# Patient Record
Sex: FEMALE | Race: WHITE | Employment: FULL TIME | URBAN - METROPOLITAN AREA
[De-identification: names, ages, dates, MRNs, and addresses within clinical notes are randomized per-mention and may not be internally consistent; named-entity substitution may affect disease eponyms.]

---

## 2019-01-30 ENCOUNTER — OFFICE VISIT (OUTPATIENT)
Dept: ORTHOPEDIC SURGERY | Age: 54
End: 2019-01-30

## 2019-01-30 VITALS
WEIGHT: 216 LBS | HEART RATE: 70 BPM | SYSTOLIC BLOOD PRESSURE: 146 MMHG | DIASTOLIC BLOOD PRESSURE: 86 MMHG | HEIGHT: 66 IN | OXYGEN SATURATION: 95 % | BODY MASS INDEX: 34.72 KG/M2

## 2019-01-30 DIAGNOSIS — M17.0 PRIMARY OSTEOARTHRITIS OF BOTH KNEES: Primary | ICD-10-CM

## 2019-01-30 RX ORDER — LOSARTAN POTASSIUM 25 MG/1
TABLET ORAL DAILY
COMMUNITY
End: 2019-08-29 | Stop reason: SDUPTHER

## 2019-01-30 RX ORDER — ZOLPIDEM TARTRATE 10 MG/1
TABLET ORAL
COMMUNITY
End: 2019-08-29 | Stop reason: SDUPTHER

## 2019-01-30 RX ORDER — FUROSEMIDE 20 MG/1
TABLET ORAL DAILY
COMMUNITY
End: 2019-08-29 | Stop reason: SDUPTHER

## 2019-01-30 RX ORDER — GABAPENTIN 250 MG/5ML
250 SOLUTION ORAL 3 TIMES DAILY
COMMUNITY
End: 2019-08-29 | Stop reason: CLARIF

## 2019-01-30 RX ORDER — METFORMIN HYDROCHLORIDE 1000 MG/1
1000 TABLET ORAL 2 TIMES DAILY WITH MEALS
COMMUNITY
End: 2019-08-29 | Stop reason: CLARIF

## 2019-01-30 RX ORDER — SIMVASTATIN 20 MG/1
TABLET, FILM COATED ORAL
COMMUNITY
End: 2019-08-29 | Stop reason: CLARIF

## 2019-01-30 RX ORDER — TRIAMCINOLONE ACETONIDE 40 MG/ML
40 INJECTION, SUSPENSION INTRA-ARTICULAR; INTRAMUSCULAR ONCE
Qty: 1 ML | Refills: 0
Start: 2019-01-30 | End: 2019-01-30

## 2019-01-30 NOTE — PROGRESS NOTES
Patient: Yuriy Fish                MRN: [id number]       SSN: [id number]  YOB: 1965        AGE: 48 y.o. SEX: female  Body mass index is 34.86 kg/m². PCP: No primary care provider on file. 19    Chief Complaint: Bilateral knee pain    HISTORY OF PRESENT ILLNESS:   Yuriy Fish is a 48year-old female who comes in the office today with bilateral knee pain. Her pain is worse when working and standing. She has had multiple surgeries to both knees, which are arthroscopies. She has known arthritis in her knees. She is here today to discuss treatment options. Past Medical History:   Diagnosis Date    Cancer [facility])     breast cancer 6 years ago    [facility]     Hypertension        History reviewed. No pertinent family history. Current Outpatient Medications   Medication Sig Dispense Refill    gabapentin (NEURONTIN) 250 mg/5 mL solution Take 250 mg by mouth three (3) times daily.  metFORMIN (GLUCOPHAGE) 1,000 mg tablet Take 1,000 mg by mouth two (2) times daily (with meals).  simvastatin (ZOCOR) 20 mg tablet Take  by mouth nightly.  losartan (COZAAR) 25 mg tablet Take  by mouth daily.  zolpidem (AMBIEN) 10 mg tablet Take  by mouth nightly as needed for Sleep.  furosemide (LASIX) 20 mg tablet Take  by mouth daily.  triamcinolone acetonide (KENALOG) 40 mg/mL injection 1 mL by Intra artICUlar route once for 1 dose.  1 mL 0       Allergies   Allergen Reactions    Aspirin Swelling    Penicillins Swelling       Past Surgical History:   Procedure Laterality Date    HX  SECTION      HX CHOLECYSTECTOMY      HX KNEE ARTHROSCOPY      HX MASTECTOMY      HX TUBAL LIGATION      HX WRIST FRACTURE TX         Social History     Socioeconomic History    Marital status:      Spouse name: Not on file    Number of children: Not on file    Years of education: Not on file    Highest education level: Not on file   Social Needs    Financial resource strain: Not on file    Food insecurity - worry: Not on file    Food insecurity - inability: Not on file    Transportation needs - medical: Not on file   [employer] needs - non-medical: Not on file   Occupational History    Not on file   Tobacco Use    Smoking status: Current Every Day Smoker    Smokeless tobacco: Never Used   Substance and Sexual Activity    Alcohol use: No     Frequency: Never    Drug use: No    Sexual activity: Not on file   Other Topics Concern    Not on file   Social History Narrative    Not on file       REVIEW OF SYSTEMS:      CON: negative for recent weight loss/gain, fever, or chills  EYE: negative for double or blurry vision  ENT: negative for hoarseness  RS:   negative for cough, URI, SOB  CV:  negative for chest pain, palpitations  GI:    negative for blood in stool, nausea/vomiting  :  negative for blood in urine  MS: As per HPI  Other systems reviewed and noted below. PHYSICAL EXAMINATION:  Visit Vitals  /86   Pulse 70   Ht 5' 6\" (1.676 m)   Wt 216 lb (98 kg)   SpO2 95%   BMI 34.86 kg/m²     Body mass index is 34.86 kg/m². GENERAL: Alert and oriented x3, in no acute distress, well-developed, well-nourished. HEENT: Normocephalic, atraumatic. RESP: Non labored breathing with equal chest rise on inspiration. CV: Well perfused extremities. No cyanosis or clubbing noted. ABDOMEN: Soft, non-tender, non-distended. PHYSICAL EXAMINATION:   Physical exam of both knees with crepitus with knee range of motion. No effusion, warmth or erythema. No swelling. Neurovascularly intact distally. IMAGING:   X-rays were taken in the office today. These show bilateral knee arthritis, worse on the right than the left. ASSESSMENT/PLAN:   April Bolaños is a 48year-old female with bilateral knee arthritis. We discussed multiple treatment options today. I would not recommend any surgery on this at this time.   She would like to try cortisone shots. Therefore, after obtaining consent, both knees were injected with Lidocaine and steroid. She tolerated this well. Follow up as needed.         [facility] - Daryl Daniel  OFFICE PROCEDURE PROGRESS NOTE        Chart reviewed for the following:   Nolan Toro MD, have reviewed the History, Physical and updated the Allergic reactions for Jc Smitha performed immediately prior to start of procedure:   Nolan Toro MD, have performed the following reviews on Dee Dee Price prior to the start of the procedure:            * Patient was identified by name and date of birth   * Agreement on procedure being performed was verified  * Risks and Benefits explained to the patient  * Procedure site verified and marked as necessary  * Patient was positioned for comfort  * Consent was signed and verified    Time: 1300      Date of procedure: 1/30/2019    Procedure performed by:  Yessenia Casas MD    Provider assisted by: Hiram Berger LPN    Patient assisted by: self    How tolerated by patient: tolerated the procedure well with no complications    Post Procedural Pain Scale: 0 - No Hurt    Comments: none                  Electronically signed by: Yessenia Casas MD

## 2019-07-30 ENCOUNTER — OFFICE VISIT (OUTPATIENT)
Dept: ORTHOPEDIC SURGERY | Age: 54
End: 2019-07-30

## 2019-07-30 VITALS
HEART RATE: 67 BPM | HEIGHT: 66 IN | RESPIRATION RATE: 13 BRPM | OXYGEN SATURATION: 95 % | DIASTOLIC BLOOD PRESSURE: 105 MMHG | TEMPERATURE: 95.7 F | WEIGHT: 223.2 LBS | BODY MASS INDEX: 35.87 KG/M2 | SYSTOLIC BLOOD PRESSURE: 200 MMHG

## 2019-07-30 DIAGNOSIS — M76.61 TENDONITIS, ACHILLES, RIGHT: Primary | ICD-10-CM

## 2019-07-30 DIAGNOSIS — M25.571 ACUTE RIGHT ANKLE PAIN: ICD-10-CM

## 2019-07-30 DIAGNOSIS — E66.01 SEVERE OBESITY (HCC): ICD-10-CM

## 2019-07-30 RX ORDER — MELOXICAM 15 MG/1
15 TABLET ORAL
Qty: 30 TAB | Refills: 0 | Status: SHIPPED | OUTPATIENT
Start: 2019-07-30 | End: 2020-01-21 | Stop reason: SDUPTHER

## 2019-07-30 RX ORDER — FAMOTIDINE 20 MG/1
40 TABLET, FILM COATED ORAL DAILY
Qty: 30 TAB | Refills: 0 | Status: SHIPPED | OUTPATIENT
Start: 2019-07-30 | End: 2019-08-29 | Stop reason: ALTCHOICE

## 2019-07-30 NOTE — PROGRESS NOTES
AMBULATORY PROGRESS NOTE      Patient: Edinson Lizarraga             MRN: [id number]     SSN: [id number] Body mass index is 36.03 kg/m². YOB: 1965     AGE: 47 y.o. SEX: female    PCP: No primary care provider on file. IMPRESSION/DIAGNOSIS AND TREATMENT PLAN     DIAGNOSES  1. Tendonitis, Achilles, right    2. Acute right ankle pain    3. Severe obesity ([address]        Orders Placed This Encounter    AMB SUPPLY ORDER    [63497] Ankle Min 3V    meloxicam (MOBIC) 15 mg tablet    famotidine (PEPCID) 20 mg tablet      Edinson Lizarraga understands her diagnoses and the proposed plan. Plan:    1) DME Order: Short CAM Boot with   2) DME Order : Heel wedge  3) Mobic 15 m PO every day; 30 tablets, 0 refills. 4) Pepcid 40 m PO every day; 30 tablets, 0 refills. 5) If symptoms do not improve, expect to obtain an MRI to access achilles tendon. RTO - 3 weeks     HPI AND EXAMINATION     Edinson Lizarraga IS A 47 y.o. female who presents to my outpatient office for the first time complaining of: right ankle pain. For the past 4 weeks, patient notes right ankle pain with associated swelling and bruising but denies recent injury. 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 She denies fall, twists, or trauma. She states that she has tried ibuprofen, salt soaks and rest without benefit. Per patient, her ankle pain begins prior to reaching work. She notes associated calf soreness. Patient has history of breast cancer and HTN.       Visit Vitals  BP (!) 200/105   Pulse 67   Temp 95.7 °F (35.4 °C) (Oral)   Resp 13   Ht 5' 6\" (1.676 m)   Wt 223 lb 3.2 oz (101.2 kg)   SpO2 95%   BMI 36.03 kg/m²         Visit Vitals  BP (!) 200/105   Pulse 67   Temp 95.7 °F (35.4 °C) (Oral)   Resp 13   Ht 5' 6\" (1.676 m)   Wt 223 lb 3.2 oz (101.2 kg)   SpO2 95%   BMI 36.03 kg/m²       Appearance: Alert, well appearing and pleasant patient who is in no distress, oriented to person, place/time, and who follows commands. This patient is accompanied in the examination room by her  self. no dementia  Psychiatric: Affect and mood are appropriate. Patient arrives to office via: with assistive device: CANE  SHAHRAM PETERSENNT (2): Head normocephalic & atraumatic. Both pupils are round, non icteric sclera     Eye: EOM are intact and sclera are clear    Neck: ROM WNL       Hearings Intact, does not require hearing aid device  Respiratory: Breathing is unlabored without accessory chest muscle use  Cardiovascular/Peripheral Vascular: Normal Pulses to each  foot      ANKLE/FOOT right    Psychiatry: Alert, Oriented x 3; Speech normal in context and clarity,            Memory intact grossly, no involuntary movements - tremors, no dementia  Gait: normal  Tenderness: moderate tender achilles tendon without deformity palpable:      Swelling:  Significant fusiform swelling at the noninsertional point of her achilies tendon. No warmth. Calf tenderness: Absent for calf or gastrocnemius muscle regions   Soft, supple, non tender, non taut lower extremity compartments   NONE to Medial Malleolar, 4/5 Met base midfoot, achilles, tib post, or   NONE to syndesmosis. no to plantar fascia, or central calcaneal region  Cutaneous: WNL, Joint Motion: WNL   Joint / Tendon Stability: No Ankle or Subtalar instability or joint laxity. No peroneal sublux ability or dislocation  Alignment: neutral Hindfoot, none Metatarsus Adductus Metatarsus. Neuro Motor/Sensory: NL/NL, Vascular: NL foot/ankle pulses  Lymphatics: No extremity lymphedema, No calf swelling, no tenderness to calf muscles. CHART REVIEW     Past Medical History:   Diagnosis Date    Cancer [facility])     breast cancer 6 years ago    Diabetes ([facility]     Hypertension      Current Outpatient Medications   Medication Sig    meloxicam (MOBIC) 15 mg tablet Take 1 Tab by mouth daily (with breakfast).     famotidine (PEPCID) 20 mg tablet Take 2 Tabs by mouth daily for 150 days. Take while on Plavix instead of Prilosec    gabapentin (NEURONTIN) 250 mg/5 mL solution Take 250 mg by mouth three (3) times daily.  metFORMIN (GLUCOPHAGE) 1,000 mg tablet Take 1,000 mg by mouth two (2) times daily (with meals).  simvastatin (ZOCOR) 20 mg tablet Take  by mouth nightly.  losartan (COZAAR) 25 mg tablet Take  by mouth daily.  zolpidem (AMBIEN) 10 mg tablet Take  by mouth nightly as needed for Sleep.  furosemide (LASIX) 20 mg tablet Take  by mouth daily. No current facility-administered medications for this visit. Allergies   Allergen Reactions    Aspirin Swelling    Codeine Swelling    Penicillins Swelling     Past Surgical History:   Procedure Laterality Date    HX  SECTION      HX CHOLECYSTECTOMY      HX KNEE ARTHROSCOPY      HX MASTECTOMY      HX TUBAL LIGATION      HX WRIST FRACTURE TX       Social History     Occupational History    Not on file   Tobacco Use    Smoking status: Current Every Day Smoker    Smokeless tobacco: Never Used   Substance and Sexual Activity    Alcohol use: No     Frequency: Never    Drug use: No    Sexual activity: Not on file     No family history on file. REVIEW OF SYSTEMS : 2019  ALL BELOW ARE Negative except : SEE HPI     General: Negative for fever and chills. No unexpected change in weight. Denies fatigue. No change in appetite. Dermatologic: Negative for rash or itching, dry skin, hair changes, rash or skin lesion changes  HEENT: Negative for congestion, sore throat, neck pain and neck stiffness. No change in vision or hearing. Hasn't noted any enlarged lymph nodes in the neck. Cardiovascular:  Negative for chest pain and palpitations. Has not noted pedal edema.   Peripheral Vascular: No calf pain, vascular vein tenderness to calf pain           No calf throbbing, posterior knee throbbing pain   Respiratory: Negative for cough, colds, sinus, hemoptysis, shortness of breath and wheezing. Gastrointestinal: Negative for nausea and vomiting, rectal bleeding, coffee ground emesis, abdominal pain, diarrhea and constipation. Genitourinary: Negative for dysuria, frequency urgency, or burning on micturition. No flank pain, no foul smelling urine, no difficulty with initiating urination. Hematological: Negative for bleeding or easy bruising. Musculoskeletal: Negative  for arthralgias, back pain or neck pain. Neurological: Negative for dizziness, seizures or syncopal episodes. Denies headaches. Endocrine: Denies excessive thirst.  No heat/cold intolerance. Psychiatric: Negative for depression or insomnia. DIAGNOSTIC IMAGING     ANKLE X RAYS 3 VIEWS Left   X RAYS AT [address] [place] [address]  7/30/2019    FINDINGS:     SOFT TISSUES:              Absent soft tissue swelling, No soft tissue calcifications, No Calcified blood vessels     Soft tissue swelling location:    None to fibula region        None medial aspect    None dorsal midfoot/forefoot  No radiopaque foreign body, abnormal lucency, or focal swelling noted within soft tissues. OSSEOUS:  Medial malleolar ankle mild bone ossicle is seen medial   No fractures, subluxations, dislocations   Bone spur to inferior aspect of calcaneus is  moderate   Bone spur to superior calcaneus region is           large SIZED CALCIFIC INSERTIONAL BONE SPUR IS PRESENT   Mineralization: suggests no Osteopenia or no Osteoporosis    ALIGNMENT:    Ankle mortise alignment is congruent. Tibial plafond and talar dome intact      No Osteochondral defects seen    No Ankle joint effusion seen         I have personally reviewed these images of the above study. The interpretation of this study is my professional opinion. Broadus Primrose, MD  7/30/2019  8:52 AM      Written by María Lion, as dictated by Dr. Kalpana Joya. I, Dr. Kalpana Joya, confirm that all documentation is accurate.

## 2019-07-30 NOTE — PATIENT INSTRUCTIONS
Achilles Tendinitis: Exercises     Complete at least 2 sessions of each exercise each day to get started (no days off). If beneficial and able to fit into your schedule, more sessions are recommended. Nothing should cause an increase in pain or swelling. Towel stretch (calf)    1. Sit with your legs extended and knees straight. 2. Place a towel around your foot just under the toes. 3. Hold each end of the towel in each hand, with your hands above your knees. 4. Pull back with the towel so that your foot stretches toward you. 5. Hold the position for 30 seconds. 6. Repeat 3 times a session. 7. When 3 sets are completed, slightly bend the knee by placing a towel or pillow under it and completing 3 more sets per leg. 8. If stretch becomes too easy and you are no longer able to feel a stretch, discontinue exercise in favor of standing stretches. Wall stretch (calf)    1. Stand facing a wall with your hands on the wall at about eye level. Put your affected leg about a step behind your other leg. 2. Keeping your back leg straight and your back heel on the floor, bend your front knee and gently bring your hip and chest toward the wall until you feel a stretch in the calf of your back leg. 3. Hold the stretch for 30 seconds. 4. Repeat 3 times per leg. 5. Repeat steps 1 through 4, but this time keep your back knee bent. Stair stretch (calf)    1. Stand with the balls of both feet on the edge of a step or curb. With at least one hand, hold onto something solid for balance, such as a banister or handrail. 2. Keeping your knees straight, slowly let the heels hang down off of the step or curb until you feel a stretch in the back of your calf and/or Achilles area. 3. Hold this position for 30 seconds. 4. Repeat 3 times a session. This stretch should be repeated with your knees slightly bent. If too easy, progress to one leg at a time.    5. If stretch is too painful initially,  front of the step and stretch one foot at a time, using the step to push your foot back toward your shin and follow same parameters. Heel raises (eccentric emphasis)    1. Stand on a step with your heel off the edge of the step. Hold on to a handrail or wall for balance. 2. Push up on your toes, then slowly count to 5 as you lower yourself back down until your heel is below the step. If it hurts to push up on your toes, try putting most of your weight on your other foot as you push up, or try using your arms to help you. If you can't do this exercise without causing pain, stop the exercise. 3. Repeat 10 times for 3 sets. If you have any questions, please call [facility] and ask for Cielo Morales, Certified Athletic Trainer.

## 2019-07-30 NOTE — PROGRESS NOTES
1. Have you been to the ER, urgent care clinic since your last visit? Hospitalized since your last visit? No    2. Have you seen or consulted any other health care providers outside of the [address] [place] [address] since your last visit? Include any pap smears or colon screening.  No

## 2019-07-30 NOTE — PROGRESS NOTES
I demonstrated and instructed patient on Achilles stretching exercises. Patient verbally understands and consents to treatment plan.

## 2019-08-20 ENCOUNTER — OFFICE VISIT (OUTPATIENT)
Dept: ORTHOPEDIC SURGERY | Age: 54
End: 2019-08-20

## 2019-08-20 VITALS
DIASTOLIC BLOOD PRESSURE: 99 MMHG | HEART RATE: 70 BPM | HEIGHT: 66 IN | OXYGEN SATURATION: 95 % | WEIGHT: 223 LBS | SYSTOLIC BLOOD PRESSURE: 186 MMHG | TEMPERATURE: 96 F | BODY MASS INDEX: 35.84 KG/M2

## 2019-08-20 DIAGNOSIS — M76.62 ACHILLES TENDINITIS OF LEFT LOWER EXTREMITY: Primary | ICD-10-CM

## 2019-08-20 RX ORDER — METHYLPREDNISOLONE 4 MG/1
4 TABLET ORAL
Qty: 21 TAB | Refills: 0 | Status: SHIPPED | OUTPATIENT
Start: 2019-08-20 | End: 2019-08-29 | Stop reason: ALTCHOICE

## 2019-08-20 RX ORDER — FAMOTIDINE 40 MG/1
40 TABLET, FILM COATED ORAL DAILY
Qty: 30 TAB | Refills: 0 | Status: SHIPPED | OUTPATIENT
Start: 2019-08-20 | End: 2019-08-29 | Stop reason: ALTCHOICE

## 2019-08-20 NOTE — PROGRESS NOTES
AMBULATORY PROGRESS NOTE      Patient: Kishor Sparrow             MRN: [id number]     SSN: [id number] Body mass index is 35.99 kg/m². YOB: 1965     AGE: 47 y.o. SEX: female    PCP: No primary care provider on file. IMPRESSION/DIAGNOSIS AND TREATMENT PLAN     DIAGNOSES  1. Achilles tendinitis of left lower extremity        Orders Placed This Encounter    methylPREDNISolone (MEDROL DOSEPACK) 4 mg tablet    famotidine (PEPCID) 40 mg tablet      Kishor Sparrow understands her diagnoses and the proposed plan. Plan:    1) Medrol DosePak. 2) Pepcid 40 m PO every day; 30 tablets, 0 refills. 3) Continue performing Achilles tendon stretches as directed. 4) Continue activity modification as directed. 5) Anticipate MRI if condition does not improve. RTO - 3 weeks     HPI AND EXAMINATION     Kishor Sparrow IS A 47 y.o. female who presents to my outpatient office for follow up of Achilles tendinitis of the LLE. At the last visit, I provided an order for a short left CAM boot and Visco heels, prescribed Mobic 15 mg and Pepcid 40 mg, instructed the patient to continue activity modification as directed, and to anticipate an MRI if condition does not improve. Since we saw her last, Ms. Moshe Cage states that she is still experiencing considerable pain in her left Achilles tendon. She reports that she starts experiencing pain just a few hours into her work day, even when wearing the CAM walker boot. The patient is currently taking gabapentin. She recalls that her pain has been ongoing for 4 weeks and that she has been using the CAM boot for 3 weeks. The patient has h/o breast cancer and HTN. Visit Vitals  BP (!) 186/99   Pulse 70   Temp 96 °F (35.6 °C) (Oral)   Ht 5' 6\" (1.676 m)   Wt 223 lb (101.2 kg)   SpO2 95%   BMI 35.99 kg/m²     Appearance: Alert, well appearing and pleasant patient who is in no distress, oriented to person, place/time, and who follows commands. This patient is accompanied in the examination room by her  self. no dementia  Psychiatric: Affect and mood are appropriate. Patient arrives to office via: with assistive device: CAM walker boot  H EENT (2): Head normocephalic & atraumatic. Both pupils are round, non icteric sclera     Eye: EOM are intact and sclera are clear    Neck: ROM WNL       Hearings Intact, does not require hearing aid device  Respiratory: Breathing is unlabored without accessory chest muscle use  Cardiovascular/Peripheral Vascular: Normal Pulses to each  foot      ANKLE/FOOT left    Gait: in CAM walker boot  Tenderness: moderate tender achilles tendon without deformity palpable:  Swelling: Moderate fusiform swelling at the noninsertional point of her achilies tendon. No warmth. Calf tenderness: Absent for calf or gastrocnemius muscle regions   Soft, supple, non tender, non taut lower extremity compartments   NONE to Medial Malleolar, 4/5 Met base midfoot, achilles, tib post, or   NONE to syndesmosis. no to plantar fascia, or central calcaneal region  Cutaneous: WNL  Joint Motion: WNL   Joint / Tendon Stability: No Ankle or Subtalar instability or joint laxity. No peroneal sublux ability or dislocation  Alignment: neutral Hindfoot, none Metatarsus Adductus Metatarsus. Neuro Motor/Sensory: NL/NL  Vascular: NL foot/ankle pulses  Lymphatics: No extremity lymphedema, No calf swelling, no tenderness to calf muscles. CHART REVIEW     Past Medical History:   Diagnosis Date    Cancer [facility])     breast cancer 6 years ago    Diabetes ([facility]     Hypertension      Current Outpatient Medications   Medication Sig    methylPREDNISolone (MEDROL DOSEPACK) 4 mg tablet Take 1 Tab by mouth Specific Days and Specific Times.  famotidine (PEPCID) 40 mg tablet Take 1 Tab by mouth daily.  meloxicam (MOBIC) 15 mg tablet Take 1 Tab by mouth daily (with breakfast).     famotidine (PEPCID) 20 mg tablet Take 2 Tabs by mouth daily for 150 days. Take while on Plavix instead of Prilosec    gabapentin (NEURONTIN) 250 mg/5 mL solution Take 250 mg by mouth three (3) times daily.  metFORMIN (GLUCOPHAGE) 1,000 mg tablet Take 1,000 mg by mouth two (2) times daily (with meals).  simvastatin (ZOCOR) 20 mg tablet Take  by mouth nightly.  losartan (COZAAR) 25 mg tablet Take  by mouth daily.  zolpidem (AMBIEN) 10 mg tablet Take  by mouth nightly as needed for Sleep.  furosemide (LASIX) 20 mg tablet Take  by mouth daily. No current facility-administered medications for this visit. Allergies   Allergen Reactions    Aspirin Swelling    Codeine Swelling    Penicillins Swelling     Past Surgical History:   Procedure Laterality Date    HX  SECTION      HX CHOLECYSTECTOMY      HX KNEE ARTHROSCOPY      HX MASTECTOMY      HX TUBAL LIGATION      HX WRIST FRACTURE TX       Social History     Occupational History    Not on file   Tobacco Use    Smoking status: Current Every Day Smoker    Smokeless tobacco: Never Used   Substance and Sexual Activity    Alcohol use: No     Frequency: Never    Drug use: No    Sexual activity: Not on file     History reviewed. No pertinent family history. REVIEW OF SYSTEMS : 2019  ALL BELOW ARE Negative except : SEE HPI     General: Negative for fever and chills. No unexpected change in weight. Denies fatigue. No change in appetite. Dermatologic: Negative for rash or itching, dry skin, hair changes, rash or skin lesion changes  HEENT: Negative for congestion, sore throat, neck pain and neck stiffness. No change in vision or hearing. Hasn't noted any enlarged lymph nodes in the neck. Cardiovascular:  Negative for chest pain and palpitations. Has not noted pedal edema.   Peripheral Vascular: No calf pain, vascular vein tenderness to calf pain           No calf throbbing, posterior knee throbbing pain   Respiratory: Negative for cough, colds, sinus, hemoptysis, shortness of breath and wheezing. Gastrointestinal: Negative for nausea and vomiting, rectal bleeding, coffee ground emesis, abdominal pain, diarrhea and constipation. Genitourinary: Negative for dysuria, frequency urgency, or burning on micturition. No flank pain, no foul smelling urine, no difficulty with initiating urination. Hematological: Negative for bleeding or easy bruising. Musculoskeletal: Negative  for arthralgias, back pain or neck pain. Neurological: Negative for dizziness, seizures or syncopal episodes. Denies headaches. Endocrine: Denies excessive thirst.  No heat/cold intolerance. Psychiatric: Negative for depression or insomnia. DIAGNOSTIC IMAGING     No notes on file    Please see above section of this report. I have personally reviewed the results of the above study. The interpretation of this study is my professional opinion. Written by Jonathan Marquez, as dictated by Dr. Bharat Juan. I, Dr. Bharat Juan, confirm that all documentation is accurate.

## 2019-08-20 NOTE — PATIENT INSTRUCTIONS
Tendon Injury (Tendinopathy): Care Instructions  Your Care Instructions    Tendons are tough, flexible tissues that connect muscle to bone. A tendon can hurt or get torn from overuse or aging, especially tendons in the shoulder, elbow, wrist, hip, knee, or ankle. Tendon injuries may be called tendinopathy or tendinitis. Tendon injuries can occur from any motion you have to repeat in a job, sports, or daily activities. Tennis elbow is one common tendon injury. You can treat most tendon problems with over-the-counter pain medicine, rest, changes in your activities, and physical therapy. Follow-up care is a key part of your treatment and safety. Be sure to make and go to all appointments, and call your doctor if you are having problems. It's also a good idea to know your test results and keep a list of the medicines you take. How can you care for yourself at home? · Rest the sore area. You may have to stop doing the activity that caused the tendon pain for a while. · Take an over-the-counter pain medicine, such as acetaminophen (Tylenol), ibuprofen (Advil, Motrin), or naproxen (Aleve). Read and follow all instructions on the label. · Do not take two or more pain medicines at the same time unless the doctor told you to. Many pain medicines have acetaminophen, which is Tylenol. Too much acetaminophen (Tylenol) can be harmful. · Put ice or a cold pack on the sore area for 10 to 20 minutes at a time. Try to do this every 1 to 2 hours for the next 3 days (when you are awake) or until any swelling goes down. Put a thin cloth between the ice and your skin. · Prop up the sore area on a pillow when you ice it or anytime you sit or lie down during the next 3 days. Try to keep it above the level of your heart. This will help reduce swelling.   · Follow your doctor's advice for wearing and caring for a sling, splint, or cast. In some cases, you may wear one of these for a while to help your tendon heal.  · Follow your doctor's advice for stretching and physical therapy. Gently move your joint through its full range of motion. This will prevent stiffness in your joint. · Go back to your activity slowly. Warm up before and stretch after the activity. You also can try making some changes. For example, if a sport caused your tendon pain, alternate the sport with another activity. If using a tool causes pain, switch hands or change your . Stop the activity if it hurts. After the activity, apply ice to prevent pain and swelling. · Do not smoke. Smoking can slow healing. If you need help quitting, talk to your doctor about stop-smoking programs and medicines. These can increase your chances of quitting for good. When should you call for help? Watch closely for changes in your health, and be sure to contact your doctor if:    · Your pain gets worse.     · You do not get better as expected. Where can you learn more? Go to http://aminta-mely.info/. Enter A157 in the search box to learn more about \"Tendon Injury (Tendinopathy): Care Instructions. \"  Current as of: September 20, 2018  Content Version: 12.1  © 9977-8463 [employer]. Care instructions adapted under license by [employer] (which disclaims liability or warranty for this information). If you have questions about a medical condition or this instruction, always ask your healthcare professional. [address] any warranty or liability for your use of this information.

## 2019-08-29 ENCOUNTER — OFFICE VISIT (OUTPATIENT)
Dept: FAMILY MEDICINE CLINIC | Age: 54
End: 2019-08-29

## 2019-08-29 VITALS
WEIGHT: 213 LBS | TEMPERATURE: 96.4 F | RESPIRATION RATE: 20 BRPM | BODY MASS INDEX: 34.23 KG/M2 | HEIGHT: 66 IN | DIASTOLIC BLOOD PRESSURE: 83 MMHG | SYSTOLIC BLOOD PRESSURE: 134 MMHG | OXYGEN SATURATION: 95 % | HEART RATE: 74 BPM

## 2019-08-29 DIAGNOSIS — Z13.29 SCREENING FOR ENDOCRINE, METABOLIC AND IMMUNITY DISORDER: ICD-10-CM

## 2019-08-29 DIAGNOSIS — E66.01 SEVERE OBESITY (HCC): ICD-10-CM

## 2019-08-29 DIAGNOSIS — R23.2 HOT FLASHES: ICD-10-CM

## 2019-08-29 DIAGNOSIS — Z12.11 SCREENING FOR COLORECTAL CANCER: ICD-10-CM

## 2019-08-29 DIAGNOSIS — E11.9 TYPE 2 DIABETES MELLITUS WITHOUT COMPLICATION, WITHOUT LONG-TERM CURRENT USE OF INSULIN (HCC): ICD-10-CM

## 2019-08-29 DIAGNOSIS — Z13.228 SCREENING FOR ENDOCRINE, METABOLIC AND IMMUNITY DISORDER: ICD-10-CM

## 2019-08-29 DIAGNOSIS — Z13.0 SCREENING FOR ENDOCRINE, METABOLIC AND IMMUNITY DISORDER: ICD-10-CM

## 2019-08-29 DIAGNOSIS — E78.00 HYPERCHOLESTEROLEMIA: ICD-10-CM

## 2019-08-29 DIAGNOSIS — Z12.12 SCREENING FOR COLORECTAL CANCER: ICD-10-CM

## 2019-08-29 DIAGNOSIS — Z85.3 HISTORY OF RIGHT BREAST CANCER: ICD-10-CM

## 2019-08-29 DIAGNOSIS — I10 ESSENTIAL HYPERTENSION: Primary | ICD-10-CM

## 2019-08-29 DIAGNOSIS — Z11.59 ENCOUNTER FOR HEPATITIS C SCREENING TEST FOR LOW RISK PATIENT: ICD-10-CM

## 2019-08-29 DIAGNOSIS — G47.00 INSOMNIA, UNSPECIFIED TYPE: ICD-10-CM

## 2019-08-29 RX ORDER — METFORMIN HYDROCHLORIDE 500 MG/1
500 TABLET ORAL 2 TIMES DAILY
Refills: 5 | COMMUNITY
Start: 2019-08-07 | End: 2019-08-29 | Stop reason: SDUPTHER

## 2019-08-29 RX ORDER — SIMVASTATIN 40 MG/1
40 TABLET, FILM COATED ORAL
COMMUNITY
Start: 2019-08-19 | End: 2019-08-29 | Stop reason: SDUPTHER

## 2019-08-29 RX ORDER — SIMVASTATIN 40 MG/1
40 TABLET, FILM COATED ORAL
Qty: 90 TAB | Refills: 1 | Status: SHIPPED | OUTPATIENT
Start: 2019-08-29 | End: 2020-01-21 | Stop reason: SDUPTHER

## 2019-08-29 RX ORDER — FUROSEMIDE 20 MG/1
20 TABLET ORAL
Qty: 90 TAB | Refills: 1 | Status: SHIPPED | OUTPATIENT
Start: 2019-08-29 | End: 2020-01-21 | Stop reason: SDUPTHER

## 2019-08-29 RX ORDER — METFORMIN HYDROCHLORIDE 500 MG/1
500 TABLET ORAL 2 TIMES DAILY
Qty: 180 TAB | Refills: 1 | Status: SHIPPED | OUTPATIENT
Start: 2019-08-29 | End: 2020-01-21 | Stop reason: SDUPTHER

## 2019-08-29 RX ORDER — GABAPENTIN 100 MG/1
100 CAPSULE ORAL 3 TIMES DAILY
Qty: 270 CAP | Refills: 1 | Status: SHIPPED | OUTPATIENT
Start: 2019-08-29 | End: 2020-01-21 | Stop reason: SDUPTHER

## 2019-08-29 RX ORDER — ZOLPIDEM TARTRATE 10 MG/1
10 TABLET ORAL
Qty: 90 TAB | Refills: 1 | Status: SHIPPED | OUTPATIENT
Start: 2019-08-29 | End: 2019-12-23 | Stop reason: SDUPTHER

## 2019-08-29 RX ORDER — GABAPENTIN 100 MG/1
CAPSULE ORAL 3 TIMES DAILY
COMMUNITY
Start: 2019-08-02 | End: 2019-08-29 | Stop reason: SDUPTHER

## 2019-08-29 RX ORDER — VENLAFAXINE 37.5 MG/1
37.5 TABLET ORAL DAILY
Refills: 5 | COMMUNITY
Start: 2019-08-07 | End: 2019-08-29 | Stop reason: SDUPTHER

## 2019-08-29 RX ORDER — VENLAFAXINE 37.5 MG/1
37.5 TABLET ORAL DAILY
Qty: 90 TAB | Refills: 1 | Status: SHIPPED | OUTPATIENT
Start: 2019-08-29 | End: 2020-01-21 | Stop reason: SDUPTHER

## 2019-08-29 RX ORDER — LOSARTAN POTASSIUM 25 MG/1
25 TABLET ORAL DAILY
Qty: 90 TAB | Refills: 1 | Status: SHIPPED | OUTPATIENT
Start: 2019-08-29 | End: 2020-01-21 | Stop reason: DRUGHIGH

## 2019-08-29 NOTE — PROGRESS NOTES
OFFICE NOTE    Aamir Medrano is a 47 y.o. female presenting today for office visit. 8/29/2019  4:03 PM    Chief Complaint   Patient presents with    Establish Care       HPI: Here today as a new patient, establishing care. Used to see a provider in [address] for PCP. Unsure of last routine labs. Follows with foot/ankle orthopedics. Used to see oncology for history of right breast cancer with [facility]. HTN/Hyperlipidemia: Taking Losartan, Lasix PRN, and Zocor for management. Unsure of last LDL. Does not check BP at home. No cardiac imaging on file. Diabetes: Diagnosed a few years ago and has been taking Metformin- reports that she always has been told that she is \"borderline. \" Does not check glucoses at home. She is not sure of last A1c. Insomnia: Taking Ambien 10 mg nightly- has been on for years. Reports that 5 mg never worked for her so she has been taking 10 mg. No daytime sleepiness. History of breast cancer: She reports diagnosed in 2013 and had right mastectomy with some lymph node removals. She has some right arm swelling that she uses PRN Lasix. She is on Mobic and Gabapentin for management of chronic pain related to surgery. She had been on Femara in the past. Has not seen oncology in a few years. She also has hot flashes- on Effexor. Review of Systems   Constitutional: Negative for appetite change, chills, fatigue, fever and unexpected weight change. Respiratory: Negative for cough, shortness of breath and wheezing. Cardiovascular: Negative for chest pain, palpitations and leg swelling. Gastrointestinal: Negative for abdominal pain, constipation, diarrhea, nausea and vomiting. Genitourinary: Negative for difficulty urinating and frequency. Musculoskeletal: Negative for arthralgias and myalgias. Skin: Negative for rash. Neurological: Negative for dizziness and headaches.    Psychiatric/Behavioral: Negative for decreased concentration, dysphoric mood, hallucinations, self-injury, sleep disturbance and suicidal ideas. The patient is not nervous/anxious.           PHQ Screening   3 most recent PHQ Screens 2019   Little interest or pleasure in doing things Not at all   Feeling down, depressed, irritable, or hopeless Not at all   Total Score PHQ 2 0         History  Past Medical History:   Diagnosis Date    Breast cancer ([facility] [address] 2013    right breast    Diabetes ([facility]     Hypercholesterolemia     Hypertension     Insomnia        Past Surgical History:   Procedure Laterality Date    HX CARPAL TUNNEL RELEASE Bilateral     HX  SECTION      HX CHOLECYSTECTOMY      HX KNEE ARTHROSCOPY Bilateral     HX MASTECTOMY Right 2013    HX TUBAL LIGATION         Social History     Socioeconomic History    Marital status:      Spouse name: Not on file    Number of children: Not on file    Years of education: Not on file    Highest education level: Not on file   Occupational History    Not on file   Social Needs    Financial resource strain: Not on file    Food insecurity:     Worry: Not on file     Inability: Not on file    Transportation needs:     Medical: Not on file     Non-medical: Not on file   Tobacco Use    Smoking status: Current Every Day Smoker    Smokeless tobacco: Never Used   Substance and Sexual Activity    Alcohol use: Yes     Frequency: Never     Comment: occassionl    Drug use: No    Sexual activity: Not Currently     Partners: Male   Lifestyle    Physical activity:     Days per week: Not on file     Minutes per session: Not on file    Stress: Not on file   Relationships    Social connections:     Talks on phone: Not on file     Gets together: Not on file     Attends Gnosticism service: Not on file     Active member of club or organization: Not on file     Attends meetings of clubs or organizations: Not on file     Relationship status: Not on file    Intimate partner violence:     Fear of current or ex partner: Not on file     Emotionally abused: Not on file     Physically abused: Not on file     Forced sexual activity: Not on file   Other Topics Concern    Not on file   Social History Narrative    Not on file       Family History   Problem Relation Age of Onset    Cancer Mother     COPD Mother     Alcohol abuse Father     Diabetes Father     Hypertension Father     Heart Disease Father     Elevated Lipids Father     Hypertension Brother     Hypertension Maternal Grandmother     Hypertension Maternal Grandfather        Allergies   Allergen Reactions    Aspirin Swelling    Codeine Swelling    Penicillins Swelling       Current Outpatient Medications   Medication Sig Dispense Refill    meloxicam (MOBIC) 15 mg tablet Take 1 Tab by mouth daily (with breakfast). 30 Tab 0    losartan (COZAAR) 25 mg tablet Take  by mouth daily.  zolpidem (AMBIEN) 10 mg tablet Take  by mouth nightly as needed for Sleep.  furosemide (LASIX) 20 mg tablet Take  by mouth daily.  venlafaxine (EFFEXOR) 37.5 mg tablet Take 37.5 mg by mouth daily. 5    gabapentin (NEURONTIN) 100 mg capsule three (3) times daily.  metFORMIN (GLUCOPHAGE) 500 mg tablet 500 mg two (2) times a day. 5    simvastatin (ZOCOR) 40 mg tablet 40 mg. Advance Care Planning:   Patient was offered the opportunity to discuss advance care planning NO   Does patient have an Advance Directive: If no, did you provide information on Caring Connections? Patient Care Team:  Patient Care Team:  Danyelle Corona NP as PCP - General (Nurse Practitioner)        LABS:  None new to review    RADIOLOGY:  None new to review      Physical Exam   Constitutional: She is oriented to person, place, and time. She appears well-developed and well-nourished. No distress. Neck: Normal range of motion. Neck supple. No thyromegaly present. Cardiovascular: Normal rate, regular rhythm and normal heart sounds. No murmur heard.   Pulmonary/Chest: Effort normal and breath sounds normal. No respiratory distress. Abdominal: Soft. Bowel sounds are normal. There is no tenderness. Musculoskeletal: She exhibits no edema. +left foot in orthopedic boot   Neurological: She is alert and oriented to person, place, and time. She exhibits normal muscle tone. Coordination and gait normal.   Skin: Skin is warm and dry. Psychiatric: Her speech is normal and behavior is normal. Judgment normal. Her mood appears not anxious. She is not hyperactive, not withdrawn and not actively hallucinating. Cognition and memory are normal. She does not express impulsivity. She does not exhibit a depressed mood. She expresses no homicidal and no suicidal ideation. Vitals:    08/29/19 1537   BP: 134/83   Pulse: 74   Resp: 20   Temp: 96.4 °F (35.8 °C)   TempSrc: Oral   SpO2: 95%   Weight: 213 lb (96.6 kg)   Height: 5' 6\" (1.676 m)   PainSc:   0 - No pain         Assessment and Plan    Essential hypertension  *Refilled, controlled. Check labs routinely in the next few weeks  - losartan (COZAAR) 25 mg tablet; Take 1 Tab by mouth daily. Dispense: 90 Tab; Refill: 1  - METABOLIC PANEL, COMPREHENSIVE; Future    Hypercholesterolemia  *Refilled, check lipids soon  - simvastatin (ZOCOR) 40 mg tablet; Take 1 Tab by mouth nightly. Dispense: 90 Tab; Refill: 1  - LIPID PANEL; Future    Severe obesity ([address]  *I have reviewed/discussed the above normal BMI with the patient. I have recommended the following interventions: dietary management education, guidance, and counseling, encourage exercise and monitor weight . Type 2 diabetes mellitus without complication, without long-term current use of insulin (HCC)  *Check routine labs soon. Refilled on Metformin  - metFORMIN (GLUCOPHAGE) 500 mg tablet; Take 1 Tab by mouth two (2) times a day. Dispense: 180 Tab; Refill: 1  - HEMOGLOBIN A1C WITH EAG;  Future  - MICROALBUMIN, UR, RAND W/ MICROALB/CREAT RATIO; Future    Insomnia, unspecified type  *Advised that not recommended to be on 10 mg; however, she has been doing well. Will continue at this time  - zolpidem (AMBIEN) 10 mg tablet; Take 1 Tab by mouth nightly as needed for Sleep. Max Daily Amount: 10 mg. Dispense: 90 Tab; Refill: 1    History of right breast cancer  *Refilled. Forward to oncology for further evaluation and management  - furosemide (LASIX) 20 mg tablet; Take 1 Tab by mouth daily as needed for Other (severe swelling). Dispense: 90 Tab; Refill: 1  - gabapentin (NEURONTIN) 100 mg capsule; Take 1 Cap by mouth three (3) times daily. Max Daily Amount: 300 mg. Dispense: 270 Cap; Refill: 1  - REFERRAL TO ONCOLOGY    Hot flashes  *Refilled, stable  - venlafaxine (EFFEXOR) 37.5 mg tablet; Take 1 Tab by mouth daily. Dispense: 90 Tab; Refill: 1    Screening for endocrine, metabolic and immunity disorder  - CBC W/O DIFF; Future  - URINALYSIS W/MICROSCOPIC; Future  - TSH 3RD GENERATION; Future    Encounter for hepatitis C screening test for low risk patient  - HEPATITIS C AB; Future    Screening for colorectal cancer  - REFERRAL TO COLON AND RECTAL SURGERY      *Plan of care reviewed with patient. Patient in agreement with plan and expresses understanding. All questions answered and patient encouraged to call or RTO if further questions or concerns. Follow-up and Dispositions    · Return in about 5 months (around 1/29/2020) for chronic disease routine care- 30 min.

## 2019-09-03 ENCOUNTER — LAB ONLY (OUTPATIENT)
Dept: FAMILY MEDICINE CLINIC | Age: 54
End: 2019-09-03

## 2019-09-03 ENCOUNTER — HOSPITAL ENCOUNTER (OUTPATIENT)
Dept: LAB | Age: 54
Discharge: HOME | End: 2019-09-03
Payer: COMMERCIAL

## 2019-09-03 DIAGNOSIS — I10 ESSENTIAL HYPERTENSION: ICD-10-CM

## 2019-09-03 DIAGNOSIS — Z11.59 ENCOUNTER FOR HEPATITIS C SCREENING TEST FOR LOW RISK PATIENT: ICD-10-CM

## 2019-09-03 DIAGNOSIS — Z13.0 SCREENING FOR ENDOCRINE, METABOLIC AND IMMUNITY DISORDER: ICD-10-CM

## 2019-09-03 DIAGNOSIS — Z01.89 ENCOUNTER FOR LABORATORY TEST: Primary | ICD-10-CM

## 2019-09-03 DIAGNOSIS — E78.00 HYPERCHOLESTEROLEMIA: ICD-10-CM

## 2019-09-03 DIAGNOSIS — Z13.29 SCREENING FOR ENDOCRINE, METABOLIC AND IMMUNITY DISORDER: ICD-10-CM

## 2019-09-03 DIAGNOSIS — Z13.228 SCREENING FOR ENDOCRINE, METABOLIC AND IMMUNITY DISORDER: ICD-10-CM

## 2019-09-03 DIAGNOSIS — E11.9 TYPE 2 DIABETES MELLITUS WITHOUT COMPLICATION, WITHOUT LONG-TERM CURRENT USE OF INSULIN (HCC): ICD-10-CM

## 2019-09-03 LAB
ALBUMIN SERPL-MCNC: 3.8 G/DL (ref 3.4–5)
ALBUMIN/GLOB SERPL: 1.5 {RATIO} (ref 0.8–1.7)
ALP SERPL-CCNC: 76 U/L (ref 45–117)
ALT SERPL-CCNC: 52 U/L (ref 13–56)
ANION GAP SERPL CALC-SCNC: 4 MMOL/L (ref 3–18)
APPEARANCE UR: ABNORMAL
AST SERPL-CCNC: 25 U/L (ref 10–38)
BACTERIA URNS QL MICRO: ABNORMAL /HPF
BILIRUB SERPL-MCNC: 0.6 MG/DL (ref 0.2–1)
BILIRUB UR QL: NEGATIVE
BUN SERPL-MCNC: 11 MG/DL (ref 7–18)
BUN/CREAT SERPL: 15 (ref 12–20)
CALCIUM SERPL-MCNC: 9.4 MG/DL (ref 8.5–10.1)
CHLORIDE SERPL-SCNC: 103 MMOL/L (ref 100–111)
CO2 SERPL-SCNC: 31 MMOL/L (ref 21–32)
COLOR UR: YELLOW
CREAT SERPL-MCNC: 0.75 MG/DL (ref 0.6–1.3)
EPITH CASTS URNS QL MICRO: ABNORMAL /LPF (ref 0–5)
ERYTHROCYTE [DISTWIDTH] IN BLOOD BY AUTOMATED COUNT: 14.4 % (ref 11.6–14.5)
EST. AVERAGE GLUCOSE BLD GHB EST-MCNC: 163 MG/DL
GLOBULIN SER CALC-MCNC: 2.5 G/DL (ref 2–4)
GLUCOSE SERPL-MCNC: 159 MG/DL (ref 74–99)
GLUCOSE UR STRIP.AUTO-MCNC: NEGATIVE MG/DL
HBA1C MFR BLD: 7.3 % (ref 4.2–5.6)
HCT VFR BLD AUTO: 47.6 % (ref 35–45)
HGB BLD-MCNC: 15.5 G/DL (ref 12–16)
HGB UR QL STRIP: NEGATIVE
KETONES UR QL STRIP.AUTO: NEGATIVE MG/DL
LEUKOCYTE ESTERASE UR QL STRIP.AUTO: NEGATIVE
MCH RBC QN AUTO: 31.3 PG (ref 24–34)
MCHC RBC AUTO-ENTMCNC: 32.6 G/DL (ref 31–37)
MCV RBC AUTO: 96 FL (ref 74–97)
NITRITE UR QL STRIP.AUTO: NEGATIVE
PH UR STRIP: 5 [PH] (ref 5–8)
PLATELET # BLD AUTO: 204 K/UL (ref 135–420)
PMV BLD AUTO: 10.4 FL (ref 9.2–11.8)
POTASSIUM SERPL-SCNC: 4.2 MMOL/L (ref 3.5–5.5)
PROT SERPL-MCNC: 6.3 G/DL (ref 6.4–8.2)
PROT UR STRIP-MCNC: 30 MG/DL
RBC # BLD AUTO: 4.96 M/UL (ref 4.2–5.3)
RBC #/AREA URNS HPF: 0 /HPF (ref 0–5)
SODIUM SERPL-SCNC: 138 MMOL/L (ref 136–145)
SP GR UR REFRACTOMETRY: 1.02 (ref 1–1.03)
TSH SERPL DL<=0.05 MIU/L-ACNC: 1.57 UIU/ML (ref 0.36–3.74)
UROBILINOGEN UR QL STRIP.AUTO: 0.2 EU/DL (ref 0.2–1)
WBC # BLD AUTO: 8.4 K/UL (ref 4.6–13.2)
WBC URNS QL MICRO: ABNORMAL /HPF (ref 0–4)
YEAST URNS QL MICRO: ABNORMAL

## 2019-09-03 PROCEDURE — 81001 URINALYSIS AUTO W/SCOPE: CPT

## 2019-09-03 PROCEDURE — 86803 HEPATITIS C AB TEST: CPT

## 2019-09-03 PROCEDURE — 82043 UR ALBUMIN QUANTITATIVE: CPT

## 2019-09-03 PROCEDURE — 80053 COMPREHEN METABOLIC PANEL: CPT

## 2019-09-03 PROCEDURE — 80061 LIPID PANEL: CPT

## 2019-09-03 PROCEDURE — 85027 COMPLETE CBC AUTOMATED: CPT

## 2019-09-03 PROCEDURE — 84443 ASSAY THYROID STIM HORMONE: CPT

## 2019-09-03 PROCEDURE — 83036 HEMOGLOBIN GLYCOSYLATED A1C: CPT

## 2019-09-03 NOTE — PROGRESS NOTES
Venipuncture to left forearm at this time. Patient tolerated well at this time. Labs ordered by PCP. No concerns noted.

## 2019-09-04 LAB
CHOLEST SERPL-MCNC: 176 MG/DL
CREAT UR-MCNC: 175 MG/DL (ref 30–125)
HCV AB SER IA-ACNC: 0.03 INDEX
HCV AB SERPL QL IA: NEGATIVE
HCV COMMENT,HCGAC: NORMAL
HDLC SERPL-MCNC: 41 MG/DL (ref 40–60)
HDLC SERPL: 4.3 {RATIO} (ref 0–5)
LDLC SERPL CALC-MCNC: 84.8 MG/DL (ref 0–100)
LIPID PROFILE,FLP: ABNORMAL
MICROALBUMIN UR-MCNC: 21.5 MG/DL (ref 0–3)
MICROALBUMIN/CREAT UR-RTO: 123 MG/G (ref 0–30)
TRIGL SERPL-MCNC: 251 MG/DL (ref ?–150)
VLDLC SERPL CALC-MCNC: 50.2 MG/DL

## 2019-09-10 ENCOUNTER — OFFICE VISIT (OUTPATIENT)
Dept: ORTHOPEDIC SURGERY | Age: 54
End: 2019-09-10

## 2019-09-10 VITALS
OXYGEN SATURATION: 93 % | BODY MASS INDEX: 34.23 KG/M2 | SYSTOLIC BLOOD PRESSURE: 176 MMHG | DIASTOLIC BLOOD PRESSURE: 96 MMHG | TEMPERATURE: 96.2 F | HEART RATE: 68 BPM | WEIGHT: 213 LBS | HEIGHT: 66 IN

## 2019-09-10 DIAGNOSIS — M76.62 ACHILLES TENDINITIS OF LEFT LOWER EXTREMITY: Primary | ICD-10-CM

## 2019-09-10 NOTE — PATIENT INSTRUCTIONS
An MRI or CT has been ordered for you. [facility] [employer] will be contacting you to schedule the appointment as soon as it has been approved with your insurance company. Please schedule an appointment to follow up with the doctor or the physicians assistant after the MRI or CT has been conducted.

## 2019-09-10 NOTE — PROGRESS NOTES
AMBULATORY PROGRESS NOTE      Patient: Angela Berger             MRN: [id number]     SSN: [id number] Body mass index is 34.38 kg/m². YOB: 1965     AGE: 47 y.o. SEX: female    PCP: Zeke Zabala NP     IMPRESSION/DIAGNOSIS AND TREATMENT PLAN     DIAGNOSES  1. Achilles tendinitis of left lower extremity        Orders Placed This Encounter    MRI ANKLE LT WO CONT      Angela Berger understands her diagnoses and the proposed plan. She has failed conservative treatment and has severe pain to the left, noninsertional portion of the Achilles tendon and at the insertion point of the left Achilles tendon. Recommendations are listed as below. She is going to require surgery, in my opinion, excisional debridement of the left Achilles tendon, possible FHL tendon augmentation, but I want to see the MRI first as well. Plan:    1) MRI without IV Contrast of the left ankle; please assess the Achilles tendon. 2) Continue wearing the CAM boot as directed. 3) Continue activity modification as directed. RTO - after MRI     HPI AND EXAMINATION     Angela Berger IS A 47 y.o. female who presents to my outpatient office for follow up of Achilles tendinitis of the LLE. At the last visit, I prescribed a Medrol DosePak and Pepcid 40 mg, instructed the patient to continue activity modification as directed, to performing Achilles tendon stretches, and to anticipate an MRI if condition does not improve. Since we saw her last, Ms. Marlee Nelson states that if she wears her CAM walker boot, her pain is tolerable, but without the boot, her pain is significant. She notes that even when she goes to the bathroom in the mornings and comes back to her room, she is ready to go into the boot due to pain. The patient has h/o breast cancer and HTN. She is currently taking gabapentin due to neuropathic pain from a surgical scar over the right breast.     The patient works as a patient care coordinator.       Visit Vitals  BP (!) 176/96   Pulse 68   Temp 96.2 °F (35.7 °C) (Oral)   Ht 5' 6\" (1.676 m)   Wt 213 lb (96.6 kg)   SpO2 93%   BMI 34.38 kg/m²     Appearance: Alert, well appearing and pleasant patient who is in no distress, oriented to person, place/time, and who follows commands. This patient is accompanied in the examination room by her  self. no dementia  Psychiatric: Affect and mood are appropriate. Patient arrives to office via: with assistive device: CAM walker boot  H EENT (2): Head normocephalic & atraumatic. Both pupils are round, non icteric sclera     Eye: EOM are intact and sclera are clear    Neck: ROM WNL       Hearings Intact, does not require hearing aid device  Respiratory: Breathing is unlabored without accessory chest muscle use  Cardiovascular/Peripheral Vascular: Normal Pulses to each  foot      ANKLE/FOOT left    Gait: in CAM walker boot  Tenderness: moderate tender achilles tendon without deformity palpable:  Swelling: Moderate fusiform swelling at the noninsertional point of her achilies tendon. No warmth. Calf tenderness: Absent for calf or gastrocnemius muscle regions   Soft, supple, non tender, non taut lower extremity compartments   NONE to Medial Malleolar, 4/5 Met base midfoot, achilles, tib post, or   NONE to syndesmosis. no to plantar fascia, or central calcaneal region  Cutaneous: WNL  Joint Motion: WNL   Joint / Tendon Stability: No Ankle or Subtalar instability or joint laxity. No peroneal sublux ability or dislocation  Alignment: neutral Hindfoot, none Metatarsus Adductus Metatarsus. Neuro Motor/Sensory: NL/NL  Vascular: NL foot/ankle pulses  Lymphatics: No extremity lymphedema, No calf swelling, no tenderness to calf muscles.      CHART REVIEW     Past Medical History:   Diagnosis Date    Breast cancer ([facility] [address] 2013    right breast    Diabetes ([facility] [address]     Hypercholesterolemia     Hypertension     Insomnia      Current Outpatient Medications Medication Sig    losartan (COZAAR) 25 mg tablet Take 1 Tab by mouth daily.  furosemide (LASIX) 20 mg tablet Take 1 Tab by mouth daily as needed for Other (severe swelling).  metFORMIN (GLUCOPHAGE) 500 mg tablet Take 1 Tab by mouth two (2) times a day.  simvastatin (ZOCOR) 40 mg tablet Take 1 Tab by mouth nightly.  venlafaxine (EFFEXOR) 37.5 mg tablet Take 1 Tab by mouth daily.  zolpidem (AMBIEN) 10 mg tablet Take 1 Tab by mouth nightly as needed for Sleep. Max Daily Amount: 10 mg.    gabapentin (NEURONTIN) 100 mg capsule Take 1 Cap by mouth three (3) times daily. Max Daily Amount: 300 mg.    meloxicam (MOBIC) 15 mg tablet Take 1 Tab by mouth daily (with breakfast). No current facility-administered medications for this visit. Allergies   Allergen Reactions    Aspirin Swelling    Codeine Swelling    Penicillins Swelling     Past Surgical History:   Procedure Laterality Date    HX CARPAL TUNNEL RELEASE Bilateral     HX  SECTION      HX CHOLECYSTECTOMY      HX KNEE ARTHROSCOPY Bilateral     HX MASTECTOMY Right 2013    HX TUBAL LIGATION       Social History     Occupational History    Not on file   Tobacco Use    Smoking status: Current Every Day Smoker    Smokeless tobacco: Never Used   Substance and Sexual Activity    Alcohol use: Yes     Frequency: Never     Comment: occassionl    Drug use: No    Sexual activity: Not Currently     Partners: Male     Family History   Problem Relation Age of Onset   Lane Cancer Mother     COPD Mother     Alcohol abuse Father     Diabetes Father     Hypertension Father     Heart Disease Father     Elevated Lipids Father     Hypertension Brother     Hypertension Maternal Grandmother     Hypertension Maternal Grandfather            REVIEW OF SYSTEMS : 9/10/2019  ALL BELOW ARE Negative except : SEE HPI     General: Negative for fever and chills. No unexpected change in weight. Denies fatigue. No change in appetite.    Dermatologic: Negative for rash or itching, dry skin, hair changes, rash or skin lesion changes  HEENT: Negative for congestion, sore throat, neck pain and neck stiffness. No change in vision or hearing. Hasn't noted any enlarged lymph nodes in the neck. Cardiovascular:  Negative for chest pain and palpitations. Has not noted pedal edema. Peripheral Vascular: No calf pain, vascular vein tenderness to calf pain           No calf throbbing, posterior knee throbbing pain   Respiratory: Negative for cough, colds, sinus, hemoptysis, shortness of breath and wheezing. Gastrointestinal: Negative for nausea and vomiting, rectal bleeding, coffee ground emesis, abdominal pain, diarrhea and constipation. Genitourinary: Negative for dysuria, frequency urgency, or burning on micturition. No flank pain, no foul smelling urine, no difficulty with initiating urination. Hematological: Negative for bleeding or easy bruising. Musculoskeletal: Negative  for arthralgias, back pain or neck pain. Neurological: Negative for dizziness, seizures or syncopal episodes. Denies headaches. Endocrine: Denies excessive thirst.  No heat/cold intolerance. Psychiatric: Negative for depression or insomnia. DIAGNOSTIC IMAGING     No notes on file    Please see above section of this report. I have personally reviewed the results of the above study. The interpretation of this study is my professional opinion. Written by Janina Rivas, as dictated by Dr. Gisel Pedroza. I, Dr. Gisel Pedroza, confirm that all documentation is accurate.

## 2019-09-12 ENCOUNTER — DOCUMENTATION ONLY (OUTPATIENT)
Dept: ORTHOPEDIC SURGERY | Age: 54
End: 2019-09-12

## 2019-09-12 NOTE — PROGRESS NOTES
Patients order for MRI of left ankle, office note and demographics were faxed to [address] [place] [address].    Tel# [phone]  Fax#[phone]

## 2019-09-24 ENCOUNTER — DOCUMENTATION ONLY (OUTPATIENT)
Dept: ORTHOPEDIC SURGERY | Age: 54
End: 2019-09-24

## 2019-09-24 NOTE — PROGRESS NOTES
Called patient left generic VM. When patient calls back please advise her that she has been scheduled at [facility] on 10/08/19 (next available) patient must arrive at 7:15am test at 7:45am. Patient is to bring Id, ins card, list of meds. NO METAL, NO JEWELRY. PLEASE REMIND PATIENT TO GET CD for f/u appt that needs to be made with Dr. Dorothy Rivera. If patient can not make this appt date and time she may call [phone] to reschedule.

## 2019-09-27 NOTE — PROGRESS NOTES
Obtained authorization on web site for [facility]    Auth# [id number]  Dates valid 09/27/19-11/11/19    Copy sent to scan to be placed in patients chart.  Khai Notified of auth #

## 2019-10-10 ENCOUNTER — TELEPHONE (OUTPATIENT)
Dept: ORTHOPEDIC SURGERY | Age: 54
End: 2019-10-10

## 2019-10-10 DIAGNOSIS — M76.62 ACHILLES TENDINITIS OF LEFT LOWER EXTREMITY: Primary | ICD-10-CM

## 2019-10-10 DIAGNOSIS — M25.571 ACUTE RIGHT ANKLE PAIN: ICD-10-CM

## 2019-10-10 NOTE — TELEPHONE ENCOUNTER
Patient missed her MRI appt and her order expires today 10/10/19 [facility] will not r/s her appt until a new order has been placed. Can we please have a new order for MRI of left ankle placed today?

## 2019-10-10 NOTE — TELEPHONE ENCOUNTER
Verbal order read back given by Ann Marie Tucker to place and sign orders for patient.  New order for MRI has been placed

## 2019-10-19 ENCOUNTER — HOSPITAL ENCOUNTER (OUTPATIENT)
Age: 54
Discharge: HOME | End: 2019-10-19
Attending: PHYSICIAN ASSISTANT
Payer: COMMERCIAL

## 2019-10-19 DIAGNOSIS — M76.62 ACHILLES TENDINITIS OF LEFT LOWER EXTREMITY: ICD-10-CM

## 2019-10-19 DIAGNOSIS — M25.571 ACUTE RIGHT ANKLE PAIN: ICD-10-CM

## 2019-10-19 PROCEDURE — 73721 MRI JNT OF LWR EXTRE W/O DYE: CPT

## 2019-10-23 ENCOUNTER — OFFICE VISIT (OUTPATIENT)
Dept: ORTHOPEDIC SURGERY | Age: 54
End: 2019-10-23

## 2019-10-23 VITALS
HEART RATE: 72 BPM | TEMPERATURE: 96.5 F | BODY MASS INDEX: 34.23 KG/M2 | OXYGEN SATURATION: 96 % | DIASTOLIC BLOOD PRESSURE: 102 MMHG | HEIGHT: 66 IN | WEIGHT: 213 LBS | SYSTOLIC BLOOD PRESSURE: 172 MMHG | RESPIRATION RATE: 16 BRPM

## 2019-10-23 DIAGNOSIS — S86.012A ACHILLES TENDON TEAR, LEFT, INITIAL ENCOUNTER: ICD-10-CM

## 2019-10-23 DIAGNOSIS — M76.62 ACHILLES TENDINITIS OF LEFT LOWER EXTREMITY: Primary | ICD-10-CM

## 2019-10-23 NOTE — PROGRESS NOTES
Patient: Keith Rivera                     MRN: [id number]       SSN: [id number]  YOB: 1965                          AGE: 47 y.o. SEX: female    PCP:Amada Jorge NP        Chief Complaint:   Chief Complaint   Patient presents with    Ankle Pain     left ankle pain         HPI:     Keith Rivera is a 47 y.o. female who presents today for evaluation of left Achilles tendonitis. Patient was last seen in the office on 9/10/2019 by Dr. Mitchell Medina. The patient's prior history is detailed in the previous encounter which was reviewed and reveals that the patient her left hindfoot continues to bother her. Dr. Ioncencia Montgomery was:    Plan:    1) MRI without IV Contrast of the left ankle; please assess the Achilles tendon. 2) Continue wearing the CAM boot as directed. 3) Continue activity modification as directed. 4) RTO - after MRI      Since we last saw the patient in the office, Keith Rivera has had MRI of the left Achilles tendon. MRI images were reviewed and discussed with the patient as discussed as below. She continues to wear the CAM walker boot at work ant the [employer] on her day off. She state that her pain is tolerable, but without the boot, her pain is significant. She notes that even when she goes to the bathroom in the mornings and comes back to her room, she is ready to go into the boot due to pain. She continues stretches and soaks which makes her foot feel better. She takes Mobic and Gabapentin. The patient has h/o breast cancer, DM and HTN. She is currently taking gabapentin due to neuropathic pain from a surgical scar over the right breast. She is on Metformin for her DM. She has failed conservative treatment and has severe pain to the left, noninsertional portion of the Achilles tendon and at the insertion point of the left Achilles tendon. Recommendations are listed as below.   She is going to require surgery, for excisional debridement of the left Achilles tendon, possible FHL tendon augmentation. The patient works as a patient care coordinator. She would like to plan her surgery for January 2020 as she has Naina planned for her 8 grandchildren. Pain Assessment  10/23/2019   Location of Pain Ankle   Location Modifiers Left   Severity of Pain 6   Quality of Pain Aching   Duration of Pain A few hours   Frequency of Pain Intermittent   Aggravating Factors Stairs;Standing;Bending;Walking   Limiting Behavior -   Relieving Factors Elevation;Heat;Ice;Rest   Result of Injury No        Pain Location: Ankle      REVIEW OF SYSTEMS:     Review of Systems: Chest pain: no  Shortness of breath: no  Fever: no  Night sweats: no  Weight loss: no  Constitutional signs: no. Negative for fever, chills and unexpected weight loss. The patient complains of left hindfoot pain otherwise, the remainder of the review of systems is negative except as mentioned in the HPI. PHYSICAL EXAM:     Visit Vitals  BP (!) 172/102   Pulse 72   Temp 96.5 °F (35.8 °C) (Oral)   Resp 16   Ht 5' 6\" (1.676 m)   Wt 213 lb (96.6 kg)   SpO2 96%   BMI 34.38 kg/m²       Pain Scale: 6/10      Appearance: Francie Arambula is an alert, well appearing, 47 y.o. female who is oriented to person, place/time, and who follows commands. Seated comfortably in no acute distress. Speech normal in context and clarity. Current BMI is: Body mass index is 34.38 kg/m². Patient arrives to office via: with assistive device: CAM boot   Patient accompanied in the examination room  by: self    PSYCHIATRIC: Affect, mood, judgement, behavior and conduct are appropriate. Memory grossly intact, no dementia    LYMPHATIC: lymph nodes are not enlarged and are within normal limits  SKIN: normal in color and non tender to palpation. There are no bruises or abrasions noted. NEUROVASCULAR: Motor sensory grossly intact, exam is within normal limits. No sensory deficits noted. Reflexes are equal bilaterally.  There is normal sensation to pinprick and light touch. No significant edema or embolic phenomena to foot/toes, hand/fingers. Extremities are warm and appear well perfused. Positive distal pulses and capillary refill. No varicosities noted. No apparent venous status noted. No pitting edema noted. MOTOR/STRENGTH/TONE: strength WNL in tested muscle groups no fasciculations or no involuntary movements noted     MUSCULOSKELETAL:      ANKLE/FOOT left    Gait: in CAM walker boot  Tenderness: moderate tender achilles tendon without deformity palpable:  Swelling: Moderate fusiform swelling at the noninsertional point of her achilies tendon. No warmth. Calf tenderness: Absent for calf or gastrocnemius muscle regions   Soft, supple, non tender, non taut lower extremity compartments   NONE to Medial Malleolar, 4/5 Met base midfoot, achilles, tib post, or   NONE to syndesmosis. no to plantar fascia, or central calcaneal region  Cutaneous: WNL  Joint Motion: WNL   Joint / Tendon Stability: No Ankle or Subtalar instability or joint laxity. No peroneal sublux ability or dislocation  Alignment: neutral Hindfoot, none Metatarsus Adductus Metatarsus. Neuro Motor/Sensory: NL/NL  Vascular: NL foot/ankle pulses  Lymphatics: No extremity lymphedema, No calf swelling, no tenderness to calf muscles. IMPRESSION:     1. Achilles tendinitis of left lower extremity    2. Achilles tendon tear, left, initial encounter          PLAN:         No orders of the defined types were placed in this encounter. Continue activity as tolerated  Continue to wear CAM boot, wear hiking boot on right foot   You may purchase ProCare shoe lift from [employer] - cost is approximately $25.00  Follow up in December for preoperative history and physical or sooner as needed  Rolando Aguirre will be in contact with you to schedule your surgery                Patient has been discussed by Dr. Roberto Domingo during this visit and he agrees with the assessment and plan. Patient understands treatment plan and has been provided with patient education. PAST MEDICAL HISTORY:     Past Medical History:   Diagnosis Date    Breast cancer [facility]) 2013    right breast    Diabetes ([facility] [address]     Hypercholesterolemia     Hypertension     Insomnia        MEDICATIONS:     Current Outpatient Medications   Medication Sig    losartan (COZAAR) 25 mg tablet Take 1 Tab by mouth daily.  furosemide (LASIX) 20 mg tablet Take 1 Tab by mouth daily as needed for Other (severe swelling).  metFORMIN (GLUCOPHAGE) 500 mg tablet Take 1 Tab by mouth two (2) times a day.  simvastatin (ZOCOR) 40 mg tablet Take 1 Tab by mouth nightly.  venlafaxine (EFFEXOR) 37.5 mg tablet Take 1 Tab by mouth daily.  zolpidem (AMBIEN) 10 mg tablet Take 1 Tab by mouth nightly as needed for Sleep. Max Daily Amount: 10 mg.    gabapentin (NEURONTIN) 100 mg capsule Take 1 Cap by mouth three (3) times daily. Max Daily Amount: 300 mg.    meloxicam (MOBIC) 15 mg tablet Take 1 Tab by mouth daily (with breakfast). No current facility-administered medications for this visit.         ALLERGIES:     Allergies   Allergen Reactions    Aspirin Swelling    Codeine Swelling    Penicillins Swelling         PAST SURGICAL HISTORY:     Past Surgical History:   Procedure Laterality Date    HX CARPAL TUNNEL RELEASE Bilateral     HX  SECTION      HX CHOLECYSTECTOMY      HX KNEE ARTHROSCOPY Bilateral     HX MASTECTOMY Right 2013    HX TUBAL LIGATION         SOCIAL HISTORY:     Social History     Socioeconomic History    Marital status:      Spouse name: Not on file    Number of children: Not on file    Years of education: Not on file    Highest education level: Not on file   Occupational History    Not on file   Social Needs    Financial resource strain: Not on file    Food insecurity:     Worry: Not on file     Inability: Not on file    Transportation needs: Medical: Not on file     Non-medical: Not on file   Tobacco Use    Smoking status: Current Every Day Smoker    Smokeless tobacco: Never Used   Substance and Sexual Activity    Alcohol use: Yes     Frequency: Never     Comment: occassionl    Drug use: No    Sexual activity: Not Currently     Partners: Male   Lifestyle    Physical activity:     Days per week: Not on file     Minutes per session: Not on file    Stress: Not on file   Relationships    Social connections:     Talks on phone: Not on file     Gets together: Not on file     Attends Ten[employer] service: Not on file     Active member of club or organization: Not on file     Attends meetings of clubs or organizations: Not on file     Relationship status: Not on file    Intimate partner violence:     Fear of current or ex partner: Not on file     Emotionally abused: Not on file     Physically abused: Not on file     Forced sexual activity: Not on file   Other Topics Concern    Not on file   Social History Narrative    Not on file       FAMILY HISTORY:     Family History   Problem Relation Age of Onset    Cancer Mother     COPD Mother     Alcohol abuse Father     Diabetes Father     Hypertension Father     Heart Disease Father     Elevated Lipids Father     Hypertension Brother     Hypertension Maternal Grandmother     Hypertension Maternal Grandfather          RADIOGRAPHS & DIAGNOSTIC STUDIES     MRI Results (most recent):  Results from Hospital Encounter encounter on 10/19/19   MRI ANKLE LT WO CONT    Narrative EXAMINATION: MRI left ankle without contrast    INDICATION: Achilles tendinitis, left ankle pain    COMPARISON: None    TECHNIQUE: Multiplanar multiecho MRI sequences of the left ankle performed  without contrast.    FINDINGS:    Bones/joints: Chronic appearing ossific fragment below the medial malleolus. Small Achilles enthesophyte. Large plantar calcaneal spur. No acute fracture.   Multifocal mild TMT degenerative changes. Ligaments:  -Calcaneofibular ligament intact. Anterior talofibular ligament poorly defined. Posterior talofibular ligament intact. Anterior and posterior tibiofibular  ligaments appear intact. -Deltoid ligament the fibers with intrasubstance signal changes and  irregularity. Spring ligament intact. Lisfranc ligament intact. Tendons:  -Distal Achilles tendinopathy characterized by thickening, intrasubstance  signal, and low-grade partial-thickness deep surface and interstitial tearing at  the insertion. Possible small avulsion fracture fragment lying anterior to the  distal Achilles. Total length of abnormal tendon is roughly 7.4 cm.  -Peroneal tendons intact.  -Flexor tendons intact. -Extensor tendons intact. Miscellaneous: Minimal edema in the sinus tarsi with fat signal largely  preserved. Central band plantar fascia thickening. Small subtalar joint  effusion. Impression IMPRESSION:    Partial thickness tear of the distal Achilles tendon with underlying  tendinopathy as detailed above. -Possible small avulsion fracture fragment lying anterior to the distal Achilles  tendon just above insertion. Recommend correlation with radiographs. Age-indeterminate anterior talofibular ligament injury, probably nonacute. Chronic appearing central band plantar fasciitis. See additional details above.                Chilo Toth PA-C  10/23/2019   5:16 PM

## 2019-10-23 NOTE — PROGRESS NOTES
AMBULATORY PROGRESS NOTE Patient: Louisa Smiley             MRN: [id number]     SSN: [id number] There is no height or weight on file to calculate BMI. YOB: 1965     AGE: 47 y.o. SEX: female PCP: Penny Byers NP IMPRESSION/DIAGNOSIS AND TREATMENT PLAN  
 
DIAGNOSES No diagnosis found. No orders of the defined types were placed in this encounter. Louisa Smiley understands her diagnoses and the proposed plan. Plan: 
 
1)  
2) RTO -  
 
 HPI AND EXAMINATION Louisa Smiley IS A 47 y.o. female who presents to my outpatient office for follow up of Achilles tendinitis of the LLE. At the last visit, I ordered an MRI of the left ankle, instructed the patient to continue activity modification as directed, and to continue wearing the CAM walker boot as directed. Since we saw her last, Ms. Lorenzo Medrano The patient has h/o breast cancer and HTN. She is currently taking gabapentin due to neuropathic pain from a surgical scar over the right breast.  
 
The patient works as a patient care coordinator. There were no vitals taken for this visit. Appearance: Alert, well appearing and pleasant patient who is in no distress, oriented to person, place/time, and who follows commands. This patient is accompanied in the examination room by her  self. no dementia Psychiatric: Affect and mood are appropriate. Patient arrives to office via: with assistive device: CAM walker boot H EENT (2): Head normocephalic & atraumatic. Both pupils are round, non icteric sclera Eye: EOM are intact and sclera are clear Neck: ROM WNL Hearings Intact, does not require hearing aid device Respiratory: Breathing is unlabored without accessory chest muscle use Cardiovascular/Peripheral Vascular: Normal Pulses to each  foot ANKLE/FOOT left Gait: in CAM walker boot Tenderness: moderate tender achilles tendon without deformity palpable: Swelling: Moderate fusiform swelling at the noninsertional point of her achilies tendon. No warmth. Calf tenderness: Absent for calf or gastrocnemius muscle regions Soft, supple, non tender, non taut lower extremity compartments NONE to Medial Malleolar, 4/5 Met base midfoot, achilles, tib post, or 
 NONE to syndesmosis. no to plantar fascia, or central calcaneal region Cutaneous: WNL Joint Motion: WNL Joint / Tendon Stability: No Ankle or Subtalar instability or joint laxity. No peroneal sublux ability or dislocation Alignment: neutral Hindfoot, none Metatarsus Adductus Metatarsus. Neuro Motor/Sensory: NL/NL Vascular: NL foot/ankle pulses Lymphatics: No extremity lymphedema, No calf swelling, no tenderness to calf muscles. CHART REVIEW Past Medical History:  
Diagnosis Date  Breast cancer ([facility] [address] 2013  
 right breast  
 Diabetes ([facility] [address]  Hypercholesterolemia  Hypertension  Insomnia Current Outpatient Medications Medication Sig  
 losartan (COZAAR) 25 mg tablet Take 1 Tab by mouth daily.  furosemide (LASIX) 20 mg tablet Take 1 Tab by mouth daily as needed for Other (severe swelling).  metFORMIN (GLUCOPHAGE) 500 mg tablet Take 1 Tab by mouth two (2) times a day.  simvastatin (ZOCOR) 40 mg tablet Take 1 Tab by mouth nightly.  venlafaxine (EFFEXOR) 37.5 mg tablet Take 1 Tab by mouth daily.  zolpidem (AMBIEN) 10 mg tablet Take 1 Tab by mouth nightly as needed for Sleep. Max Daily Amount: 10 mg.  
 gabapentin (NEURONTIN) 100 mg capsule Take 1 Cap by mouth three (3) times daily. Max Daily Amount: 300 mg.  
 meloxicam (MOBIC) 15 mg tablet Take 1 Tab by mouth daily (with breakfast). No current facility-administered medications for this visit. Allergies Allergen Reactions  Aspirin Swelling  Codeine Swelling  Penicillins Swelling Past Surgical History:  
Procedure Laterality Date  HX CARPAL TUNNEL RELEASE Bilateral   
 HX  SECTION    
 HX CHOLECYSTECTOMY  HX KNEE ARTHROSCOPY Bilateral   
 HX MASTECTOMY Right 2013  HX TUBAL LIGATION Social History Occupational History  Not on file Tobacco Use  Smoking status: Current Every Day Smoker  Smokeless tobacco: Never Used Substance and Sexual Activity  Alcohol use: Yes Frequency: Never Comment: occassionl  Drug use: No  
 Sexual activity: Not Currently Partners: Male Family History Problem Relation Age of Onset  Cancer Mother  COPD Mother  Alcohol abuse Father  Diabetes Father  Hypertension Father  Heart Disease Father  Elevated Lipids Father  Hypertension Brother  Hypertension Maternal Grandmother  Hypertension Maternal Grandfather REVIEW OF SYSTEMS : 10/23/2019  ALL BELOW ARE Negative except : SEE HPI General: Negative for fever and chills. No unexpected change in weight. Denies fatigue. No change in appetite. Dermatologic: Negative for rash or itching, dry skin, hair changes, rash or skin lesion changes HEENT: Negative for congestion, sore throat, neck pain and neck stiffness. No change in vision or hearing. Hasn't noted any enlarged lymph nodes in the neck. Cardiovascular:  Negative for chest pain and palpitations. Has not noted pedal edema. Peripheral Vascular: No calf pain, vascular vein tenderness to calf pain No calf throbbing, posterior knee throbbing pain Respiratory: Negative for cough, colds, sinus, hemoptysis, shortness of breath and wheezing. Gastrointestinal: Negative for nausea and vomiting, rectal bleeding, coffee ground emesis, abdominal pain, diarrhea and constipation. Genitourinary: Negative for dysuria, frequency urgency, or burning on micturition. No flank pain, no foul smelling urine, no difficulty with initiating urination. Hematological: Negative for bleeding or easy bruising. Musculoskeletal: Negative  for arthralgias, back pain or neck pain. Neurological: Negative for dizziness, seizures or syncopal episodes. Denies headaches. Endocrine: Denies excessive thirst.  No heat/cold intolerance. Psychiatric: Negative for depression or insomnia. DIAGNOSTIC IMAGING No notes on file MRI Results (most recent): 
Results from Hospital Encounter encounter on 10/19/19 MRI ANKLE LT WO CONT Narrative EXAMINATION: MRI left ankle without contrast 
 
INDICATION: Achilles tendinitis, left ankle pain COMPARISON: None TECHNIQUE: Multiplanar multiecho MRI sequences of the left ankle performed 
without contrast. 
 
FINDINGS: 
 
Bones/joints: Chronic appearing ossific fragment below the medial malleolus. Small Achilles enthesophyte. Large plantar calcaneal spur. No acute fracture. Multifocal mild TMT degenerative changes. Ligaments: 
-Calcaneofibular ligament intact. Anterior talofibular ligament poorly defined. Posterior talofibular ligament intact. Anterior and posterior tibiofibular 
ligaments appear intact. -Deltoid ligament the fibers with intrasubstance signal changes and 
irregularity. Spring ligament intact. Lisfranc ligament intact. Tendons: 
-Distal Achilles tendinopathy characterized by thickening, intrasubstance 
signal, and low-grade partial-thickness deep surface and interstitial tearing at 
the insertion. Possible small avulsion fracture fragment lying anterior to the 
distal Achilles. Total length of abnormal tendon is roughly 7.4 cm. 
-Peroneal tendons intact. 
-Flexor tendons intact. -Extensor tendons intact. Miscellaneous: Minimal edema in the sinus tarsi with fat signal largely 
preserved. Central band plantar fascia thickening. Small subtalar joint 
effusion. Impression IMPRESSION: 
 
Partial thickness tear of the distal Achilles tendon with underlying 
tendinopathy as detailed above. -Possible small avulsion fracture fragment lying anterior to the distal Achilles 
tendon just above insertion. Recommend correlation with radiographs. Age-indeterminate anterior talofibular ligament injury, probably nonacute. Chronic appearing central band plantar fasciitis. See additional details above. Please see above section of this report. I have personally reviewed the results of the above study. The interpretation of this study is my professional opinion. Written by Marcy Florez, as dictated by Dr. Saurav Sorenson. I, Dr. Saurav Sorenson, confirm that all documentation is accurate.

## 2019-10-23 NOTE — PATIENT INSTRUCTIONS
Continue activity as tolerated  Continue to wear CAM boot, wear hiking boot on right foot   You may purchase ProCare shoe lift from [employer] - cost is approximately $25.00  Follow up in December for preoperative history and physical or sooner as needed  Naomy Scales will be in contact with you to schedule your surgery         Achilles Tendon Tear: Care Instructions  Your Care Instructions    You have ruptured or torn your Achilles tendon. The Achilles tendon (also called the heel cord) connects the calf muscles on the back of the lower leg to the bone at the base of the heel. Treatment for an Achilles tendon injury depends on whether the tendon has been partially torn or completely ruptured. A cast or splint can often treat a partial tear. If your tendon has ruptured, you may need surgery. You and your orthopedic doctor will choose a treatment plan, so it is important to go to any follow-up appointments. Follow-up care is a key part of your treatment and safety. Be sure to make and go to all appointments, and call your doctor if you are having problems. It's also a good idea to know your test results and keep a list of the medicines you take. How can you care for yourself at home? · Prop up the sore foot on a pillow anytime you sit or lie down during the next 3 days. Try to keep it above the level of your heart. This will help reduce swelling. · Take pain medicines exactly as directed. ? If the doctor gave you a prescription medicine for pain, take it as prescribed. ? If you are not taking a prescription pain medicine, ask your doctor if you can take an over-the-counter medicine. · Do not put weight on the affected foot until your doctor says you can. Use crutches or a walker. · Wear the splint or cast as directed until your doctor says you can remove it. When should you call for help? Call 911 anytime you think you may need emergency care.  For example, call if:    · You have chest pain, are short of breath, or you cough up blood.    Call your doctor now or seek immediate medical care if:    · You have new or worse pain.     · Your foot is cool or pale or changes color.     · You have tingling, weakness, or numbness in your toes.     · Your cast or splint feels too tight.     · You have signs of a blood clot in your leg (called a deep vein thrombosis), such as:  ? Pain in your calf, back of the knee, thigh, or groin. ? Redness or swelling in your leg.    Watch closely for changes in your health, and be sure to contact your doctor if:    · You have a problem with your splint or cast.     · You do not get better as expected. Where can you learn more? Go to http://aminta-mely.info/. Enter F495 in the search box to learn more about \"Achilles Tendon Tear: Care Instructions. \"  Current as of: June 26, 2019  Content Version: 12.2  © 0211-7296 [employer]. Care instructions adapted under license by [employer] (which disclaims liability or warranty for this information). If you have questions about a medical condition or this instruction, always ask your healthcare professional. [address] any warranty or liability for your use of this information.

## 2019-10-23 NOTE — PROGRESS NOTES
1. Have you been to the ER, urgent care clinic since your last visit? Hospitalized since your last visit? No    2. Have you seen or consulted any other health care providers outside of the [address] [place] [address] since your last visit? Include any pap smears or colon screening.  No

## 2019-12-18 ENCOUNTER — TELEPHONE (OUTPATIENT)
Dept: ORTHOPEDIC SURGERY | Age: 54
End: 2019-12-18

## 2019-12-18 NOTE — TELEPHONE ENCOUNTER
Patient states she was supposed to be scheduled for SX and she has not been contacted as of yet. She was expecting to have an H&P today as well. She states this was not scheduled either. She called earlier this morning to speak to River. She has not heard back. Please advise.     [phone]

## 2019-12-19 DIAGNOSIS — Z01.818 PREOP EXAMINATION: Primary | ICD-10-CM

## 2019-12-23 DIAGNOSIS — G47.00 INSOMNIA, UNSPECIFIED TYPE: ICD-10-CM

## 2019-12-23 NOTE — TELEPHONE ENCOUNTER
Requested Prescriptions     Pending Prescriptions Disp Refills    zolpidem (AMBIEN) 10 mg tablet 90 Tab 1     Sig: Take 1 Tab by mouth nightly as needed for Sleep.  Max Daily Amount: 10 mg.     Sotero AKIN #

## 2019-12-24 RX ORDER — ZOLPIDEM TARTRATE 10 MG/1
10 TABLET ORAL
Qty: 30 TAB | Refills: 0 | Status: SHIPPED | OUTPATIENT
Start: 2019-12-24 | End: 2020-01-21 | Stop reason: SDUPTHER

## 2019-12-24 NOTE — TELEPHONE ENCOUNTER
Refilled for 30 days, she needs to be seen for appointment for further refills. I see she already has one with Dr. Deshawn Barnett upcoming. Controlled substance so she'll have to come pick it up, printed today. In locked cabinet at nurses station.

## 2019-12-26 NOTE — TELEPHONE ENCOUNTER
Left message for patient in reference to medication refill. Advised that it was here in the office available for .

## 2020-01-11 ENCOUNTER — HOSPITAL ENCOUNTER (OUTPATIENT)
Dept: GENERAL RADIOLOGY | Age: 55
Discharge: HOME | End: 2020-01-11
Payer: COMMERCIAL

## 2020-01-11 DIAGNOSIS — Z01.818 PREOP EXAMINATION: ICD-10-CM

## 2020-01-11 PROCEDURE — 71046 X-RAY EXAM CHEST 2 VIEWS: CPT

## 2020-01-14 ENCOUNTER — HOSPITAL ENCOUNTER (OUTPATIENT)
Dept: LAB | Age: 55
Discharge: HOME | End: 2020-01-14
Payer: COMMERCIAL

## 2020-01-14 DIAGNOSIS — Z01.818 PREOP EXAMINATION: ICD-10-CM

## 2020-01-14 LAB
ALBUMIN SERPL-MCNC: 4 G/DL (ref 3.4–5)
ALBUMIN/GLOB SERPL: 1.3 {RATIO} (ref 0.8–1.7)
ALP SERPL-CCNC: 89 U/L (ref 45–117)
ALT SERPL-CCNC: 67 U/L (ref 13–56)
ANION GAP SERPL CALC-SCNC: 5 MMOL/L (ref 3–18)
APPEARANCE UR: CLEAR
APTT PPP: 27.7 SEC (ref 23–36.4)
AST SERPL-CCNC: 50 U/L (ref 10–38)
ATRIAL RATE: 73 BPM
BACTERIA URNS QL MICRO: ABNORMAL /HPF
BASOPHILS # BLD: 0 K/UL (ref 0–0.1)
BASOPHILS NFR BLD: 0 % (ref 0–2)
BILIRUB SERPL-MCNC: 0.7 MG/DL (ref 0.2–1)
BILIRUB UR QL: NEGATIVE
BUN SERPL-MCNC: 8 MG/DL (ref 7–18)
BUN/CREAT SERPL: 10 (ref 12–20)
CALCIUM SERPL-MCNC: 10.1 MG/DL (ref 8.5–10.1)
CALCULATED P AXIS, ECG09: 60 DEGREES
CALCULATED R AXIS, ECG10: -18 DEGREES
CALCULATED T AXIS, ECG11: 25 DEGREES
CHLORIDE SERPL-SCNC: 106 MMOL/L (ref 100–111)
CO2 SERPL-SCNC: 32 MMOL/L (ref 21–32)
COLOR UR: YELLOW
CREAT SERPL-MCNC: 0.78 MG/DL (ref 0.6–1.3)
DIAGNOSIS, 93000: NORMAL
DIFFERENTIAL METHOD BLD: ABNORMAL
EOSINOPHIL # BLD: 0.2 K/UL (ref 0–0.4)
EOSINOPHIL NFR BLD: 2 % (ref 0–5)
EPITH CASTS URNS QL MICRO: ABNORMAL /LPF (ref 0–5)
ERYTHROCYTE [DISTWIDTH] IN BLOOD BY AUTOMATED COUNT: 13.7 % (ref 11.6–14.5)
GLOBULIN SER CALC-MCNC: 3.1 G/DL (ref 2–4)
GLUCOSE SERPL-MCNC: 148 MG/DL (ref 74–99)
GLUCOSE UR STRIP.AUTO-MCNC: NEGATIVE MG/DL
HBA1C MFR BLD: 7.3 % (ref 4.2–5.6)
HCT VFR BLD AUTO: 51 % (ref 35–45)
HGB BLD-MCNC: 17.2 G/DL (ref 12–16)
HGB UR QL STRIP: NEGATIVE
HYALINE CASTS URNS QL MICRO: ABNORMAL /LPF (ref 0–2)
KETONES UR QL STRIP.AUTO: NEGATIVE MG/DL
LEUKOCYTE ESTERASE UR QL STRIP.AUTO: NEGATIVE
LYMPHOCYTES # BLD: 2.8 K/UL (ref 0.9–3.6)
LYMPHOCYTES NFR BLD: 29 % (ref 21–52)
MCH RBC QN AUTO: 31.2 PG (ref 24–34)
MCHC RBC AUTO-ENTMCNC: 33.7 G/DL (ref 31–37)
MCV RBC AUTO: 92.4 FL (ref 74–97)
MONOCYTES # BLD: 0.7 K/UL (ref 0.05–1.2)
MONOCYTES NFR BLD: 7 % (ref 3–10)
NEUTS SEG # BLD: 6 K/UL (ref 1.8–8)
NEUTS SEG NFR BLD: 62 % (ref 40–73)
NITRITE UR QL STRIP.AUTO: NEGATIVE
P-R INTERVAL, ECG05: 144 MS
PH UR STRIP: 5 [PH] (ref 5–8)
PLATELET # BLD AUTO: 224 K/UL (ref 135–420)
PMV BLD AUTO: 10.8 FL (ref 9.2–11.8)
POTASSIUM SERPL-SCNC: 4.3 MMOL/L (ref 3.5–5.5)
PROT SERPL-MCNC: 7.1 G/DL (ref 6.4–8.2)
PROT UR STRIP-MCNC: 30 MG/DL
Q-T INTERVAL, ECG07: 434 MS
QRS DURATION, ECG06: 96 MS
QTC CALCULATION (BEZET), ECG08: 478 MS
RBC # BLD AUTO: 5.52 M/UL (ref 4.2–5.3)
RBC #/AREA URNS HPF: ABNORMAL /HPF (ref 0–5)
SODIUM SERPL-SCNC: 143 MMOL/L (ref 136–145)
SP GR UR REFRACTOMETRY: 1.01 (ref 1–1.03)
UROBILINOGEN UR QL STRIP.AUTO: 0.2 EU/DL (ref 0.2–1)
VENTRICULAR RATE, ECG03: 73 BPM
WBC # BLD AUTO: 9.7 K/UL (ref 4.6–13.2)
WBC URNS QL MICRO: ABNORMAL /HPF (ref 0–4)
YEAST URNS QL MICRO: ABNORMAL

## 2020-01-14 PROCEDURE — 81001 URINALYSIS AUTO W/SCOPE: CPT

## 2020-01-14 PROCEDURE — 93005 ELECTROCARDIOGRAM TRACING: CPT

## 2020-01-14 PROCEDURE — 85025 COMPLETE CBC W/AUTO DIFF WBC: CPT

## 2020-01-14 PROCEDURE — 83036 HEMOGLOBIN GLYCOSYLATED A1C: CPT

## 2020-01-14 PROCEDURE — 87086 URINE CULTURE/COLONY COUNT: CPT

## 2020-01-14 PROCEDURE — 36415 COLL VENOUS BLD VENIPUNCTURE: CPT

## 2020-01-14 PROCEDURE — 85730 THROMBOPLASTIN TIME PARTIAL: CPT

## 2020-01-14 PROCEDURE — 80053 COMPREHEN METABOLIC PANEL: CPT

## 2020-01-15 LAB
BACTERIA SPEC CULT: NORMAL
SERVICE CMNT-IMP: NORMAL

## 2020-01-21 ENCOUNTER — OFFICE VISIT (OUTPATIENT)
Dept: ORTHOPEDIC SURGERY | Age: 55
End: 2020-01-21

## 2020-01-21 ENCOUNTER — TELEPHONE (OUTPATIENT)
Dept: ORTHOPEDIC SURGERY | Age: 55
End: 2020-01-21

## 2020-01-21 ENCOUNTER — OFFICE VISIT (OUTPATIENT)
Dept: FAMILY MEDICINE CLINIC | Age: 55
End: 2020-01-21

## 2020-01-21 ENCOUNTER — DOCUMENTATION ONLY (OUTPATIENT)
Dept: ORTHOPEDIC SURGERY | Age: 55
End: 2020-01-21

## 2020-01-21 VITALS
RESPIRATION RATE: 12 BRPM | DIASTOLIC BLOOD PRESSURE: 99 MMHG | SYSTOLIC BLOOD PRESSURE: 167 MMHG | OXYGEN SATURATION: 100 % | HEART RATE: 69 BPM | BODY MASS INDEX: 34.07 KG/M2 | HEIGHT: 66 IN | WEIGHT: 212 LBS

## 2020-01-21 VITALS
BODY MASS INDEX: 34.07 KG/M2 | HEART RATE: 76 BPM | TEMPERATURE: 95.5 F | OXYGEN SATURATION: 96 % | SYSTOLIC BLOOD PRESSURE: 156 MMHG | RESPIRATION RATE: 18 BRPM | DIASTOLIC BLOOD PRESSURE: 95 MMHG | WEIGHT: 212 LBS | HEIGHT: 66 IN

## 2020-01-21 DIAGNOSIS — M25.571 ACUTE RIGHT ANKLE PAIN: ICD-10-CM

## 2020-01-21 DIAGNOSIS — M76.62 ACHILLES TENDINITIS OF LEFT LOWER EXTREMITY: ICD-10-CM

## 2020-01-21 DIAGNOSIS — I10 ESSENTIAL HYPERTENSION: ICD-10-CM

## 2020-01-21 DIAGNOSIS — Z01.818 PRE-OPERATIVE EXAMINATION: ICD-10-CM

## 2020-01-21 DIAGNOSIS — E08.00 DIABETES MELLITUS DUE TO UNDERLYING CONDITION WITH HYPEROSMOLARITY WITHOUT COMA, WITHOUT LONG-TERM CURRENT USE OF INSULIN (HCC): ICD-10-CM

## 2020-01-21 DIAGNOSIS — E66.01 SEVERE OBESITY (HCC): ICD-10-CM

## 2020-01-21 DIAGNOSIS — M76.61 TENDONITIS, ACHILLES, RIGHT: ICD-10-CM

## 2020-01-21 DIAGNOSIS — R23.2 HOT FLASHES: ICD-10-CM

## 2020-01-21 DIAGNOSIS — G47.00 INSOMNIA, UNSPECIFIED TYPE: ICD-10-CM

## 2020-01-21 DIAGNOSIS — M76.62 ACHILLES TENDINITIS OF LEFT LOWER EXTREMITY: Primary | ICD-10-CM

## 2020-01-21 DIAGNOSIS — R39.15 URINARY URGENCY: ICD-10-CM

## 2020-01-21 DIAGNOSIS — E11.9 TYPE 2 DIABETES MELLITUS WITHOUT COMPLICATION, WITHOUT LONG-TERM CURRENT USE OF INSULIN (HCC): ICD-10-CM

## 2020-01-21 DIAGNOSIS — Z85.3 HISTORY OF RIGHT BREAST CANCER: ICD-10-CM

## 2020-01-21 DIAGNOSIS — Z01.818 PREOP EXAMINATION: Primary | ICD-10-CM

## 2020-01-21 DIAGNOSIS — E78.00 HYPERCHOLESTEROLEMIA: ICD-10-CM

## 2020-01-21 RX ORDER — NITROFURANTOIN 25; 75 MG/1; MG/1
100 CAPSULE ORAL 2 TIMES DAILY
Qty: 14 CAP | Refills: 0 | Status: SHIPPED | OUTPATIENT
Start: 2020-01-21 | End: 2020-02-12 | Stop reason: SDUPTHER

## 2020-01-21 RX ORDER — ZOLPIDEM TARTRATE 10 MG/1
10 TABLET ORAL
Qty: 30 TAB | Refills: 0 | Status: SHIPPED | OUTPATIENT
Start: 2020-02-21 | End: 2020-01-21 | Stop reason: SDUPTHER

## 2020-01-21 RX ORDER — METFORMIN HYDROCHLORIDE 500 MG/1
500 TABLET ORAL 2 TIMES DAILY
Qty: 180 TAB | Refills: 1 | Status: SHIPPED | OUTPATIENT
Start: 2020-01-21 | End: 2020-04-07 | Stop reason: SDUPTHER

## 2020-01-21 RX ORDER — SIMVASTATIN 40 MG/1
40 TABLET, FILM COATED ORAL
Qty: 90 TAB | Refills: 1 | Status: SHIPPED | OUTPATIENT
Start: 2020-01-21 | End: 2020-04-07 | Stop reason: SDUPTHER

## 2020-01-21 RX ORDER — GABAPENTIN 100 MG/1
100 CAPSULE ORAL 3 TIMES DAILY
Qty: 90 CAP | Refills: 0 | Status: SHIPPED | OUTPATIENT
Start: 2020-01-21 | End: 2020-01-21 | Stop reason: SDUPTHER

## 2020-01-21 RX ORDER — PROMETHAZINE HYDROCHLORIDE 25 MG/1
25 TABLET ORAL
Qty: 30 TAB | Refills: 0 | Status: SHIPPED | OUTPATIENT
Start: 2020-01-21 | End: 2020-01-21

## 2020-01-21 RX ORDER — LOSARTAN POTASSIUM 50 MG/1
50 TABLET ORAL DAILY
Qty: 90 TAB | Refills: 1 | Status: SHIPPED | OUTPATIENT
Start: 2020-01-21 | End: 2020-04-07 | Stop reason: DRUGHIGH

## 2020-01-21 RX ORDER — GABAPENTIN 100 MG/1
100 CAPSULE ORAL 3 TIMES DAILY
Qty: 90 CAP | Refills: 0 | Status: SHIPPED | OUTPATIENT
Start: 2020-02-21 | End: 2020-01-21 | Stop reason: SDUPTHER

## 2020-01-21 RX ORDER — GABAPENTIN 100 MG/1
100 CAPSULE ORAL 3 TIMES DAILY
Qty: 90 CAP | Refills: 0 | Status: SHIPPED | OUTPATIENT
Start: 2020-03-21 | End: 2020-04-07 | Stop reason: SDUPTHER

## 2020-01-21 RX ORDER — POLYETHYLENE GLYCOL 3350 17 G/17G
17 POWDER, FOR SOLUTION ORAL DAILY
Qty: 10 PACKET | Refills: 1 | Status: SHIPPED | OUTPATIENT
Start: 2020-01-21 | End: 2020-04-07 | Stop reason: SDUPTHER

## 2020-01-21 RX ORDER — MELOXICAM 15 MG/1
15 TABLET ORAL
Qty: 90 TAB | Refills: 0 | Status: SHIPPED | OUTPATIENT
Start: 2020-01-21 | End: 2020-01-21

## 2020-01-21 RX ORDER — ZOLPIDEM TARTRATE 10 MG/1
10 TABLET ORAL
Qty: 30 TAB | Refills: 0 | Status: SHIPPED | OUTPATIENT
Start: 2020-03-21 | End: 2020-04-07 | Stop reason: SDUPTHER

## 2020-01-21 RX ORDER — ZOLPIDEM TARTRATE 10 MG/1
10 TABLET ORAL
Qty: 30 TAB | Refills: 0 | Status: SHIPPED | OUTPATIENT
Start: 2020-01-21 | End: 2020-01-21 | Stop reason: SDUPTHER

## 2020-01-21 RX ORDER — FUROSEMIDE 20 MG/1
20 TABLET ORAL
Qty: 90 TAB | Refills: 1 | Status: SHIPPED | OUTPATIENT
Start: 2020-01-21 | End: 2021-03-22 | Stop reason: SDUPTHER

## 2020-01-21 RX ORDER — MEPERIDINE HYDROCHLORIDE 50 MG/1
25-50 TABLET ORAL
Qty: 28 TAB | Refills: 0 | Status: SHIPPED | OUTPATIENT
Start: 2020-01-21 | End: 2020-01-22 | Stop reason: SDUPTHER

## 2020-01-21 RX ORDER — VENLAFAXINE 37.5 MG/1
37.5 TABLET ORAL DAILY
Qty: 90 TAB | Refills: 1 | Status: SHIPPED | OUTPATIENT
Start: 2020-01-21 | End: 2020-04-07 | Stop reason: SDUPTHER

## 2020-01-21 NOTE — PATIENT INSTRUCTIONS
Dr. Franci Hughes Pre-operative Instructions: 
 
 
Patient: Ludwin Vicente :  1965 I understand I am to stop taking oral birth control pills, hormonal replacement therapy and all Aspirin, Aspirin containing medications, Non-Steroidal Anti-Inflammatory medications (such as Advil, Aleve, Motrin, Ibuprofen) and or Blood thinner medication such as Coumadin, Plavix, Heparin or others 5 days prior to surgery. I understand I am to STOP taking these Medications 5 days prior to surgery: 
I am to get instructions from my prescribing physician. 1. __As listed above_______________________ 2. _____________________________________ 3. _____________________________________ 4. _____________________________________ I understand that if I am taking daily medications for high blood pressure, I can take them the morning of surgery with a small sip of water. I will consult my prescribing physician or call [facility] with specific questions. I also understand that: ? I am to report important observations or changes that may occur prior to surgery. If I have any changes in my physical condition, such as a rash, a fever, sore throat, abscess, ulcers, nausea, vomiting, or diarrhea. I am to call the office and I am to consult my primary care physician to assess and treat the problem. ? I am not to eat or drink anything after midnight the night before my surgery. ? I am not to drink alcoholic beverages 24 hours prior to surgery. ? I am not to do any illegal drugs prior to surgery. ? I am not to smoke at least 24 hours prior to surgery. ? I am able and will shower or bathe before surgery. I will use the Hibiclens solution on my surgical site only. The hibiclens directions are one packet a day starting two days before surgery. ? I will remove any nail polish, make-up or jewelry prior to arriving for my surgery. ?  If I wear glasses, contact lenses or dentures they must be removed prior to going to the operating room. ? All body piercing and artifical eye-lashes must be removed prior to surgery ? I will not wear any aerosol sprays, perfumes or skin creams. ? I am to make arrangements for a family member or friend to accompany me to surgery and take me home after my surgery as I will not be allowed to leave the hospital alone. A cab or bus will not be acceptable. Please make arrange for someone to stay with you for 24 hours after surgery. ? Patient has expressed understanding of the diagnosis, treatment and planned surgery Surgery: What to Expect at [facility] Your Recovery This care sheet gives you a general idea about how long it will take for you to recover from your surgery. But each person recovers at a different pace. How can you care for yourself at home? Activity · Allow your body to heal. Don't move quickly or lift anything heavy until you are feeling better. · Rest when you feel tired. · Your doctor may give you specific instructions on when you can do your normal activities again, such as driving and going back to work. · Be active. Walking is a good choice. Diet · You can eat your normal diet when you feel well. If your stomach is upset, try bland, low-fat foods like plain rice, broiled chicken, toast, and yogurt. · If your bowel movements are not regular right after surgery, try to avoid constipation and straining. Drink plenty of water. Your doctor may suggest fiber, a stool softener, or a mild laxative. Medicines · Your doctor will tell you if and when you can restart your medicines. He or she will also give you instructions about taking any new medicines. · If you take blood thinners, such as warfarin (Coumadin), clopidogrel (Plavix), or aspirin, be sure to talk to your doctor. He or she will tell you if and when to start taking those medicines again. Make sure that you understand exactly what your doctor wants you to do. · Be safe with medicines. Read and follow all instructions on the label. ¨ If the doctor gave you a prescription medicine for pain, take it as prescribed. ¨ If you are not taking a prescription pain medicine, ask your doctor if you can take an over-the-counter medicine. Incision care · You will have a dressing over the cut (incision). A dressing helps the incision heal and protects it. Your doctor will tell you how to take care of this. · If you have strips of tape on the cut the doctor made, leave the tape on for a week or until it falls off. · If you had stitches, your doctor will tell you when to come back to have them removed. · If you have skin adhesive on the cut, leave it on until it falls off. Skin adhesive is also called liquid stitches. · Change the bandage every day. · Wash the area daily with warm, soapy water, and pat it dry. Don't use hydrogen peroxide or alcohol. They can slow healing. · You may cover the area with a gauze bandage if it oozes fluid or rubs against clothing. · You may shower 24 to 48 hours after surgery. Pat the incision dry. Don't swim or take a bath for the first 2 weeks, or until your doctor tells you it is okay. Follow-up care is a key part of your treatment and safety. Be sure to make and go to all appointments, and call your doctor if you are having problems. It's also a good idea to know your test results and keep a list of the medicines you take. When should you call for help? Call 911 anytime you think you may need emergency care. For example, call if: 
· You passed out (lost consciousness). · You have severe trouble breathing. · You have sudden chest pain and shortness of breath, or you cough up blood. Call your doctor now or seek immediate medical care if: 
· You have pain that does not get better after you take pain medicine. · You have loose stitches, or your incision comes open. · You are bleeding through your dressing.  A small amount of blood is normal. 
· You have signs of infection, such as: 
¨ Increased pain, swelling, warmth, or redness. ¨ Red streaks leading from the incision. ¨ Pus draining from the incision. ¨ A fever. · You have symptoms of a blood clot in your arm or leg (called a deep vein thrombosis). These may include: 
¨ Pain in your calf, back of the knee, thigh, or groin. ¨ Redness and swelling in the arm, leg, or groin. Watch closely for any changes in your health, and be sure to contact your doctor if: 
· You do not have a bowel movement after taking a laxative. Where can you learn more? Go to http://amintaRFIDeasmely.info/ Enter P091 in the search box to learn more about \"Surgery: What to Expect at Home. \" 
© 2544-8821 [employer], [employer]. Care instructions adapted under license by [employer] (which disclaims liability or warranty for this information). This care instruction is for use with your licensed healthcare professional. If you have questions about a medical condition or this instruction, always ask your healthcare professional. [place] [address] any warranty or liability for your use of this information. Content Version: 14.3.963230; Current as of: November 20, 2015 Acute Pain After Surgery: Care Instructions Your Care Instructions It's common to have some pain after surgery. Pain doesn't mean that something is wrong or that the surgery didn't go well. But when the pain is severe, it's important to work with your doctor to manage it. It's also important to be aware of a few facts about pain and pain medicine. · You are the only person who knows what your pain feels like. So be sure to tell your doctor when you are in pain or when the pain changes. Then he or she will know how to adjust your medicines. · Pain is often easier to control right after it starts. So it may be better to take regular doses of pain medicine and not wait until the pain gets bad. · Medicine can help control pain. But this doesn't mean you'll have no pain. Medicine works to keep the pain at a level you can live with. With time, you will feel better. Follow-up care is a key part of your treatment and safety. Be sure to make and go to all appointments, and call your doctor if you are having problems. It's also a good idea to know your test results and keep a list of the medicines you take. How can you care for yourself at home? · Be safe with medicines. Read and follow all instructions on the label. ¨ If the doctor gave you a prescription medicine for pain, take it as prescribed. ¨ If you are not taking a prescription pain medicine, ask your doctor if you can take an over-the-counter medicine. · If you take an over-the-counter pain medicine, such as acetaminophen (Tylenol), ibuprofen (Advil, Motrin), or naproxen (Aleve), read and follow all instructions on the label. · Do not take two or more pain medicines at the same time unless the doctor told you to. · Do not drink alcohol while you are taking pain medicines. · Try to walk each day if your doctor recommends it. Start by walking a little more than you did the day before. Bit by bit, increase the amount you walk. Walking increases blood flow. It also helps prevent pneumonia and constipation. · To prevent constipation from opioid pain medicines: ¨ Talk to your doctor about a laxative. ¨ Include fruits, vegetables, beans, and whole grains in your diet each day. These foods are high in fiber. ¨ Drink plenty of fluids, enough so that your urine is light yellow or clear like water. Drink water, fruit juice, or other drinks that do not contain caffeine or alcohol. If you have kidney, heart, or liver disease and have to limit fluids, talk with your doctor before you increase the amount of fluids you drink.  
¨ Take a fiber supplement, such as Citrucel or Metamucil, every day if needed. Read and follow all instructions on the label. If you take pain medicine for more than a few days, talk to your doctor before you take fiber. When should you call for help? Call your doctor now or seek immediate medical care if: 
 · Your pain gets worse. · Your pain is not controlled by medicine. Watch closely for changes in your health, and be sure to contact your doctor if you have any problems. Where can you learn more? Go to http://aminta-mely.info/. Enter (84) 233-256 in the search box to learn more about \"Acute Pain After Surgery: Care Instructions. \" Current as of: March 20, 2017 Content Version: 11.4 © 2522-4777 [employer]. Care instructions adapted under license by [employer] (which disclaims liability or warranty for this information). If you have questions about a medical condition or this instruction, always ask your healthcare professional. [address] any warranty or liability for your use of this information.

## 2020-01-21 NOTE — PROGRESS NOTES
FOOT AND ANKLE HISTORY AND PHYSICAL      Patient: Yeni Valdez                   MRN: [id number]         SSN: [id number]  YOB: 1965                        AGE: 47 y. o. SEX: female    Patient scheduled for:  Excisional debridement and repair of left Achilles' tendon, excision of calcific bone spur, retrocalcaneal bursectomy, calcaneal ostectomy, possible Matilde gastroc procedure, possible fexor hallucis longus tendon augmentation. Date of surgery: 1/24/20   Location of Surgery: [facility]  Surgeon: Ayesha Sood MD  ANESTHESIA TYPE:  General,  Popliteal block          PRESCRIPTIONS AND/OR ORDERS PROVIDED DURING H&P:    Orders Placed This Encounter    AMB SUPPLY ORDER     Order dated: 1/21/2020  11:30 AM    Knee roller or knee crutch  Crutches,  Wheelchair,  Toilet Riser,  Shower Chair,  CURAD CAST COVER    meperidine (DEMEROL) 50 mg tablet     Sig: Take 0.5-1 Tabs by mouth every six (6) hours as needed for Pain for up to 7 days. Max Daily Amount: 200 mg. Dispense:  28 Tab     Refill:  0    promethazine (PHENERGAN) 25 mg tablet     Sig: Take 1 Tab by mouth every six (6) hours as needed for Nausea. Dispense:  30 Tab     Refill:  0    rivaroxaban (XARELTO) 10 mg tablet     Sig: TAKE ONE TABLET BY MOUTH PER DAY FOLLOWING SURGERY  Indications: Treatment to Prevent Recurrence of a Clot in a Deep Vein     Dispense:  30 Tab     Refill:  0    polyethylene glycol (MIRALAX) 17 gram packet     Sig: Take 1 Packet by mouth daily. Dispense:  10 Packet     Refill:  1    nitrofurantoin, macrocrystal-monohydrate, (MACROBID) 100 mg capsule     Sig: Take 1 Cap by mouth two (2) times a day for 7 days.      Dispense:  14 Cap     Refill:  0                    Prescriptions have been e-scribed to patients pharmacy during the H&P           HISTORY:     The patient was seen in the office today for a preoperative history and physical for an upcoming above listed surgery. The patient is a pleasant 47 y.o. female who has a history of severe pain to the left, noninsertional portion of the Achilles tendon and at the insertion point of the left Achilles tendon. Maria Fernanda Wisdom had MRI of the left Achilles tendon. MRI images were previously reviewed and discussed with the patient by Dr. J Luis Serrano. She continues to wear the CAM walker boot at work and the AS/AC brace on her day off. She state that her pain is severe without the boot. She continues stretches and soaks which makes her foot feel better. She takes Mobic and Gabapentin. She has exhausted conservative treatment and is going to require surgery, for excisional debridement of the left Achilles tendon, possible FHL tendon augmentation. Due to the current findings, affected activity of daily living and continued pain and discomfort, surgical intervention is indicated. The alternatives, risks, and complications, including but not limited to infection, blood loss, need for blood transfusion, neurovascular damage, bridgette-incisional numbness, subcutaneous hematoma, bone fracture, anesthetic complications, DVT, PE, death, RSD, postoperative stiffness and pain, possible surgical scar, delayed healing and nonhealing, reflexive sympathetic dystrophy, damage to blood vessels and nerves, need for more surgery, MI, and stroke, failure of hardware, gait disturbances  have been discussed. The patient understands and wishes to proceed with surgery. The patient has h/o breast cancer, DM and HTN. She is currently taking gabapentin due to neuropathic pain from a surgical scar over the right breast. She is on Metformin for her DM. PAST MEDICAL HISTORY:     Past Medical History:   Diagnosis Date    [facility]) 2013    right breast    Breast cancer ([facility] [address]     Diabetes ([facility]     Hypercholesterolemia     Hypertension     Insomnia     Obesity (BMI 30.0-34. 9)     UTI (urinary tract infection)        CURRENT MEDICATIONS:     Current Outpatient Medications   Medication Sig Dispense Refill    losartan (COZAAR) 50 mg tablet Take 1 Tab by mouth daily. 90 Tab 1    furosemide (LASIX) 20 mg tablet Take 1 Tab by mouth daily as needed for Other (severe swelling). 90 Tab 1    metFORMIN (GLUCOPHAGE) 500 mg tablet Take 1 Tab by mouth two (2) times a day. 180 Tab 1    simvastatin (ZOCOR) 40 mg tablet Take 1 Tab by mouth nightly. 90 Tab 1    venlafaxine (EFFEXOR) 37.5 mg tablet Take 1 Tab by mouth daily. 90 Tab 1    [START ON 3/21/2020] zolpidem (AMBIEN) 10 mg tablet Take 1 Tab by mouth nightly as needed for Sleep. Max Daily Amount: 10 mg. 30 Tab 0    [START ON 3/21/2020] gabapentin (NEURONTIN) 100 mg capsule Take 1 Cap by mouth three (3) times daily. Max Daily Amount: 300 mg. 90 Cap 0    meperidine (DEMEROL) 50 mg tablet Take 0.5-1 Tabs by mouth every six (6) hours as needed for Pain for up to 7 days. Max Daily Amount: 200 mg. 28 Tab 0    promethazine (PHENERGAN) 25 mg tablet Take 1 Tab by mouth every six (6) hours as needed for Nausea. 30 Tab 0    rivaroxaban (XARELTO) 10 mg tablet TAKE ONE TABLET BY MOUTH PER DAY FOLLOWING SURGERY  Indications: Treatment to Prevent Recurrence of a Clot in a Deep Vein 30 Tab 0    polyethylene glycol (MIRALAX) 17 gram packet Take 1 Packet by mouth daily. 10 Packet 1    nitrofurantoin, macrocrystal-monohydrate, (MACROBID) 100 mg capsule Take 1 Cap by mouth two (2) times a day for 7 days.  14 Cap 0       ALLERGIES:     Allergies   Allergen Reactions    Aspirin Swelling    Codeine Swelling    Penicillins Swelling         SURGICAL HISTORY:     Past Surgical History:   Procedure Laterality Date    HX CARPAL TUNNEL RELEASE Bilateral     HX  SECTION      HX CHOLECYSTECTOMY      HX KNEE ARTHROSCOPY Bilateral     HX MASTECTOMY Right 2013    HX TUBAL LIGATION         SOCIAL HISTORY:     Social History     Socioeconomic History    Marital status:      Spouse name: Not on file    Number of children: Not on file    Years of education: Not on file    Highest education level: Not on file   Tobacco Use    Smoking status: Current Every Day Smoker    Smokeless tobacco: Never Used   Substance and Sexual Activity    Alcohol use: Yes     Frequency: Never     Comment: occassionl    Drug use: No    Sexual activity: Not Currently     Partners: Male       FAMILY HISTORY:     Family History   Problem Relation Age of Onset   [facility] Mother     COPD Mother     Alcohol abuse Father     Diabetes Father     Hypertension Father     Heart Disease Father     Elevated Lipids Father     Hypertension Brother     Hypertension Maternal Grandmother     Hypertension Maternal Grandfather        REVIEW OF SYSTEMS:     Negative for fevers, chills, chest pain, shortness of breath, weight loss, recent illness     General: Negative for fever and chills. No unexpected change in weight. Denies fatigue. No change in appetite. Skin: Negative for rash or itching. HEENT: Negative for congestion, sore throat, neck pain and neck stiffness. No change in vision or hearing. Hasn't noted any enlarged lymph nodes in the neck. Cardiovascular:  Negative for chest pain and palpitations. Has not noted pedal edema. Respiratory: Negative for cough, colds, sinus, hemoptysis, shortness of breath and wheezing. Gastrointestinal: Negative for nausea and vomiting, rectal bleeding, coffee ground emesis, abdominal pain, diarrhea and constipation. Genitourinary: Negative for dysuria, burning on micturition. No flank pain, no foul smelling urine, no difficulty with initiating urination. (+) urgency and frequency  Hematological: Negative for bleeding or easy bruising. Musculoskeletal: Negative for arthralgias, back pain or neck pain. Neurological: Negative for dizziness, seizures or syncopal episodes. Denies headaches. Endocrine: Denies excessive thirst.  No heat/cold intolerance.   Psychiatric: Negative for depression or insomnia. PHYSICAL EXAMINATION:     VITALS:   Visit Vitals  BP (!) 167/99   Pulse 69   Resp 12   Ht 5' 6\" (1.676 m)   Wt 212 lb (96.2 kg)   SpO2 100%   BMI 34.22 kg/m²       Pain Assessment  1/21/2020   Location of Pain Ankle   Location Modifiers Left   Severity of Pain 0   Quality of Pain -   Duration of Pain -   Frequency of Pain -   Aggravating Factors -   Limiting Behavior -   Relieving Factors -   Result of Injury -        Pain Location: Ankle      GEN:  Well developed, well nourished 47 y.o. female in no acute distress. PSYCH: Alert an oriented to person, place and time. Mood, memory, affect, behavior and judgment normal. Speech normal in context and clarity. HEENT: Normocephalic and atraumatic. Eyes: Conjunctivae and EOM are normal.Pupils are equal, round, and reactive to light. External ear normal appearance, external nose normal appearing. Mouth/Throat: Oropharynx is clear and moist, able to handle oral secretions w/out difficulty, airway patent. NECK: Supple. Normal ROM, No lymphadenopathy. Trachea is midline. No bruising, swelling or deformity  RESP: Clear to auscultation bilaterally. No audible wheezing from mouth. No rales, rhonchi. Normal effort and breath sounds. No respiratory use of accessory muscles during breathing or distress  CHEST/ABDOMEN: Observation reveals: No audible wheezing from mouth. No accessory use of chest muscles during breathing. Non tender abdomen  CARDIO:  Normal rate, regular rhythm and normal heart sounds. No MGR. ABDOMEN: Non-tender, non-distended, normoactive bowel sounds in all four quadrants. There is no tenderness. There is no rebound and no guarding. BACK: No CVA or spinal tenderness  BREAST:  Deferred  PELVIC:    Deferred   RECTAL:  Deferred   :           Deferred  EXTREMITIES: EXAMINATION OF:   ANKLE/FOOT left    Gait: in CAM walker boot  Tenderness: moderate tender achilles tendon without deformity palpable:  Swelling:   Moderate fusiform swelling at the noninsertional point of her achilies tendon. No warmth. Calf tenderness: Absent for calf or gastrocnemius muscle regions   Soft, supple, non tender, non taut lower extremity compartments   NONE to Medial Malleolar, 4/5 Met base midfoot, achilles, tib post, or   NONE to syndesmosis. no to plantar fascia, or central calcaneal region  Cutaneous: WNL  Joint Motion: WNL   Joint / Tendon Stability: No Ankle or Subtalar instability or joint laxity. No peroneal sublux ability or dislocation  Alignment: neutral Hindfoot, none Metatarsus Adductus Metatarsus. Neuro Motor/Sensory: NL/NL  Vascular: NL foot/ankle pulses  Lymphatics: No extremity lymphedema, No calf swelling, no tenderness to calf muscles. RADIOGRAPHS & DIAGNOSTIC STUDIES:       MRI Results (most recent):  Results from Hospital Encounter encounter on 10/19/19   MRI ANKLE LT WO CONT    Narrative EXAMINATION: MRI left ankle without contrast    INDICATION: Achilles tendinitis, left ankle pain    COMPARISON: None    TECHNIQUE: Multiplanar multiecho MRI sequences of the left ankle performed  without contrast.    FINDINGS:    Bones/joints: Chronic appearing ossific fragment below the medial malleolus. Small Achilles enthesophyte. Large plantar calcaneal spur. No acute fracture. Multifocal mild TMT degenerative changes. Ligaments:  -Calcaneofibular ligament intact. Anterior talofibular ligament poorly defined. Posterior talofibular ligament intact. Anterior and posterior tibiofibular  ligaments appear intact. -Deltoid ligament the fibers with intrasubstance signal changes and  irregularity. Spring ligament intact. Lisfranc ligament intact. Tendons:  -Distal Achilles tendinopathy characterized by thickening, intrasubstance  signal, and low-grade partial-thickness deep surface and interstitial tearing at  the insertion. Possible small avulsion fracture fragment lying anterior to the  distal Achilles.  Total length of abnormal tendon is roughly 7.4 cm.  -Peroneal tendons intact.  -Flexor tendons intact. -Extensor tendons intact. Miscellaneous: Minimal edema in the sinus tarsi with fat signal largely  preserved. Central band plantar fascia thickening. Small subtalar joint  effusion. Impression IMPRESSION:    Partial thickness tear of the distal Achilles tendon with underlying  tendinopathy as detailed above. -Possible small avulsion fracture fragment lying anterior to the distal Achilles  tendon just above insertion. Recommend correlation with radiographs. Age-indeterminate anterior talofibular ligament injury, probably nonacute. Chronic appearing central band plantar fasciitis. See additional details above. LABS:     @  CBC:   Lab Results   Component Value Date/Time    WBC 9.7 01/14/2020 10:40 AM    RBC 5.52 (H) 01/14/2020 10:40 AM    HGB 17.2 (H) 01/14/2020 10:40 AM    HCT 51.0 (H) 01/14/2020 10:40 AM    PLATELET 296 76/81/1006 10:40 AM   , CMP:   Lab Results   Component Value Date/Time    Glucose 148 (H) 01/14/2020 10:40 AM    Sodium 143 01/14/2020 10:40 AM    Potassium 4.3 01/14/2020 10:40 AM    Chloride 106 01/14/2020 10:40 AM    CO2 32 01/14/2020 10:40 AM    BUN 8 01/14/2020 10:40 AM    Creatinine 0.78 01/14/2020 10:40 AM    Calcium 10.1 01/14/2020 10:40 AM    Anion gap 5 01/14/2020 10:40 AM    BUN/Creatinine ratio 10 (L) 01/14/2020 10:40 AM    Alk.  phosphatase 89 01/14/2020 10:40 AM    Protein, total 7.1 01/14/2020 10:40 AM    Albumin 4.0 01/14/2020 10:40 AM    Globulin 3.1 01/14/2020 10:40 AM    A-G Ratio 1.3 01/14/2020 10:40 AM    and Coagulation:   Lab Results   Component Value Date/Time    aPTT 27.7 01/14/2020 10:40 AM     Lab Results   Component Value Date/Time    Hemoglobin A1c 7.3 (H) 01/14/2020 10:40 AM          1/14/2020  1:46 PM - Jacob, Lab In [facility]     Component Value Flag Ref Range Units Status   Color YELLOW      Final   Appearance CLEAR      Final   Specific gravity 1.014   1.005 - 1.030   Final   pH (UA) 5.0   5.0 - 8.0   Final   Protein 30  Abnormal   NEG mg/dL Final   Glucose NEGATIVE    NEG mg/dL Final   Ketone NEGATIVE    NEG mg/dL Final   Bilirubin NEGATIVE    NEG   Final   Blood NEGATIVE    NEG   Final   Urobilinogen 0.2   0.2 - 1.0 EU/dL Final   Nitrites NEGATIVE    NEG   Final   Leukocyte Esterase NEGATIVE    NEG   Final     Few bacteria    Preoperative labs were reviewed and are substantially within normal limits   Chest X-ray revealed no acute cardiopulmonary process     XR Results (most recent):  Results from Hospital Encounter encounter on 01/11/20   XR CHEST PA LAT    Narrative EXAM: XR CHEST PA LAT    INDICATION: pre op    COMPARISON: None. FINDINGS: PA and lateral radiographs of the chest demonstrate clear lungs. The  cardiac and mediastinal contours and pulmonary vascularity are normal. Osseous  degenerative changes. Clips overlie the right upper quadrant. Impression IMPRESSION: No acute findings       EKG: is abnormal and pending Anesthesia review    EKG RESULTS     Procedure Component Value Units Date/Time    EKG, 12 LEAD, INITIAL [[id number]] Collected:  01/14/20 1214    Order Status:  Completed Updated:  01/14/20 1411     Ventricular Rate 73 BPM      Atrial Rate 73 BPM      P-R Interval 144 ms      QRS Duration 96 ms      Q-T Interval 434 ms      QTC Calculation (Bezet) 478 ms      Calculated P Axis 60 degrees      Calculated R Axis -18 degrees      Calculated T Axis 25 degrees      Diagnosis --     Normal sinus rhythm  Possible Left atrial enlargement  Nonspecific ST abnormality  Abnormal ECG  No previous ECGs available  Confirmed by Sara Lynn MD, Mike Headings ([id number]) on 1/14/2020 2:11:36 PM              ASSESSMENT:     Encounter Diagnoses   Name Primary?     Achilles tendinitis of left lower extremity Yes    Pre-operative examination     Acute right ankle pain     Diabetes mellitus due to underlying condition with hyperosmolarity without coma, without long-term current use of insulin (HCC)     Severe obesity ([facility] [place] [address]        PLAN:     Again, the alternatives, risks, and complications, as well as expected outcome were discussed. The patient understands and agrees to proceed with the above listed surgery pending EKG review. Rx provided for Macrobid for pending UTI. Patient has been given Hibiclens wash with instructions and/or prescriptions or orders listed above.     Zoie Carrillo PA-C  1/21/2020  9:21 AM

## 2020-01-21 NOTE — PROGRESS NOTES
1. Have you been to the ER, urgent care clinic since your last visit? Hospitalized since your last visit? No    2. Have you seen or consulted any other health care providers outside of the [address] [place] [address] since your last visit? Include any pap smears or colon screening.  No

## 2020-01-21 NOTE — H&P (VIEW-ONLY)
FOOT AND ANKLE HISTORY AND PHYSICAL Patient: Jake Nuñez                   MRN: [id number]         SSN: [id number] YOB: 1965                        AGE: 47 y. o. SEX: female Patient scheduled for:  Excisional debridement and repair of left Achilles' tendon, excision of calcific bone spur, retrocalcaneal bursectomy, calcaneal ostectomy, possible Matilde gastroc procedure, possible fexor hallucis longus tendon augmentation. Date of surgery: 1/24/20 Location of Surgery: DR. [facility] Surgeon: Shayla Ma. MD [place] 
ANESTHESIA TYPE:  General,  Popliteal block PRESCRIPTIONS AND/OR ORDERS PROVIDED DURING H&P: 
 
Orders Placed This Encounter  AMB SUPPLY ORDER Order dated: 1/21/2020 
11:30 AM 
 
Knee roller or knee crutch 
Crutches, Wheelchair, Toilet Riser, Shower Chair, CURAD CAST COVER  
 meperidine (DEMEROL) 50 mg tablet Sig: Take 0.5-1 Tabs by mouth every six (6) hours as needed for Pain for up to 7 days. Max Daily Amount: 200 mg. Dispense:  28 Tab Refill:  0  
 promethazine (PHENERGAN) 25 mg tablet Sig: Take 1 Tab by mouth every six (6) hours as needed for Nausea. Dispense:  30 Tab Refill:  0  
 rivaroxaban (XARELTO) 10 mg tablet Sig: TAKE ONE TABLET BY MOUTH PER DAY FOLLOWING SURGERY  Indications: Treatment to Prevent Recurrence of a Clot in a Deep Vein Dispense:  30 Tab Refill:  0  
 polyethylene glycol (MIRALAX) 17 gram packet Sig: Take 1 Packet by mouth daily. Dispense:  10 Packet Refill:  1  
 nitrofurantoin, macrocrystal-monohydrate, (MACROBID) 100 mg capsule Sig: Take 1 Cap by mouth two (2) times a day for 7 days. Dispense:  14 Cap Refill:  0 Prescriptions have been e-scribed to patients pharmacy during the H&P HISTORY:  
 
The patient was seen in the office today for a preoperative history and physical for an upcoming above listed surgery. The patient is a pleasant 47 y.o. female who has a history of severe pain to the left, noninsertional portion of the Achilles tendon and at the insertion point of the left Achilles tendon. Batsheva Martinez had MRI of the left Achilles tendon. MRI images were previously reviewed and discussed with the patient by Dr. Lor Norman. She continues to wear the CAM walker boot at work and the AS/AC brace on her day off. She state that her pain is severe without the boot. She continues stretches and soaks which makes her foot feel better. She takes Mobic and Gabapentin. She has exhausted conservative treatment and is going to require surgery, for excisional debridement of the left Achilles tendon, possible FHL tendon augmentation. Due to the current findings, affected activity of daily living and continued pain and discomfort, surgical intervention is indicated. The alternatives, risks, and complications, including but not limited to infection, blood loss, need for blood transfusion, neurovascular damage, bridgette-incisional numbness, subcutaneous hematoma, bone fracture, anesthetic complications, DVT, PE, death, RSD, postoperative stiffness and pain, possible surgical scar, delayed healing and nonhealing, reflexive sympathetic dystrophy, damage to blood vessels and nerves, need for more surgery, MI, and stroke, failure of hardware, gait disturbances  have been discussed. The patient understands and wishes to proceed with surgery. The patient has h/o breast cancer, DM and HTN. She is currently taking gabapentin due to neuropathic pain from a surgical scar over the right breast. She is on Metformin for her DM. PAST MEDICAL HISTORY:  
 
Past Medical History:  
Diagnosis Date  [facility] ([facility] 2013  
 right breast  
 Diabetes ([facility]  Hypercholesterolemia  Hypertension  Insomnia CURRENT MEDICATIONS:  
 
Current Outpatient Medications Medication Sig Dispense Refill  losartan (COZAAR) 50 mg tablet Take 1 Tab by mouth daily. 90 Tab 1  
 furosemide (LASIX) 20 mg tablet Take 1 Tab by mouth daily as needed for Other (severe swelling). 90 Tab 1  
 metFORMIN (GLUCOPHAGE) 500 mg tablet Take 1 Tab by mouth two (2) times a day. 180 Tab 1  
 simvastatin (ZOCOR) 40 mg tablet Take 1 Tab by mouth nightly. 90 Tab 1  venlafaxine (EFFEXOR) 37.5 mg tablet Take 1 Tab by mouth daily. 90 Tab 1  [START ON 3/21/2020] zolpidem (AMBIEN) 10 mg tablet Take 1 Tab by mouth nightly as needed for Sleep. Max Daily Amount: 10 mg. 30 Tab 0  
 [START ON 3/21/2020] gabapentin (NEURONTIN) 100 mg capsule Take 1 Cap by mouth three (3) times daily. Max Daily Amount: 300 mg. 90 Cap 0  
 meperidine (DEMEROL) 50 mg tablet Take 0.5-1 Tabs by mouth every six (6) hours as needed for Pain for up to 7 days. Max Daily Amount: 200 mg. 28 Tab 0  promethazine (PHENERGAN) 25 mg tablet Take 1 Tab by mouth every six (6) hours as needed for Nausea. 30 Tab 0  
 rivaroxaban (XARELTO) 10 mg tablet TAKE ONE TABLET BY MOUTH PER DAY FOLLOWING SURGERY  Indications: Treatment to Prevent Recurrence of a Clot in a Deep Vein 30 Tab 0  
 polyethylene glycol (MIRALAX) 17 gram packet Take 1 Packet by mouth daily. 10 Packet 1  
 nitrofurantoin, macrocrystal-monohydrate, (MACROBID) 100 mg capsule Take 1 Cap by mouth two (2) times a day for 7 days. 14 Cap 0 ALLERGIES:  
 
Allergies Allergen Reactions  Aspirin Swelling  Codeine Swelling  Penicillins Swelling SURGICAL HISTORY:  
 
Past Surgical History:  
Procedure Laterality Date  HX CARPAL TUNNEL RELEASE Bilateral   
 HX  SECTION    
 HX CHOLECYSTECTOMY  HX KNEE ARTHROSCOPY Bilateral   
 HX MASTECTOMY Right 2013  HX TUBAL LIGATION    
 
 
SOCIAL HISTORY:  
 
Social History Socioeconomic History  Marital status:  Spouse name: Not on file  Number of children: Not on file  Years of education: Not on file  Highest education level: Not on file Tobacco Use  Smoking status: Current Every Day Smoker  Smokeless tobacco: Never Used Substance and Sexual Activity  Alcohol use: Yes Frequency: Never Comment: occassionl  Drug use: No  
 Sexual activity: Not Currently Partners: Male FAMILY HISTORY:  
 
Family History Problem Relation Age of Onset  Cancer Mother  COPD Mother  Alcohol abuse Father  Diabetes Father  Hypertension Father  Heart Disease Father  Elevated Lipids Father  Hypertension Brother  Hypertension Maternal Grandmother  Hypertension Maternal Grandfather REVIEW OF SYSTEMS:  
 
Negative for fevers, chills, chest pain, shortness of breath, weight loss, recent illness General: Negative for fever and chills. No unexpected change in weight. Denies fatigue. No change in appetite. Skin: Negative for rash or itching. HEENT: Negative for congestion, sore throat, neck pain and neck stiffness. No change in vision or hearing. Hasn't noted any enlarged lymph nodes in the neck. Cardiovascular:  Negative for chest pain and palpitations. Has not noted pedal edema. Respiratory: Negative for cough, colds, sinus, hemoptysis, shortness of breath and wheezing. Gastrointestinal: Negative for nausea and vomiting, rectal bleeding, coffee ground emesis, abdominal pain, diarrhea and constipation. Genitourinary: Negative for dysuria, burning on micturition. No flank pain, no foul smelling urine, no difficulty with initiating urination. (+) urgency and frequency Hematological: Negative for bleeding or easy bruising. Musculoskeletal: Negative for arthralgias, back pain or neck pain. Neurological: Negative for dizziness, seizures or syncopal episodes. Denies headaches. Endocrine: Denies excessive thirst.  No heat/cold intolerance. Psychiatric: Negative for depression or insomnia.  
 
PHYSICAL EXAMINATION:  
 VITALS:  
Visit Vitals BP (!) 167/99 Pulse 69 Resp 12 Ht 5' 6\" (1.676 m) Wt 212 lb (96.2 kg) SpO2 100% BMI 34.22 kg/m² Pain Assessment  1/21/2020 Location of Pain Ankle Location Modifiers Left Severity of Pain 0 Quality of Pain - Duration of Pain - Frequency of Pain - Aggravating Factors - Limiting Behavior -  
Relieving Factors - Result of Injury -  
 
  
Pain Location: Ankle GEN:  Well developed, well nourished 47 y.o. female in no acute distress. PSYCH: Alert an oriented to person, place and time. Mood, memory, affect, behavior and judgment normal. Speech normal in context and clarity. HEENT: Normocephalic and atraumatic. Eyes: Conjunctivae and EOM are normal.Pupils are equal, round, and reactive to light. External ear normal appearance, external nose normal appearing. Mouth/Throat: Oropharynx is clear and moist, able to handle oral secretions w/out difficulty, airway patent. NECK: Supple. Normal ROM, No lymphadenopathy. Trachea is midline. No bruising, swelling or deformity RESP: Clear to auscultation bilaterally. No audible wheezing from mouth. No rales, rhonchi. Normal effort and breath sounds. No respiratory use of accessory muscles during breathing or distress CHEST/ABDOMEN: Observation reveals: No audible wheezing from mouth. No accessory use of chest muscles during breathing. Non tender abdomen CARDIO:  Normal rate, regular rhythm and normal heart sounds. No MGR. ABDOMEN: Non-tender, non-distended, normoactive bowel sounds in all four quadrants. There is no tenderness. There is no rebound and no guarding. BACK: No CVA or spinal tenderness BREAST:  Deferred PELVIC:    Deferred RECTAL:  Deferred :           Deferred EXTREMITIES: EXAMINATION OF:  
ANKLE/FOOT left Gait: in CAM walker boot Tenderness: moderate tender achilles tendon without deformity palpable: 
Swelling:   Moderate fusiform swelling at the noninsertional point of her achilies tendon. No warmth. Calf tenderness: Absent for calf or gastrocnemius muscle regions Soft, supple, non tender, non taut lower extremity compartments NONE to Medial Malleolar, 4/5 Met base midfoot, achilles, tib post, or 
 NONE to syndesmosis. no to plantar fascia, or central calcaneal region Cutaneous: WNL Joint Motion: WNL Joint / Tendon Stability: No Ankle or Subtalar instability or joint laxity. No peroneal sublux ability or dislocation Alignment: neutral Hindfoot, none Metatarsus Adductus Metatarsus. Neuro Motor/Sensory: NL/NL Vascular: NL foot/ankle pulses Lymphatics: No extremity lymphedema, No calf swelling, no tenderness to calf muscles. RADIOGRAPHS & DIAGNOSTIC STUDIES:  
 
 
MRI Results (most recent): 
Results from Hospital Encounter encounter on 10/19/19 MRI ANKLE LT WO CONT Narrative EXAMINATION: MRI left ankle without contrast 
 
INDICATION: Achilles tendinitis, left ankle pain COMPARISON: None TECHNIQUE: Multiplanar multiecho MRI sequences of the left ankle performed 
without contrast. 
 
FINDINGS: 
 
Bones/joints: Chronic appearing ossific fragment below the medial malleolus. Small Achilles enthesophyte. Large plantar calcaneal spur. No acute fracture. Multifocal mild TMT degenerative changes. Ligaments: 
-Calcaneofibular ligament intact. Anterior talofibular ligament poorly defined. Posterior talofibular ligament intact. Anterior and posterior tibiofibular 
ligaments appear intact. -Deltoid ligament the fibers with intrasubstance signal changes and 
irregularity. Spring ligament intact. Lisfranc ligament intact. Tendons: 
-Distal Achilles tendinopathy characterized by thickening, intrasubstance 
signal, and low-grade partial-thickness deep surface and interstitial tearing at 
the insertion. Possible small avulsion fracture fragment lying anterior to the 
distal Achilles. Total length of abnormal tendon is roughly 7.4 cm. -Peroneal tendons intact. 
-Flexor tendons intact. -Extensor tendons intact. Miscellaneous: Minimal edema in the sinus tarsi with fat signal largely 
preserved. Central band plantar fascia thickening. Small subtalar joint 
effusion. Impression IMPRESSION: 
 
Partial thickness tear of the distal Achilles tendon with underlying 
tendinopathy as detailed above. -Possible small avulsion fracture fragment lying anterior to the distal Achilles 
tendon just above insertion. Recommend correlation with radiographs. Age-indeterminate anterior talofibular ligament injury, probably nonacute. Chronic appearing central band plantar fasciitis. See additional details above. LABS:  
 
@ 
CBC:  
Lab Results Component Value Date/Time WBC 9.7 01/14/2020 10:40 AM  
 RBC 5.52 (H) 01/14/2020 10:40 AM  
 HGB 17.2 (H) 01/14/2020 10:40 AM  
 HCT 51.0 (H) 01/14/2020 10:40 AM  
 PLATELET 989 39/01/6535 10:40 AM  
, CMP:  
Lab Results Component Value Date/Time Glucose 148 (H) 01/14/2020 10:40 AM  
 Sodium 143 01/14/2020 10:40 AM  
 Potassium 4.3 01/14/2020 10:40 AM  
 Chloride 106 01/14/2020 10:40 AM  
 CO2 32 01/14/2020 10:40 AM  
 BUN 8 01/14/2020 10:40 AM  
 Creatinine 0.78 01/14/2020 10:40 AM  
 Calcium 10.1 01/14/2020 10:40 AM  
 Anion gap 5 01/14/2020 10:40 AM  
 BUN/Creatinine ratio 10 (L) 01/14/2020 10:40 AM  
 Alk. phosphatase 89 01/14/2020 10:40 AM  
 Protein, total 7.1 01/14/2020 10:40 AM  
 Albumin 4.0 01/14/2020 10:40 AM  
 Globulin 3.1 01/14/2020 10:40 AM  
 A-G Ratio 1.3 01/14/2020 10:40 AM  
 and Coagulation:  
Lab Results Component Value Date/Time  
 aPTT 27.7 01/14/2020 10:40 AM  
 
Lab Results Component Value Date/Time Hemoglobin A1c 7.3 (H) 01/14/2020 10:40 AM  
 
 
  
1/14/2020  1:46 PM - Jacob, Lab In [facility]  
 
Component Value Flag Ref Range Units Status Color YELLOW      Final  
Appearance CLEAR      Final  
Specific gravity 1.014   1.005 - 1.030   Final  
 pH (UA) 5.0   5.0 - 8.0   Final  
Protein 30  Abnormal   NEG mg/dL Final  
Glucose NEGATIVE    NEG mg/dL Final  
Ketone NEGATIVE    NEG mg/dL Final  
Bilirubin NEGATIVE    NEG   Final  
Blood NEGATIVE    NEG   Final  
Urobilinogen 0.2   0.2 - 1.0 EU/dL Final  
Nitrites NEGATIVE    NEG   Final  
Leukocyte Esterase NEGATIVE    NEG   Final  
 
Few bacteria Preoperative labs were reviewed and are substantially within normal limits Chest X-ray revealed no acute cardiopulmonary process XR Results (most recent): 
Results from Hospital Encounter encounter on 01/11/20 XR CHEST PA LAT Narrative EXAM: XR CHEST PA LAT INDICATION: pre op COMPARISON: None. FINDINGS: PA and lateral radiographs of the chest demonstrate clear lungs. The 
cardiac and mediastinal contours and pulmonary vascularity are normal. Osseous 
degenerative changes. Clips overlie the right upper quadrant. Impression IMPRESSION: No acute findings EKG: is abnormal and pending Anesthesia review EKG RESULTS Procedure Component Value Units Date/Time EKG, 12 LEAD, INITIAL [[id number]] Collected:  01/14/20 1214 Order Status:  Completed Updated:  01/14/20 1411 Ventricular Rate 73 BPM   
  Atrial Rate 73 BPM   
  P-R Interval 144 ms QRS Duration 96 ms   
  Q-T Interval 434 ms QTC Calculation (Bezet) 478 ms Calculated P Axis 60 degrees Calculated R Axis -18 degrees Calculated T Axis 25 degrees Diagnosis --  
  Normal sinus rhythm Possible Left atrial enlargement Nonspecific ST abnormality Abnormal ECG No previous ECGs available Confirmed by Dorita Friedman MD, Smita Walcott ([id number]) on 1/14/2020 2:11:36 PM 
  
  
 
 
 
ASSESSMENT:  
 
Encounter Diagnoses Name Primary?  Achilles tendinitis of left lower extremity Yes  Pre-operative examination  Acute right ankle pain PLAN:  
 
Again, the alternatives, risks, and complications, as well as expected outcome were discussed. The patient understands and agrees to proceed with the above listed surgery pending EKG review. Rx provided for Macrobid for pending UTI. Patient has been given Hibiclens wash with instructions and/or prescriptions or orders listed above.  
 
Sergio Madden PA-C 
1/21/2020 
9:21 AM 
 
 
Leopoldo Jacobs, MD 
1/24/2020 
7:03 AM

## 2020-01-21 NOTE — H&P
FOOT AND ANKLE HISTORY AND PHYSICAL      Patient: Mecca Lion                   MRN: [id number]         SSN: [id number]  YOB: 1965                        AGE: 47 y. o. SEX: female    Patient scheduled for:  Excisional debridement and repair of left Achilles' tendon, excision of calcific bone spur, retrocalcaneal bursectomy, calcaneal ostectomy, possible Matilde gastroc procedure, possible fexor hallucis longus tendon augmentation. Date of surgery: 1/24/20   [facility] of Surgery: [facility]  Surgeon: David Cavazos. MD [place]  ANESTHESIA TYPE:  General,  Popliteal block          PRESCRIPTIONS AND/OR ORDERS PROVIDED DURING H&P:    Orders Placed This Encounter    AMB SUPPLY ORDER     Order dated: 1/21/2020  11:30 AM    Knee roller or knee crutch  Crutches,  Wheelchair,  Toilet Riser,  Shower Chair,  CURAD CAST COVER    meperidine (DEMEROL) 50 mg tablet     Sig: Take 0.5-1 Tabs by mouth every six (6) hours as needed for Pain for up to 7 days. Max Daily Amount: 200 mg. Dispense:  28 Tab     Refill:  0    promethazine (PHENERGAN) 25 mg tablet     Sig: Take 1 Tab by mouth every six (6) hours as needed for Nausea. Dispense:  30 Tab     Refill:  0    rivaroxaban (XARELTO) 10 mg tablet     Sig: TAKE ONE TABLET BY MOUTH PER DAY FOLLOWING SURGERY  Indications: Treatment to Prevent Recurrence of a Clot in a Deep Vein     Dispense:  30 Tab     Refill:  0    polyethylene glycol (MIRALAX) 17 gram packet     Sig: Take 1 Packet by mouth daily. Dispense:  10 Packet     Refill:  1    nitrofurantoin, macrocrystal-monohydrate, (MACROBID) 100 mg capsule     Sig: Take 1 Cap by mouth two (2) times a day for 7 days.      Dispense:  14 Cap     Refill:  0                    Prescriptions have been e-scribed to patients pharmacy during the H&P           HISTORY:     The patient was seen in the office today for a preoperative history and physical for an upcoming above listed surgery. The patient is a pleasant 47 y.o. female who has a history of severe pain to the left, noninsertional portion of the Achilles tendon and at the insertion point of the left Achilles tendon. Anna Ribeiro had MRI of the left Achilles tendon. MRI images were previously reviewed and discussed with the patient by Dr. Artemio Nina. She continues to wear the CAM walker boot at work and the AS/AC brace on her day off. She state that her pain is severe without the boot. She continues stretches and soaks which makes her foot feel better. She takes Mobic and Gabapentin. She has exhausted conservative treatment and is going to require surgery, for excisional debridement of the left Achilles tendon, possible FHL tendon augmentation. Due to the current findings, affected activity of daily living and continued pain and discomfort, surgical intervention is indicated. The alternatives, risks, and complications, including but not limited to infection, blood loss, need for blood transfusion, neurovascular damage, bridgette-incisional numbness, subcutaneous hematoma, bone fracture, anesthetic complications, DVT, PE, death, RSD, postoperative stiffness and pain, possible surgical scar, delayed healing and nonhealing, reflexive sympathetic dystrophy, damage to blood vessels and nerves, need for more surgery, MI, and stroke, failure of hardware, gait disturbances  have been discussed. The patient understands and wishes to proceed with surgery. The patient has h/o breast cancer, DM and HTN. She is currently taking gabapentin due to neuropathic pain from a surgical scar over the right breast. She is on Metformin for her DM.       PAST MEDICAL HISTORY:     Past Medical History:   Diagnosis Date    [facility] 2013    right breast    Diabetes ([facility]     Hypercholesterolemia     Hypertension     Insomnia        CURRENT MEDICATIONS:     Current Outpatient Medications   Medication Sig Dispense Refill    losartan (COZAAR) 50 mg tablet Take 1 Tab by mouth daily. 90 Tab 1    furosemide (LASIX) 20 mg tablet Take 1 Tab by mouth daily as needed for Other (severe swelling). 90 Tab 1    metFORMIN (GLUCOPHAGE) 500 mg tablet Take 1 Tab by mouth two (2) times a day. 180 Tab 1    simvastatin (ZOCOR) 40 mg tablet Take 1 Tab by mouth nightly. 90 Tab 1    venlafaxine (EFFEXOR) 37.5 mg tablet Take 1 Tab by mouth daily. 90 Tab 1    [START ON 3/21/2020] zolpidem (AMBIEN) 10 mg tablet Take 1 Tab by mouth nightly as needed for Sleep. Max Daily Amount: 10 mg. 30 Tab 0    [START ON 3/21/2020] gabapentin (NEURONTIN) 100 mg capsule Take 1 Cap by mouth three (3) times daily. Max Daily Amount: 300 mg. 90 Cap 0    meperidine (DEMEROL) 50 mg tablet Take 0.5-1 Tabs by mouth every six (6) hours as needed for Pain for up to 7 days. Max Daily Amount: 200 mg. 28 Tab 0    promethazine (PHENERGAN) 25 mg tablet Take 1 Tab by mouth every six (6) hours as needed for Nausea. 30 Tab 0    rivaroxaban (XARELTO) 10 mg tablet TAKE ONE TABLET BY MOUTH PER DAY FOLLOWING SURGERY  Indications: Treatment to Prevent Recurrence of a Clot in a Deep Vein 30 Tab 0    polyethylene glycol (MIRALAX) 17 gram packet Take 1 Packet by mouth daily. 10 Packet 1    nitrofurantoin, macrocrystal-monohydrate, (MACROBID) 100 mg capsule Take 1 Cap by mouth two (2) times a day for 7 days.  14 Cap 0       ALLERGIES:     Allergies   Allergen Reactions    Aspirin Swelling    Codeine Swelling    Penicillins Swelling         SURGICAL HISTORY:     Past Surgical History:   Procedure Laterality Date    HX CARPAL TUNNEL RELEASE Bilateral     HX  SECTION      HX CHOLECYSTECTOMY      HX KNEE ARTHROSCOPY Bilateral     HX MASTECTOMY Right 2013    HX TUBAL LIGATION         SOCIAL HISTORY:     Social History     Socioeconomic History    Marital status:      Spouse name: Not on file    Number of children: Not on file    Years of education: Not on file    Highest education level: Not on file   Tobacco Use    Smoking status: Current Every Day Smoker    Smokeless tobacco: Never Used   Substance and Sexual Activity    Alcohol use: Yes     Frequency: Never     Comment: occassionl    Drug use: No    Sexual activity: Not Currently     Partners: Male       FAMILY HISTORY:     Family History   Problem Relation Age of Onset   Marium Jack Cancer Mother     COPD Mother     Alcohol abuse Father     Diabetes Father     Hypertension Father     Heart Disease Father     Elevated Lipids Father     Hypertension Brother     Hypertension Maternal Grandmother     Hypertension Maternal Grandfather        REVIEW OF SYSTEMS:     Negative for fevers, chills, chest pain, shortness of breath, weight loss, recent illness     General: Negative for fever and chills. No unexpected change in weight. Denies fatigue. No change in appetite. Skin: Negative for rash or itching. HEENT: Negative for congestion, sore throat, neck pain and neck stiffness. No change in vision or hearing. Hasn't noted any enlarged lymph nodes in the neck. Cardiovascular:  Negative for chest pain and palpitations. Has not noted pedal edema. Respiratory: Negative for cough, colds, sinus, hemoptysis, shortness of breath and wheezing. Gastrointestinal: Negative for nausea and vomiting, rectal bleeding, coffee ground emesis, abdominal pain, diarrhea and constipation. Genitourinary: Negative for dysuria, burning on micturition. No flank pain, no foul smelling urine, no difficulty with initiating urination. (+) urgency and frequency  Hematological: Negative for bleeding or easy bruising. Musculoskeletal: Negative for arthralgias, back pain or neck pain. Neurological: Negative for dizziness, seizures or syncopal episodes. Denies headaches. Endocrine: Denies excessive thirst.  No heat/cold intolerance. Psychiatric: Negative for depression or insomnia.     PHYSICAL EXAMINATION:     VITALS:   Visit Vitals  BP (!) 167/99 Pulse 69   Resp 12   Ht 5' 6\" (1.676 m)   Wt 212 lb (96.2 kg)   SpO2 100%   BMI 34.22 kg/m²       Pain Assessment  1/21/2020   Location of Pain Ankle   Location Modifiers Left   Severity of Pain 0   Quality of Pain -   Duration of Pain -   Frequency of Pain -   Aggravating Factors -   Limiting Behavior -   Relieving Factors -   Result of Injury -        Pain Location: Ankle      GEN:  Well developed, well nourished 47 y.o. female in no acute distress. PSYCH: Alert an oriented to person, place and time. Mood, memory, affect, behavior and judgment normal. Speech normal in context and clarity. HEENT: Normocephalic and atraumatic. Eyes: Conjunctivae and EOM are normal.Pupils are equal, round, and reactive to light. External ear normal appearance, external nose normal appearing. Mouth/Throat: Oropharynx is clear and moist, able to handle oral secretions w/out difficulty, airway patent. NECK: Supple. Normal ROM, No lymphadenopathy. Trachea is midline. No bruising, swelling or deformity  RESP: Clear to auscultation bilaterally. No audible wheezing from mouth. No rales, rhonchi. Normal effort and breath sounds. No respiratory use of accessory muscles during breathing or distress  CHEST/ABDOMEN: Observation reveals: No audible wheezing from mouth. No accessory use of chest muscles during breathing. Non tender abdomen  CARDIO:  Normal rate, regular rhythm and normal heart sounds. No MGR. ABDOMEN: Non-tender, non-distended, normoactive bowel sounds in all four quadrants. There is no tenderness. There is no rebound and no guarding. BACK: No CVA or spinal tenderness  BREAST:  Deferred  PELVIC:    Deferred   RECTAL:  Deferred   :           Deferred  EXTREMITIES: EXAMINATION OF:   ANKLE/FOOT left    Gait: in CAM walker boot  Tenderness: moderate tender achilles tendon without deformity palpable:  Swelling: Moderate fusiform swelling at the noninsertional point of her achilies tendon. No warmth.    Calf tenderness: Absent for calf or gastrocnemius muscle regions   Soft, supple, non tender, non taut lower extremity compartments   NONE to Medial Malleolar, 4/5 Met base midfoot, achilles, tib post, or   NONE to syndesmosis. no to plantar fascia, or central calcaneal region  Cutaneous: WNL  Joint Motion: WNL   Joint / Tendon Stability: No Ankle or Subtalar instability or joint laxity. No peroneal sublux ability or dislocation  Alignment: neutral Hindfoot, none Metatarsus Adductus Metatarsus. Neuro Motor/Sensory: NL/NL  Vascular: NL foot/ankle pulses  Lymphatics: No extremity lymphedema, No calf swelling, no tenderness to calf muscles. RADIOGRAPHS & DIAGNOSTIC STUDIES:       MRI Results (most recent):  Results from Hospital Encounter encounter on 10/19/19   MRI ANKLE LT WO CONT    Narrative EXAMINATION: MRI left ankle without contrast    INDICATION: Achilles tendinitis, left ankle pain    COMPARISON: None    TECHNIQUE: Multiplanar multiecho MRI sequences of the left ankle performed  without contrast.    FINDINGS:    Bones/joints: Chronic appearing ossific fragment below the medial malleolus. Small Achilles enthesophyte. Large plantar calcaneal spur. No acute fracture. Multifocal mild TMT degenerative changes. Ligaments:  -Calcaneofibular ligament intact. Anterior talofibular ligament poorly defined. Posterior talofibular ligament intact. Anterior and posterior tibiofibular  ligaments appear intact. -Deltoid ligament the fibers with intrasubstance signal changes and  irregularity. Spring ligament intact. Lisfranc ligament intact. Tendons:  -Distal Achilles tendinopathy characterized by thickening, intrasubstance  signal, and low-grade partial-thickness deep surface and interstitial tearing at  the insertion. Possible small avulsion fracture fragment lying anterior to the  distal Achilles.  Total length of abnormal tendon is roughly 7.4 cm.  -Peroneal tendons intact.  -Flexor tendons intact. -Extensor tendons intact. Miscellaneous: Minimal edema in the sinus tarsi with fat signal largely  preserved. Central band plantar fascia thickening. Small subtalar joint  effusion. Impression IMPRESSION:    Partial thickness tear of the distal Achilles tendon with underlying  tendinopathy as detailed above. -Possible small avulsion fracture fragment lying anterior to the distal Achilles  tendon just above insertion. Recommend correlation with radiographs. Age-indeterminate anterior talofibular ligament injury, probably nonacute. Chronic appearing central band plantar fasciitis. See additional details above. LABS:     @  CBC:   Lab Results   Component Value Date/Time    WBC 9.7 01/14/2020 10:40 AM    RBC 5.52 (H) 01/14/2020 10:40 AM    HGB 17.2 (H) 01/14/2020 10:40 AM    HCT 51.0 (H) 01/14/2020 10:40 AM    PLATELET 593 68/73/0667 10:40 AM   , CMP:   Lab Results   Component Value Date/Time    Glucose 148 (H) 01/14/2020 10:40 AM    Sodium 143 01/14/2020 10:40 AM    Potassium 4.3 01/14/2020 10:40 AM    Chloride 106 01/14/2020 10:40 AM    CO2 32 01/14/2020 10:40 AM    BUN 8 01/14/2020 10:40 AM    Creatinine 0.78 01/14/2020 10:40 AM    Calcium 10.1 01/14/2020 10:40 AM    Anion gap 5 01/14/2020 10:40 AM    BUN/Creatinine ratio 10 (L) 01/14/2020 10:40 AM    Alk.  phosphatase 89 01/14/2020 10:40 AM    Protein, total 7.1 01/14/2020 10:40 AM    Albumin 4.0 01/14/2020 10:40 AM    Globulin 3.1 01/14/2020 10:40 AM    A-G Ratio 1.3 01/14/2020 10:40 AM    and Coagulation:   Lab Results   Component Value Date/Time    aPTT 27.7 01/14/2020 10:40 AM     Lab Results   Component Value Date/Time    Hemoglobin A1c 7.3 (H) 01/14/2020 10:40 AM          1/14/2020  1:46 PM - Jacob, Lab In [facility]     Component Value Flag Ref Range Units Status   Color YELLOW      Final   Appearance CLEAR      Final   Specific gravity 1.014   1.005 - 1.030   Final   pH (UA) 5.0   5.0 - 8.0   Final   Protein 30  Abnormal NEG mg/dL Final   Glucose NEGATIVE    NEG mg/dL Final   Ketone NEGATIVE    NEG mg/dL Final   Bilirubin NEGATIVE    NEG   Final   Blood NEGATIVE    NEG   Final   Urobilinogen 0.2   0.2 - 1.0 EU/dL Final   Nitrites NEGATIVE    NEG   Final   Leukocyte Esterase NEGATIVE    NEG   Final     Few bacteria    Preoperative labs were reviewed and are substantially within normal limits   Chest X-ray revealed no acute cardiopulmonary process     XR Results (most recent):  Results from Hospital Encounter encounter on 01/11/20   XR CHEST PA LAT    Narrative EXAM: XR CHEST PA LAT    INDICATION: pre op    COMPARISON: None. FINDINGS: PA and lateral radiographs of the chest demonstrate clear lungs. The  cardiac and mediastinal contours and pulmonary vascularity are normal. Osseous  degenerative changes. Clips overlie the right upper quadrant. Impression IMPRESSION: No acute findings       EKG: is abnormal and pending Anesthesia review    EKG RESULTS     Procedure Component Value Units Date/Time    EKG, 12 LEAD, INITIAL [[id number]] Collected:  01/14/20 1214    Order Status:  Completed Updated:  01/14/20 1411     Ventricular Rate 73 BPM      Atrial Rate 73 BPM      P-R Interval 144 ms      QRS Duration 96 ms      Q-T Interval 434 ms      QTC Calculation (Bezet) 478 ms      Calculated P Axis 60 degrees      Calculated R Axis -18 degrees      Calculated T Axis 25 degrees      Diagnosis --     Normal sinus rhythm  Possible Left atrial enlargement  Nonspecific ST abnormality  Abnormal ECG  No previous ECGs available  Confirmed by Ani Arizmendi MD, Filipe Potter ([id number]) on 1/14/2020 2:11:36 PM              ASSESSMENT:     Encounter Diagnoses   Name Primary?  Achilles tendinitis of left lower extremity Yes    Pre-operative examination     Acute right ankle pain        PLAN:     Again, the alternatives, risks, and complications, as well as expected outcome were discussed.  The patient understands and agrees to proceed with the above listed surgery pending EKG review. Rx provided for Macrobid for pending UTI. Patient has been given Hibiclens wash with instructions and/or prescriptions or orders listed above.     Vu Rao PA-C  1/21/2020  9:21 AM      Sugey Leach MD  1/24/2020  7:03 AM

## 2020-01-21 NOTE — TELEPHONE ENCOUNTER
PA is required for Xarelto 10mg    Cover My Meds Key:  Camilo Beverage Key: [id number] - PA Case ID: [id number] - Rx #: [id number]

## 2020-01-21 NOTE — TELEPHONE ENCOUNTER
Discontinued the Xarelto. Prescription for the following medication e-prescribed to the patients pharmacy:    Orders Placed This Encounter    apixaban (ELIQUIS) 5 mg tablet     Sig: Take 1 Tab by mouth two (2) times a day.  Indications: deep vein thrombosis prevention     Dispense:  60 Tab     Refill:  0           Paige Vu PA-C  1/21/2020  5:15 PM

## 2020-01-21 NOTE — PROGRESS NOTES
Chief Complaint   Patient presents with    Pre-op Exam     1. Have you been to the ER, urgent care clinic since your last visit? Hospitalized since your last visit? No    2. Have you seen or consulted any other health care providers outside of the [address] [place] [address] since your last visit? Include any pap smears or colon screening. No     HPI  Edinson Moore comes in for preop evaluation. Referring physician: Dr Umesh Foster surgery: sharp excisional debridement and repair of left achilles tendon, retrocalcaneal bursectomy, calcaneal osteotomy, possible gastroc mónica release  Date of surgery: 01/24/2020  Indication for surgery: left achilles tendinitis, achilles tendon tear with pain. Patient has left achilles tendinitis with achillies tendon tear. She has pain for past 3 weeks. She is wearing a CAM boot walker. Seen by the orthopedist and now planned for above procedure. She has been in Cam walker boot for 3 months. She has had labs and EKG done that are stable. Blood pressure slightly elevated today but I have increased her dose of losartan to 50 mg daily. She should have blood pressure rechecked. She is medically cleared to proceed with planned procedure. HTN: She is on losartan, BP today is elevated. She denies headache, changes in vision or focal weakness. DM2: She is on metformin, last hba1c was 7.3. this is controlled. We will continue with the current treatment plan. Dyslipidemia: she is on simvastatin, stable. She would like a refill of medication. Prescription will be sent in. Insomnia: she has been on [place] for long time. She would like a refill of medication. Discussed the long-term use of Ambien with patient. We will discuss this further at next visit. 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 Takes gabapentin for this. She would like a refill of medication. Prescription is given.   Right breast cancer: she has a h/o right breast cancer, had surgery and chemotherapy. She is f/u by the oncologist. She has lasix to take for swelling around the breast.  Hot flashes: on venlafaxine, stable. Knee OA; she is on meloxicam.  She will hold this 5 days prior to planned procedure. Urinary urgency: Patient has urinary urgency on and off. She denies dysuria or hematuria. Today urinalysis that is stable. She is on furosemide and this can cause her to go to the bathroom often. May need evaluation for overactive bladder. We will discuss this at next visit. May need referral to see the urologist.    Past Medical History  Past Medical History:   Diagnosis Date    Breast cancer [facility])     right breast    Diabetes ([facility] [address]     Hypercholesterolemia     Hypertension     Insomnia        Surgical History  Past Surgical History:   Procedure Laterality Date    HX CARPAL TUNNEL RELEASE Bilateral     HX  SECTION      HX CHOLECYSTECTOMY      HX KNEE ARTHROSCOPY Bilateral     HX MASTECTOMY Right 2013    HX TUBAL LIGATION          Medications  Current Outpatient Medications   Medication Sig Dispense Refill    zolpidem (AMBIEN) 10 mg tablet Take 1 Tab by mouth nightly as needed for Sleep. Max Daily Amount: 10 mg. 30 Tab 0    losartan (COZAAR) 25 mg tablet Take 1 Tab by mouth daily. 90 Tab 1    furosemide (LASIX) 20 mg tablet Take 1 Tab by mouth daily as needed for Other (severe swelling). 90 Tab 1    metFORMIN (GLUCOPHAGE) 500 mg tablet Take 1 Tab by mouth two (2) times a day. 180 Tab 1    simvastatin (ZOCOR) 40 mg tablet Take 1 Tab by mouth nightly. 90 Tab 1    venlafaxine (EFFEXOR) 37.5 mg tablet Take 1 Tab by mouth daily. 90 Tab 1    gabapentin (NEURONTIN) 100 mg capsule Take 1 Cap by mouth three (3) times daily. Max Daily Amount: 300 mg. 270 Cap 1    meloxicam (MOBIC) 15 mg tablet Take 1 Tab by mouth daily (with breakfast).  30 Tab 0       Allergies  Allergies   Allergen Reactions    Aspirin Swelling    Codeine Swelling    Penicillins Swelling Family History  Family History   Problem Relation Age of Onset   Mayo Kim Cancer Mother     COPD Mother     Alcohol abuse Father     Diabetes Father     Hypertension Father     Heart Disease Father     Elevated Lipids Father     Hypertension Brother     Hypertension Maternal Grandmother     Hypertension Maternal Grandfather        Social History  Social History     Socioeconomic History    Marital status:      Spouse name: Not on file    Number of children: Not on file    Years of education: Not on file    Highest education level: Not on file   Occupational History    Not on file   Social Needs    Financial resource strain: Not on file    Food insecurity:     Worry: Not on file     Inability: Not on file    Transportation needs:     Medical: Not on file     Non-medical: Not on file   Tobacco Use    Smoking status: Current Every Day Smoker    Smokeless tobacco: Never Used   Substance and Sexual Activity    Alcohol use: Yes     Frequency: Never     Comment: occassionl    Drug use: No    Sexual activity: Not Currently     Partners: Male   Lifestyle    Physical activity:     Days per week: Not on file     Minutes per session: Not on file    Stress: Not on file   Relationships    Social connections:     Talks on phone: Not on file     Gets together: Not on file     Attends [employer] service: Not on file     Active member of club or organization: Not on file     Attends meetings of clubs or organizations: Not on file     Relationship status: Not on file    Intimate partner violence:     Fear of current or ex partner: Not on file     Emotionally abused: Not on file     Physically abused: Not on file     Forced sexual activity: Not on file   Other Topics Concern    Not on file   Social History Narrative    Not on file       Review of Systems  Review of Systems - Review of all systems is negative except as noted above in the HPI.       Vital Signs  Visit Vitals  BP (!) 156/95 (BP 1 Location: Left arm, BP Patient Position: Sitting)   Pulse 76   Temp 95.5 °F (35.3 °C) (Oral)   Resp 18   Ht 5' 6\" (1.676 m)   Wt 212 lb (96.2 kg)   SpO2 96%   BMI 34.22 kg/m²         Physical Exam  Physical Examination: General appearance - alert, well appearing, and in no distress, oriented to person, place, and time, overweight and acyanotic, in no respiratory distress  Mental status - alert, oriented to person, place, and time, affect appropriate to mood  Neck - supple, no significant adenopathy  Lymphatics - no palpable lymphadenopathy  Chest - clear to auscultation, no wheezes, rales or rhonchi, symmetric air entry  Heart - normal rate and regular rhythm, S1 and S2 normal  Abdomen - no rebound tenderness noted  Neurological - neck supple without rigidity  Musculoskeletal -left leg and foot in cam walker boot  Extremities - intact peripheral pulses    Results  Results for orders placed or performed during the hospital encounter of 01/14/20   CULTURE, URINE   Result Value Ref Range    Special Requests: NO SPECIAL REQUESTS      Culture result:        [id number]  COLONIES/mL  MIXED GRAM POSITIVE ESTHELA, PROBABLE SKIN/GENITAL CONTAMINATION. HEMOGLOBIN A1C W/O EAG   Result Value Ref Range    Hemoglobin A1c 7.3 (H) 4.2 - 5.6 %   PTT   Result Value Ref Range    aPTT 27.7 23.0 - 36.4 SEC   CBC WITH AUTOMATED DIFF   Result Value Ref Range    WBC 9.7 4.6 - 13.2 K/uL    RBC 5.52 (H) 4.20 - 5.30 M/uL    HGB 17.2 (H) 12.0 - 16.0 g/dL    HCT 51.0 (H) 35.0 - 45.0 %    MCV 92.4 74.0 - 97.0 FL    MCH 31.2 24.0 - 34.0 PG    MCHC 33.7 31.0 - 37.0 g/dL    RDW 13.7 11.6 - 14.5 %    PLATELET 372 676 - 511 K/uL    MPV 10.8 9.2 - 11.8 FL    NEUTROPHILS 62 40 - 73 %    LYMPHOCYTES 29 21 - 52 %    MONOCYTES 7 3 - 10 %    EOSINOPHILS 2 0 - 5 %    BASOPHILS 0 0 - 2 %    ABS. NEUTROPHILS 6.0 1.8 - 8.0 K/UL    ABS. LYMPHOCYTES 2.8 0.9 - 3.6 K/UL    ABS. MONOCYTES 0.7 0.05 - 1.2 K/UL    ABS. EOSINOPHILS 0.2 0.0 - 0.4 K/UL    ABS.  BASOPHILS 0.0 0.0 - 0.1 K/UL    DF AUTOMATED     METABOLIC PANEL, COMPREHENSIVE   Result Value Ref Range    Sodium 143 136 - 145 mmol/L    Potassium 4.3 3.5 - 5.5 mmol/L    Chloride 106 100 - 111 mmol/L    CO2 32 21 - 32 mmol/L    Anion gap 5 3.0 - 18 mmol/L    Glucose 148 (H) 74 - 99 mg/dL    BUN 8 7.0 - 18 MG/DL    Creatinine 0.78 0.6 - 1.3 MG/DL    BUN/Creatinine ratio 10 (L) 12 - 20      GFR est AA >60 >60 ml/min/1.73m2    GFR est non-AA >60 >60 ml/min/1.73m2    Calcium 10.1 8.5 - 10.1 MG/DL    Bilirubin, total 0.7 0.2 - 1.0 MG/DL    ALT (SGPT) 67 (H) 13 - 56 U/L    AST (SGOT) 50 (H) 10 - 38 U/L    Alk. phosphatase 89 45 - 117 U/L    Protein, total 7.1 6.4 - 8.2 g/dL    Albumin 4.0 3.4 - 5.0 g/dL    Globulin 3.1 2.0 - 4.0 g/dL    A-G Ratio 1.3 0.8 - 1.7     URINALYSIS W/ RFLX MICROSCOPIC   Result Value Ref Range    Color YELLOW      Appearance CLEAR      Specific gravity 1.014 1.005 - 1.030      pH (UA) 5.0 5.0 - 8.0      Protein 30 (A) NEG mg/dL    Glucose NEGATIVE  NEG mg/dL    Ketone NEGATIVE  NEG mg/dL    Bilirubin NEGATIVE  NEG      Blood NEGATIVE  NEG      Urobilinogen 0.2 0.2 - 1.0 EU/dL    Nitrites NEGATIVE  NEG      Leukocyte Esterase NEGATIVE  NEG     URINE MICROSCOPIC ONLY   Result Value Ref Range    WBC 1 to 2 0 - 4 /hpf    RBC 1 to 2 0 - 5 /hpf    Epithelial cells 1+ 0 - 5 /lpf    Bacteria FEW (A) NEG /hpf    Hyaline cast 0 to 2 0 - 2 /lpf    Yeast FEW (A) NEG     EKG, 12 LEAD, INITIAL   Result Value Ref Range    Ventricular Rate 73 BPM    Atrial Rate 73 BPM    P-R Interval 144 ms    QRS Duration 96 ms    Q-T Interval 434 ms    QTC Calculation (Bezet) 478 ms    Calculated P Axis 60 degrees    Calculated R Axis -18 degrees    Calculated T Axis 25 degrees    Diagnosis       Normal sinus rhythm  Possible Left atrial enlargement  Nonspecific ST abnormality  Abnormal ECG  No previous ECGs available  Confirmed by Donald Escobedo MD, Cecil Pacer ([id number]) on 1/14/2020 2:11:36 PM         ASSESSMENT and PLAN    ICD-10-CM ICD-9-CM    1.  Preop examination Z01.818 V72.84    2. Achilles tendinitis of left lower extremity M76.62 726.71    3. Essential hypertension I10 401.9    4. Type 2 diabetes mellitus without complication, without long-term current use of insulin (HCC) E11.9 250.00 metFORMIN (GLUCOPHAGE) 500 mg tablet   5. Hot flashes R23.2 782.62 venlafaxine (EFFEXOR) 37.5 mg tablet   6. Urinary urgency R39.15 788.63 REFERRAL TO UROLOGY   7. Insomnia, unspecified type G47.00 780.52 zolpidem (AMBIEN) 10 mg tablet      DISCONTINUED: zolpidem (AMBIEN) 10 mg tablet      DISCONTINUED: zolpidem (AMBIEN) 10 mg tablet   8. History of right breast cancer Z85.3 V10.3 furosemide (LASIX) 20 mg tablet      gabapentin (NEURONTIN) 100 mg capsule      DISCONTINUED: gabapentin (NEURONTIN) 100 mg capsule      DISCONTINUED: gabapentin (NEURONTIN) 100 mg capsule   9. Hypercholesterolemia E78.00 272.0 simvastatin (ZOCOR) 40 mg tablet   10. Acute right ankle pain M25.571 719.47 DISCONTINUED: meloxicam (MOBIC) 15 mg tablet     338.19    11. Severe obesity ([facility]) E66.01 278.01 DISCONTINUED: meloxicam (MOBIC) 15 mg tablet   12. Tendonitis, Achilles, right M76.61 726.71 DISCONTINUED: meloxicam (MOBIC) 15 mg tablet     lab results and schedule of future lab studies reviewed with patient  reviewed diet, exercise and weight control  cardiovascular risk and specific lipid/LDL goals reviewed  reviewed medications and side effects in detail  specific diabetic recommendations: low cholesterol diet, weight control and daily exercise discussed, home glucose monitoring emphasized, all medications, side effects and compliance discussed carefully, glycohemoglobin and other lab monitoring discussed and long term diabetic complications discussed    I have discussed the diagnosis with the patient and the intended plan of care as seen in the above orders. The patient has received an after-visit summary and questions were answered concerning future plans.  I have discussed medication, side effects, and warnings with the patient in detail. The patient verbalized understanding and is in agreement with the plan of care. The patient will contact the office with any additional concerns. More than 50% of 40-minute visit spent counseling and coordinating care with patient face to face on hypertension, diabetes mellitus, neuropathy and management options. Discussed need for compliance with treatment regimen, follow-up, and taking responsibility for her disease process. Fransisco Stacy MD    PLEASE NOTE:   This document has been produced using voice recognition software.  Unrecognized errors in transcription may be present

## 2020-01-22 NOTE — BRIEF OP NOTE
BRIEF OPERATIVE NOTE    Date of Procedure: 1/24/2020   Preoperative Diagnosis: LEFT ACHILLES TENDON TEAR, INITIAL ENCOUNTER, INSERTIONAL CALCIFICA ACHILLES TENDINOPATHY  Postoperative Diagnosis: as aabove  Procedure(s):  1. SHARP EXCISIONAL DEBRIDEMENT AND REPAIR OF LEFT ACHILLES TENDON  2. RETROCALCANEAL BURSECTOMY, CALCANEAL OSTEOTOMY  3.  POSSIBLE GASTROC MYRON RELEASE/ NEVER BLOCK/ ARTHREX/ LARGE C-ARM  Surgeon(s) and Role:     * Fiona Sood MD - Primary    Pako Tracy PA:  ASSISTANT    Pinky Cordova MPA, MA, PA-C was medically necessary for the procedure. She assisted in : patient positioning, surgical incision retraction, placement of hardware, incision closure, placement of DME, and transfer of the patient to the PACU. IV FLUIDS:  900 ml IVF  Tourniquet Time:  45 minutes at  300  Mm HG     Surgical Staff:  Circ-1: Kelton Hunter RN  Physician Assistant: (Unknown)  Scrub Tech-1: Bony Andrade  Surg Asst-1: Kaylie Saul  No case tracking events are documented in the log. Anesthesia: General   Estimated Blood Loss:  5 ml EBL  IV FLUIDS:  900 ml IVF  Tourniquet Time:  45 minutes at  300  Mm HG   Specimens: * No specimens in log *   Findings:  CALCIFIC INSERTIONAL ACHILLES TENDONOPATHY    Complications: NONE  Implants:  [address]  Implant Name Type Inv.  Item Serial No.  Lot No. LRB No. Used Action   IMPL SYS BIOCOMPOSITE ACHILLES -- [employer]MARIA[employer] - [id number]  IMPL SYS BIOCOMPOSITE ACHILLES -- [facility] 122 [id number] Left 1 Implanted

## 2020-01-22 NOTE — OP NOTES
Patient: Dee Godoy                MRN:@           N: [id number]   YOB: 1965         AGE: 47 y.o. SEX: female       OPERATIVE REPORT    Implant Name Type Inv. Item Serial No.  Lot No. LRB No. Used Action   IMPL SYS BIOCOMPOSITE ACHILLES -- [employer] - [id number]  IMPL SYS [facility] [id number] Left 1 Implanted         OPERATIVE REPORT       Dee Godoy was taken to the operating room on 1/24/2020 . General anesthesia was performed. Regional anesthesia was performed by anesthesia service. A thigh tourniquet was placed on the left well protected thigh (web roll cotton padded thigh). This patient was positioned prone after general anesthesia successful anesthesia was attained. The left leg was thoroughly cleaned/prepped with: Chlorhexidine gluconate scrub and Chlorhexidine yellow prep stick (all tip of toes, entire foot, prepped all the way up to the tourniquet), surgery then commenced. A formal time out was conducted: identifying the patient, the surgical location, the informed signed consent for procedure, and the signature was visible at the surgical field. It was also confirmed the patient did receive preoperative antibiotics. All surgical members agreed to the signed consent    A formal verbal time out was conducted: identifying the patient, identifying the surgical location, reading the informed written signed consent for procedure, confirmation that  the patient did receive preoperative antibiotics and that my signature on the patient was visible at the surgical field. All members of the surgical team agreed to the consent process. The tourniquet was inflated with a pressure of 325 mm HG after the limb was exsanguinated. DISTAL ACHILLES    A typical 8 cm  J type incision was made medial to the Left Achilles tendon, and the curved distally from the medial to lateral direction.  I identified the paratenon and the achilles tendon. I split the distal achilles tendon directly midline (starting distal and going proximal direction about 4 to 6 cm). I sharply elevated the distal achilles from the calcaneus (central towards medial direction and central towards lateral direction. I  removed the degenerative spurs and degenerative portions of the distal achilles tendon,  then removed linear strips of tendinopathic tissue. (thus a sharp excisional debridement with 15 blade, rongeur, osteotome). The distal achilles remained attached at the lateral and medial margin regions and I detached 60 % of the distal achilles. Retrocalcaneal bursectomy was performed     I used the [employer] Speed [place] Bridge Technique to repair the achilles tendon back to bone. I used two (parallel placed)  4.75 mm bio absorbable threaded suture screws and then the 4.75 mm push lock system (placed parallel fashion). I repaired the central surgical split in the achilles tendon with O un dyed vicryl suture. I tensioned the sutures appropriately and tested the repair with gently ROM. An excellent repair was conducted. FHL    A FHL tendon transfer was not performed as I did not have to remove more than 50% of the achilles tendon.           Renetta Nolan MD  1/24/2020  9:34 AM

## 2020-01-22 NOTE — TELEPHONE ENCOUNTER
LMOVM informing patient to give our office a call back. If patient calls back, please inform her of RANDI Vaughn's message.

## 2020-01-23 ENCOUNTER — ANESTHESIA EVENT (OUTPATIENT)
Dept: SURGERY | Age: 55
End: 2020-01-23
Payer: COMMERCIAL

## 2020-01-24 ENCOUNTER — HOSPITAL ENCOUNTER (OUTPATIENT)
Age: 55
Setting detail: OUTPATIENT SURGERY
Discharge: HOME | End: 2020-01-24
Attending: ORTHOPAEDIC SURGERY | Admitting: ORTHOPAEDIC SURGERY
Payer: COMMERCIAL

## 2020-01-24 ENCOUNTER — DOCUMENTATION ONLY (OUTPATIENT)
Dept: ORTHOPEDIC SURGERY | Age: 55
End: 2020-01-24

## 2020-01-24 ENCOUNTER — ANESTHESIA (OUTPATIENT)
Dept: SURGERY | Age: 55
End: 2020-01-24
Payer: COMMERCIAL

## 2020-01-24 ENCOUNTER — APPOINTMENT (OUTPATIENT)
Dept: GENERAL RADIOLOGY | Age: 55
End: 2020-01-24
Attending: ORTHOPAEDIC SURGERY
Payer: COMMERCIAL

## 2020-01-24 VITALS
OXYGEN SATURATION: 98 % | BODY MASS INDEX: 34.23 KG/M2 | TEMPERATURE: 96 F | WEIGHT: 213 LBS | RESPIRATION RATE: 14 BRPM | HEART RATE: 61 BPM | SYSTOLIC BLOOD PRESSURE: 130 MMHG | HEIGHT: 66 IN | DIASTOLIC BLOOD PRESSURE: 62 MMHG

## 2020-01-24 LAB
GLUCOSE BLD STRIP.AUTO-MCNC: 163 MG/DL (ref 70–110)
GLUCOSE BLD STRIP.AUTO-MCNC: 170 MG/DL (ref 70–110)

## 2020-01-24 PROCEDURE — 74011000250 HC RX REV CODE- 250: Performed by: PHYSICIAN ASSISTANT

## 2020-01-24 PROCEDURE — 76942 ECHO GUIDE FOR BIOPSY: CPT | Performed by: ANESTHESIOLOGY

## 2020-01-24 PROCEDURE — 74011250636 HC RX REV CODE- 250/636: Performed by: ANESTHESIOLOGY

## 2020-01-24 PROCEDURE — 76210000006 HC OR PH I REC 0.5 TO 1 HR: Performed by: ORTHOPAEDIC SURGERY

## 2020-01-24 PROCEDURE — C1713 ANCHOR/SCREW BN/BN,TIS/BN: HCPCS | Performed by: ORTHOPAEDIC SURGERY

## 2020-01-24 PROCEDURE — 74011250636 HC RX REV CODE- 250/636: Performed by: PHYSICIAN ASSISTANT

## 2020-01-24 PROCEDURE — 77030013079 HC BLNKT BAIR HGGR 3M -A: Performed by: ANESTHESIOLOGY

## 2020-01-24 PROCEDURE — 77030002916 HC SUT ETHLN J&J -A: Performed by: ORTHOPAEDIC SURGERY

## 2020-01-24 PROCEDURE — 77030040361 HC SLV COMPR DVT MDII -B: Performed by: ORTHOPAEDIC SURGERY

## 2020-01-24 PROCEDURE — 77030020753 HC CUF TRNQT 1BLA STRY -B: Performed by: ORTHOPAEDIC SURGERY

## 2020-01-24 PROCEDURE — 77030008683 HC TU ET CUF COVD -A: Performed by: ANESTHESIOLOGY

## 2020-01-24 PROCEDURE — 77030006773 HC BLD SAW OSC BRSM -A: Performed by: ORTHOPAEDIC SURGERY

## 2020-01-24 PROCEDURE — 77030018074 HC RTVR SUT ARTH4 S&N -B: Performed by: ORTHOPAEDIC SURGERY

## 2020-01-24 PROCEDURE — 74011636637 HC RX REV CODE- 636/637: Performed by: ANESTHESIOLOGY

## 2020-01-24 PROCEDURE — 74011250636 HC RX REV CODE- 250/636: Performed by: NURSE ANESTHETIST, CERTIFIED REGISTERED

## 2020-01-24 PROCEDURE — 77030018836 HC SOL IRR NACL ICUM -A: Performed by: ORTHOPAEDIC SURGERY

## 2020-01-24 PROCEDURE — 82962 GLUCOSE BLOOD TEST: CPT

## 2020-01-24 PROCEDURE — 74011000250 HC RX REV CODE- 250: Performed by: ANESTHESIOLOGY

## 2020-01-24 PROCEDURE — 74011000272 HC RX REV CODE- 272: Performed by: ORTHOPAEDIC SURGERY

## 2020-01-24 PROCEDURE — 77030040922 HC BLNKT HYPOTHRM STRY -A: Performed by: ORTHOPAEDIC SURGERY

## 2020-01-24 PROCEDURE — 74011250637 HC RX REV CODE- 250/637: Performed by: NURSE ANESTHETIST, CERTIFIED REGISTERED

## 2020-01-24 PROCEDURE — 74011000250 HC RX REV CODE- 250: Performed by: ORTHOPAEDIC SURGERY

## 2020-01-24 PROCEDURE — 77030002922 HC SUT FBRWRE ARTH -B: Performed by: ORTHOPAEDIC SURGERY

## 2020-01-24 PROCEDURE — 74011000250 HC RX REV CODE- 250: Performed by: NURSE ANESTHETIST, CERTIFIED REGISTERED

## 2020-01-24 PROCEDURE — 74011000258 HC RX REV CODE- 258: Performed by: PHYSICIAN ASSISTANT

## 2020-01-24 PROCEDURE — 64450 NJX AA&/STRD OTHER PN/BRANCH: CPT | Performed by: ANESTHESIOLOGY

## 2020-01-24 PROCEDURE — 76210000020 HC REC RM PH II FIRST 0.5 HR: Performed by: ORTHOPAEDIC SURGERY

## 2020-01-24 PROCEDURE — 74011636637 HC RX REV CODE- 636/637: Performed by: NURSE ANESTHETIST, CERTIFIED REGISTERED

## 2020-01-24 PROCEDURE — 77030031139 HC SUT VCRL2 J&J -A: Performed by: ORTHOPAEDIC SURGERY

## 2020-01-24 PROCEDURE — 76010000131 HC OR TIME 2 TO 2.5 HR: Performed by: ORTHOPAEDIC SURGERY

## 2020-01-24 PROCEDURE — 76060000035 HC ANESTHESIA 2 TO 2.5 HR: Performed by: ORTHOPAEDIC SURGERY

## 2020-01-24 PROCEDURE — 77030019605: Performed by: ORTHOPAEDIC SURGERY

## 2020-01-24 DEVICE — SYSTEM IMPL W/ BIOCOMPOSITE SUT ANCHR 2X5IN JUMPSTART DSG: Type: IMPLANTABLE DEVICE | Site: ACHILLES TENDON | Status: FUNCTIONAL

## 2020-01-24 RX ORDER — LIDOCAINE HYDROCHLORIDE 10 MG/ML
0.1 INJECTION, SOLUTION EPIDURAL; INFILTRATION; INTRACAUDAL; PERINEURAL AS NEEDED
Status: DISCONTINUED | OUTPATIENT
Start: 2020-01-24 | End: 2020-01-24 | Stop reason: HOSPADM

## 2020-01-24 RX ORDER — LIDOCAINE HYDROCHLORIDE 20 MG/ML
INJECTION, SOLUTION EPIDURAL; INFILTRATION; INTRACAUDAL; PERINEURAL AS NEEDED
Status: DISCONTINUED | OUTPATIENT
Start: 2020-01-24 | End: 2020-01-24 | Stop reason: HOSPADM

## 2020-01-24 RX ORDER — SODIUM CHLORIDE 0.9 % (FLUSH) 0.9 %
5-40 SYRINGE (ML) INJECTION AS NEEDED
Status: DISCONTINUED | OUTPATIENT
Start: 2020-01-24 | End: 2020-01-24 | Stop reason: HOSPADM

## 2020-01-24 RX ORDER — ROPIVACAINE HYDROCHLORIDE 5 MG/ML
30 INJECTION, SOLUTION EPIDURAL; INFILTRATION; PERINEURAL
Status: COMPLETED | OUTPATIENT
Start: 2020-01-24 | End: 2020-01-24

## 2020-01-24 RX ORDER — SODIUM CHLORIDE, SODIUM LACTATE, POTASSIUM CHLORIDE, CALCIUM CHLORIDE 600; 310; 30; 20 MG/100ML; MG/100ML; MG/100ML; MG/100ML
50 INJECTION, SOLUTION INTRAVENOUS CONTINUOUS
Status: DISCONTINUED | OUTPATIENT
Start: 2020-01-24 | End: 2020-01-24 | Stop reason: HOSPADM

## 2020-01-24 RX ORDER — NEOSTIGMINE METHYLSULFATE 1 MG/ML
INJECTION, SOLUTION INTRAVENOUS AS NEEDED
Status: DISCONTINUED | OUTPATIENT
Start: 2020-01-24 | End: 2020-01-24 | Stop reason: HOSPADM

## 2020-01-24 RX ORDER — SODIUM CHLORIDE, SODIUM LACTATE, POTASSIUM CHLORIDE, CALCIUM CHLORIDE 600; 310; 30; 20 MG/100ML; MG/100ML; MG/100ML; MG/100ML
100 INJECTION, SOLUTION INTRAVENOUS CONTINUOUS
Status: DISCONTINUED | OUTPATIENT
Start: 2020-01-24 | End: 2020-01-24 | Stop reason: HOSPADM

## 2020-01-24 RX ORDER — INSULIN LISPRO 100 [IU]/ML
INJECTION, SOLUTION INTRAVENOUS; SUBCUTANEOUS ONCE
Status: COMPLETED | OUTPATIENT
Start: 2020-01-24 | End: 2020-01-24

## 2020-01-24 RX ORDER — ROCURONIUM BROMIDE 10 MG/ML
INJECTION, SOLUTION INTRAVENOUS AS NEEDED
Status: DISCONTINUED | OUTPATIENT
Start: 2020-01-24 | End: 2020-01-24 | Stop reason: HOSPADM

## 2020-01-24 RX ORDER — MAGNESIUM SULFATE 100 %
4 CRYSTALS MISCELLANEOUS AS NEEDED
Status: DISCONTINUED | OUTPATIENT
Start: 2020-01-24 | End: 2020-01-24 | Stop reason: HOSPADM

## 2020-01-24 RX ORDER — EPHEDRINE SULFATE/0.9% NACL/PF 50 MG/5 ML
SYRINGE (ML) INTRAVENOUS AS NEEDED
Status: DISCONTINUED | OUTPATIENT
Start: 2020-01-24 | End: 2020-01-24 | Stop reason: HOSPADM

## 2020-01-24 RX ORDER — FAMOTIDINE 20 MG/1
20 TABLET, FILM COATED ORAL ONCE
Status: COMPLETED | OUTPATIENT
Start: 2020-01-24 | End: 2020-01-24

## 2020-01-24 RX ORDER — SODIUM CHLORIDE 0.9 % (FLUSH) 0.9 %
5-40 SYRINGE (ML) INJECTION EVERY 8 HOURS
Status: DISCONTINUED | OUTPATIENT
Start: 2020-01-24 | End: 2020-01-24 | Stop reason: HOSPADM

## 2020-01-24 RX ORDER — FENTANYL CITRATE 50 UG/ML
INJECTION, SOLUTION INTRAMUSCULAR; INTRAVENOUS AS NEEDED
Status: DISCONTINUED | OUTPATIENT
Start: 2020-01-24 | End: 2020-01-24 | Stop reason: HOSPADM

## 2020-01-24 RX ORDER — MIDAZOLAM HYDROCHLORIDE 1 MG/ML
2 INJECTION, SOLUTION INTRAMUSCULAR; INTRAVENOUS
Status: DISCONTINUED | OUTPATIENT
Start: 2020-01-24 | End: 2020-01-24 | Stop reason: HOSPADM

## 2020-01-24 RX ORDER — FENTANYL CITRATE 50 UG/ML
100 INJECTION, SOLUTION INTRAMUSCULAR; INTRAVENOUS
Status: DISCONTINUED | OUTPATIENT
Start: 2020-01-24 | End: 2020-01-24 | Stop reason: HOSPADM

## 2020-01-24 RX ORDER — DEXTROSE 50 % IN WATER (D50W) INTRAVENOUS SYRINGE
25-50 AS NEEDED
Status: DISCONTINUED | OUTPATIENT
Start: 2020-01-24 | End: 2020-01-24 | Stop reason: HOSPADM

## 2020-01-24 RX ORDER — ROPIVACAINE HYDROCHLORIDE 5 MG/ML
INJECTION, SOLUTION EPIDURAL; INFILTRATION; PERINEURAL
Status: COMPLETED | OUTPATIENT
Start: 2020-01-24 | End: 2020-01-24

## 2020-01-24 RX ORDER — GLYCOPYRROLATE 0.2 MG/ML
INJECTION INTRAMUSCULAR; INTRAVENOUS AS NEEDED
Status: DISCONTINUED | OUTPATIENT
Start: 2020-01-24 | End: 2020-01-24 | Stop reason: HOSPADM

## 2020-01-24 RX ORDER — SUCCINYLCHOLINE CHLORIDE 20 MG/ML
INJECTION INTRAMUSCULAR; INTRAVENOUS AS NEEDED
Status: DISCONTINUED | OUTPATIENT
Start: 2020-01-24 | End: 2020-01-24 | Stop reason: HOSPADM

## 2020-01-24 RX ORDER — FENTANYL CITRATE 50 UG/ML
50 INJECTION, SOLUTION INTRAMUSCULAR; INTRAVENOUS
Status: COMPLETED | OUTPATIENT
Start: 2020-01-24 | End: 2020-01-24

## 2020-01-24 RX ORDER — ONDANSETRON 2 MG/ML
INJECTION INTRAMUSCULAR; INTRAVENOUS AS NEEDED
Status: DISCONTINUED | OUTPATIENT
Start: 2020-01-24 | End: 2020-01-24 | Stop reason: HOSPADM

## 2020-01-24 RX ORDER — PROPOFOL 10 MG/ML
INJECTION, EMULSION INTRAVENOUS AS NEEDED
Status: DISCONTINUED | OUTPATIENT
Start: 2020-01-24 | End: 2020-01-24 | Stop reason: HOSPADM

## 2020-01-24 RX ADMIN — Medication 5 MG: at 07:57

## 2020-01-24 RX ADMIN — LIDOCAINE HYDROCHLORIDE 40 MG: 20 INJECTION, SOLUTION EPIDURAL; INFILTRATION; INTRACAUDAL; PERINEURAL at 07:36

## 2020-01-24 RX ADMIN — MIDAZOLAM 2 MG: 1 INJECTION INTRAMUSCULAR; INTRAVENOUS at 07:21

## 2020-01-24 RX ADMIN — ROCURONIUM BROMIDE 5 MG: 10 INJECTION, SOLUTION INTRAVENOUS at 07:36

## 2020-01-24 RX ADMIN — FENTANYL CITRATE 50 MCG: 50 INJECTION, SOLUTION INTRAMUSCULAR; INTRAVENOUS at 09:47

## 2020-01-24 RX ADMIN — PROPOFOL 150 MG: 10 INJECTION, EMULSION INTRAVENOUS at 07:36

## 2020-01-24 RX ADMIN — ONDANSETRON 4 MG: 2 INJECTION INTRAMUSCULAR; INTRAVENOUS at 09:00

## 2020-01-24 RX ADMIN — ROPIVACAINE HYDROCHLORIDE 25 ML: 5 INJECTION, SOLUTION EPIDURAL; INFILTRATION; PERINEURAL at 07:06

## 2020-01-24 RX ADMIN — SODIUM CHLORIDE 900 MG: 9 INJECTION, SOLUTION INTRAVENOUS at 07:28

## 2020-01-24 RX ADMIN — Medication 3 MG: at 09:08

## 2020-01-24 RX ADMIN — FENTANYL CITRATE 50 MCG: 50 INJECTION INTRAMUSCULAR; INTRAVENOUS at 07:36

## 2020-01-24 RX ADMIN — ROCURONIUM BROMIDE 20 MG: 10 INJECTION, SOLUTION INTRAVENOUS at 07:50

## 2020-01-24 RX ADMIN — LIDOCAINE HYDROCHLORIDE 0.1 ML: 10 INJECTION, SOLUTION EPIDURAL; INFILTRATION; INTRACAUDAL; PERINEURAL at 07:20

## 2020-01-24 RX ADMIN — SUCCINYLCHOLINE CHLORIDE 100 MG: 20 INJECTION, SOLUTION INTRAMUSCULAR; INTRAVENOUS at 07:36

## 2020-01-24 RX ADMIN — Medication 10 MG: at 08:38

## 2020-01-24 RX ADMIN — SODIUM CHLORIDE, SODIUM LACTATE, POTASSIUM CHLORIDE, AND CALCIUM CHLORIDE 50 ML/HR: 600; 310; 30; 20 INJECTION, SOLUTION INTRAVENOUS at 06:41

## 2020-01-24 RX ADMIN — INSULIN LISPRO 3 UNITS: 100 INJECTION, SOLUTION INTRAVENOUS; SUBCUTANEOUS at 06:44

## 2020-01-24 RX ADMIN — FENTANYL CITRATE 50 MCG: 50 INJECTION INTRAMUSCULAR; INTRAVENOUS at 08:04

## 2020-01-24 RX ADMIN — FAMOTIDINE 20 MG: 20 TABLET, FILM COATED ORAL at 06:41

## 2020-01-24 RX ADMIN — INSULIN LISPRO 3 UNITS: 100 INJECTION, SOLUTION INTRAVENOUS; SUBCUTANEOUS at 10:08

## 2020-01-24 RX ADMIN — ROPIVACAINE HYDROCHLORIDE 150 MG: 5 INJECTION EPIDURAL; INFILTRATION; PERINEURAL at 07:20

## 2020-01-24 RX ADMIN — GLYCOPYRROLATE 0.6 MG: 0.2 INJECTION INTRAMUSCULAR; INTRAVENOUS at 09:08

## 2020-01-24 RX ADMIN — FENTANYL CITRATE 50 MCG: 50 INJECTION, SOLUTION INTRAMUSCULAR; INTRAVENOUS at 09:57

## 2020-01-24 RX ADMIN — SODIUM CHLORIDE, SODIUM LACTATE, POTASSIUM CHLORIDE, AND CALCIUM CHLORIDE: 600; 310; 30; 20 INJECTION, SOLUTION INTRAVENOUS at 07:30

## 2020-01-24 RX ADMIN — FENTANYL CITRATE 100 MCG: 50 INJECTION, SOLUTION INTRAMUSCULAR; INTRAVENOUS at 07:21

## 2020-01-24 NOTE — ANESTHESIA PREPROCEDURE EVALUATION
Relevant Problems   No relevant active problems       Anesthetic History   No history of anesthetic complications            Review of Systems / Medical History  Patient summary reviewed and pertinent labs reviewed    Pulmonary  Within defined limits                 Neuro/Psych   Within defined limits           Cardiovascular    Hypertension: well controlled              Exercise tolerance: >4 METS     GI/Hepatic/Renal                Endo/Other    Diabetes: well controlled, type 2    Morbid obesity and cancer (H/O right breast cancer)     Other Findings              Physical Exam    Airway  Mallampati: II  TM Distance: 4 - 6 cm  Neck ROM: normal range of motion        Cardiovascular  Regular rate and rhythm,  S1 and S2 normal,  no murmur, click, rub, or gallop             Dental    Dentition: Edentulous     Pulmonary  Breath sounds clear to auscultation               Abdominal  GI exam deferred       Other Findings            Anesthetic Plan    ASA: 3  Anesthesia type: general and regional - sciatic single shot          Induction: Intravenous  Anesthetic plan and risks discussed with: Patient

## 2020-01-24 NOTE — ANESTHESIA POSTPROCEDURE EVALUATION
Procedure(s):  SHARP EXCISIONAL DEBRIDEMENT AND REPAIR OF LEFT ACHILLES TENDON, RETROCALCANEAL BURSECTOMY, CALCANEAL OSTECOTOMY. general, regional    Anesthesia Post Evaluation      Multimodal analgesia: multimodal analgesia not used between 6 hours prior to anesthesia start to PACU discharge  Patient location during evaluation: PACU  Patient participation: complete - patient participated  Level of consciousness: awake and alert  Pain score: 0  Airway patency: patent  Anesthetic complications: no  Cardiovascular status: acceptable  Respiratory status: acceptable  Hydration status: acceptable  Post anesthesia nausea and vomiting:  none      Vitals Value Taken Time   /60 1/24/2020 10:05 AM   Temp 36.6 °C (97.8 °F) 1/24/2020  9:35 AM   Pulse 71 1/24/2020 10:12 AM   Resp 12 1/24/2020 10:12 AM   SpO2 93 % 1/24/2020 10:12 AM   Vitals shown include unvalidated device data.

## 2020-01-24 NOTE — PROGRESS NOTES
da \"Disability and Leave Provider Statement\" received via fax and placed in the forms bin at [facility] on 1/24/20

## 2020-01-24 NOTE — ANESTHESIA PROCEDURE NOTES
Peripheral Block    Start time: 1/24/2020 6:57 AM  End time: 1/24/2020 7:07 AM  Performed by: Emerald Flores MD  Authorized by: Emerald Flores MD       Pre-procedure: Indications: at surgeon's request, post-op pain management and procedure for pain    Preanesthetic Checklist: patient identified, risks and benefits discussed, site marked, timeout performed, anesthesia consent given and patient being monitored    Timeout Time: 06:57          Block Type:   Block Type:  Sciatic single shot  Laterality:  Left  Monitoring:  Standard ASA monitoring, continuous pulse ox, frequent vital sign checks, oxygen, responsive to questions and heart rate  Injection Technique:  Single shot  Procedures: ultrasound guided and nerve stimulator    Patient Position: prone  Prep: chlorhexidine    Location:  Lower thigh  Needle Type:  Stimuplex  Needle Gauge:  21 G  Needle Localization:  Ultrasound guidance and nerve stimulator    Assessment:  Number of attempts:  1  Injection Assessment:  Incremental injection every 5 mL, no paresthesia, ultrasound image on chart, no intravascular symptoms, negative aspiration for blood and local visualized surrounding nerve on ultrasound  Patient tolerance:  Patient tolerated the procedure well with no immediate complications  Location:  PREOP HOLDING    Patient given 2 mg IV Versed and 100 mcg IV Fentanyl for sedation.     1/24/2020     7:11 AM     Duran Donovan MD

## 2020-01-24 NOTE — INTERVAL H&P NOTE
H&P Update: 
Lesly Benson was seen and examined. History and physical has been reviewed. The patient has been examined. There have been no significant clinical changes since the completion of the originally dated History and Physical. 
Patient identified by surgeon; surgical site was confirmed by patient and surgeon.

## 2020-01-24 NOTE — PROGRESS NOTES
conducted a pre-surgery visit with Batsheva Cashaveryenedelia, who is a 47 y.o.,female. The  provided the following Interventions:  Initiated a relationship of care and support. Plan:  Chaplains will continue to follow and will provide pastoral care on an as needed/requested basis.  recommends bedside caregivers page  on duty if patient shows signs of acute spiritual or emotional distress.     [address]  [phone]

## 2020-01-24 NOTE — DISCHARGE INSTRUCTIONS
ORTHOPAEDIC DISCHARGE PLAN     1. DISCHARGE DISPOSITION:  Home    2. FOLLOW UP RETURN TO OFFICE: 5 days    Call [phone] to confirm your appointment to see Dr. Du Hampton. Shoaib Crockett MD.   Follow up with Primary Care Provider in 7 to 10 days. 3. WEIGHT BEARING STATUS/ROM:    NO WEIGHT BEARING TO   the Left LOWER EXTREMITY      Home Care Equipment:    See below    4. ELEVATE the Left lower extremity on 1 pillow. Place the pillow horizontal so that no pressure is on the back of your heel    Please leave your current dressings and in place. Keep your dressings clean and dry to the: Left lower extremity      Please call [phone] ([address]) if any: fever, shakes, chills, intractable pain, or for any questions you have regarding your care/medical condition   1. If you experience any calf pain or swelling, or are having any shortness of  breath, chest pain, or extremity swelling, or bleeding thru any surgical dressings,  or Bleeding at any body location while you are taking on any blood thinners. ie (mouth,nose, skin sites:)   2. Please go to closest ER  ASAP for assessment to rule out a leg clot and to  assess any  bleeding. 3. After general anesthesia or intravenous sedation, for 24 hours or while taking  prescription Narcotics:      [x]  Limit your activities     [x]   Do not drive and operate hazardous machinery    [x]   Do not make important personal or business decisions    [x]   Do  not drink alcoholic beverages    [x]  If you have not urinated within 8 hours after discharge, please contact    your surgeon on call.      Report the following to your surgeon: If any below is true         [x]   Excessive pain, swelling, redness or odor of or around the surgical area       [x]   Temperature over 100.5       [x]  Nausea and vomiting lasting longer than 4 hours or if unable to take medications       [x]    Any signs of decreased circulation or nerve impairment to extremity:    Change in color, persistent  numbness, tingling, coldness or increase pain          [x]  No smoking/ No tobacco products/ Avoid exposure to second hand smoke    5. DIET:  Regular Diet  OTC (Nutritional supplements/multivitamins/calcium w/ Vitamin  D     6. ACTIVITY:   // No lifting, twisting, squatting, deep bending. crutches    7. INCISION CARE/DRESSINGS: Keep wound clean and dry ,ACE Wraps    Keep all pets away from  any wound present in order to prevent infection. 8. PAIN CONTROL: Prescriptions written:  DEMEROL       Start taking your pain medications when you get home    9. VTE prophylaxis : Start ELLIQUIS on date . START 1/24/2020 AT 3 PM PLEASE. 10. ANTIBIOTICS:  NITROFURANTOIN      11. [address] at [address] H&P:    Orders Placed This Encounter    AMB SUPPLY ORDER     Order dated: 1/21/2020  11:30 AM    Knee roller or knee crutch  Crutches,  Wheelchair,  Toilet Riser,  Shower Chair,  CURAD CAST COVER    meperidine (DEMEROL) 50 mg tablet     Sig: Take 0.5-1 Tabs by mouth every six (6) hours as needed for Pain for up to 7 days. Max Daily Amount: 200 mg. Dispense:  28 Tab     Refill:  0    promethazine (PHENERGAN) 25 mg tablet     Sig: Take 1 Tab by mouth every six (6) hours as needed for Nausea. Dispense:  30 Tab     Refill:  0    ELLIQUIS RX WRITTEN ALREADY; PLEASE SEE MEDICATION LIST                   polyethylene glycol (MIRALAX) 17 gram packet     Sig: Take 1 Packet by mouth daily. Dispense:  10 Packet     Refill:  1    nitrofurantoin, macrocrystal-monohydrate, (MACROBID) 100 mg capsule     Sig: Take 1 Cap by mouth two (2) times a day for 7 days.      Dispense:  14 Cap     Refill:  0                    Prescriptions have been e-scribed to patients pharmacy during the H&P          Barry Guevara MD  1/24/2020  7:04 AM

## 2020-01-29 ENCOUNTER — OFFICE VISIT (OUTPATIENT)
Dept: ORTHOPEDIC SURGERY | Age: 55
End: 2020-01-29

## 2020-01-29 VITALS
OXYGEN SATURATION: 94 % | HEART RATE: 76 BPM | BODY MASS INDEX: 34.23 KG/M2 | TEMPERATURE: 97.4 F | DIASTOLIC BLOOD PRESSURE: 96 MMHG | WEIGHT: 213 LBS | HEIGHT: 66 IN | RESPIRATION RATE: 12 BRPM | SYSTOLIC BLOOD PRESSURE: 169 MMHG

## 2020-01-29 DIAGNOSIS — M76.62 ACHILLES TENDINITIS OF LEFT LOWER EXTREMITY: Primary | ICD-10-CM

## 2020-01-29 DIAGNOSIS — Z98.890 POST-OPERATIVE STATE: ICD-10-CM

## 2020-01-29 NOTE — PROGRESS NOTES
1. Have you been to the ER, urgent care clinic since your last visit? Hospitalized since your last visit? No    2. Have you seen or consulted any other health care providers outside of the [address] [place] [address] since your last visit? Include any pap smears or colon screening.  No

## 2020-01-29 NOTE — PROGRESS NOTES
Patient: Devora Swift                MRN: [id number]       SSN: [id number]  YOB: 1965                     AGE: 47 y.o. SEX: female    Zebedee Halsted, MD      Chief Complaint:   Chief Complaint   Patient presents with    Ankle Pain     left ankle pain         DOS: 1/24/20      HPI:     The patient is a 47 y.o. female who presents today for follow up 5 days s/p:     Sharp Excisional Debridement And Repair Of Left Achilles Tendon, Retrocalcaneal Bursectomy, Calcaneal Ostecotomy - Left. Patient has been NWB to the left lower extremity. Patient denies any fever, chills, chest pain, shortness of breath or calf pain. There are no new illness or injuries to report since last seen in the office. Patient is on Xarelto for DVT prophylaxis. Constipation has not been a concern. No changes in medications, allergies, social or family history. Patient reports doing well other than her pain assessment indicators as listed below. Pain Assessment  1/29/2020   Location of Pain Ankle   Location Modifiers Left   Severity of Pain 0   Quality of Pain -   Duration of Pain -   Frequency of Pain -   Aggravating Factors -   Limiting Behavior -   Relieving Factors -   Result of Injury -         PHYSICAL EXAM:     Visit Vitals  BP (!) 169/96   Pulse 76   Temp 97.4 °F (36.3 °C) (Oral)   Resp 12   Ht 5' 6\" (1.676 m)   Wt 213 lb (96.6 kg)   SpO2 94%   BMI 34.38 kg/m²         Pain Scale: 0 - No pain/10    Pain Location: Ankle      GEN:  Alert, well developed, well nourished, well appearing 47 y.o. female seated comfortably in no acute distress. Speech normal in context and clarity. Psychiatric: Affect, mood and conduct are appropriate.  Alert, oriented x 3 alert, oriented x 3, memory grossly intact, no dementia  Patient arrives to office via: with assistive device: walker   Patient accompanied in the examination room by: spouse    M/S EXAMINATION OF: left foot/ankle  DRESSINGS: CDI other than mild soilage on dressing   DRAINAGE: none  INCISION: Incision looks good, skin well approximated, no dehiscence, nylon sutures in place without disruption. SKIN: mild edema, no erythema, no ecchymosis, no warmth    TENDERNESS:  mild tenderness to palpation   NEUROVASCULAR:  grossly intact. Positive distal pulses and capillary refill. DVT ASSESSMENT:  The calf is not tender to palpation. No evidence of DVT seen on physical exam.  ROM: not tested                  RADIOGRAPHS & DIAGNOSTIC STUDIES     None      IMPRESSION:     Encounter Diagnoses     ICD-10-CM ICD-9-CM   1. Achilles tendinitis of left lower extremity M76.62 726.71   2. Post-operative state Z98.890 V45.89       PLAN:       No orders of the defined types were placed in this encounter. · Continue activity modification    · Weight bearing status:  no weight bearing to the surgical extremity    · No lifting, twisting, squatting, deep bending, driving or strenuous activity. · Please follow up as instructed    · Please take medication as directed    · Follow RICE protocol (protecting skin)    · Maintain proper Nutrition    · Take a multivitamin, calcium + Vitamin D supplement daily (if tolerated)    · If you are a current smoker, quit or limit smoking    · Keep the current dressings on and in place. You need to keep this incision clean and dry. If you have a splint or cast on please keep this clean and dry as well. · You should expect swelling and bruising in the area over the next several days. You may elevate the surgical extremity on 1 pillow. Place the pillow horizontal so that no pressure is on the back of your heel. You may apply an icepack on top of the dressing to help minimize the swelling.      PLEASE SEEK IMMEDIATE ASSESSMENT BY ER PHYSICIAN IF ANY OF THE FOLLOWING EXIST:      Excessive pain, swelling, redness or odor of or around the surgical area    Temperature over 100.5    Nausea and vomiting lasting longer than 4 hours or if unable to take medications    Any signs of decreased circulation or nerve impairment to extremity: change in color, persistent numbness, tingling, coldness or increase pain    If any calf pain, calf tightness, shortness of breath, chest pain    Any difficulty breathing at rest or with ambulation, any chest tightness/soreness   Severe intractable pain, persistent swelling or drainage, development of a wound, incisional redness, finger/toe swelling or color changes, or CALF PAIN    · Perform ankle pumps with non-surgical/non-injured extremity to help reduce the risk of blood clots    · Keep all pets away from  any wound present in order to prevent infection. Patient has been consulted on with Dr. Davy Carlisle during this visit and he agrees with the assessment and plan    Patient expresses understanding of the plan. Patient education provided on post surgical care. REVIEW OF SYSTEMS:     Review of Systems: Chest pain: no  Shortness of breath: no  Fever: no  Night sweats: no  Weight loss: no  Constitutional signs: no  Review of all other systems is negative    Otherwise as noted in HPI      PAST MEDICAL HISTORY:     Past Medical History:   Diagnosis Date    Breast cancer ([facility] 2013    right breast    Breast cancer ([facility]     Diabetes ([facility]     Hypercholesterolemia     Hypertension     Insomnia     Obesity (BMI 30.0-34. 9)     UTI (urinary tract infection)        MEDICATIONS:     Current Outpatient Medications   Medication Sig    rivaroxaban (XARELTO) 10 mg tablet Take 1 Tab by mouth daily.  meperidine (DEMEROL) 50 mg tablet Take 0.5-1 Tabs by mouth every six (6) hours as needed for Pain for up to 7 days. Max Daily Amount: 200 mg.    losartan (COZAAR) 50 mg tablet Take 1 Tab by mouth daily.  furosemide (LASIX) 20 mg tablet Take 1 Tab by mouth daily as needed for Other (severe swelling).     metFORMIN (GLUCOPHAGE) 500 mg tablet Take 1 Tab by mouth two (2) times a day.  simvastatin (ZOCOR) 40 mg tablet Take 1 Tab by mouth nightly.  venlafaxine (EFFEXOR) 37.5 mg tablet Take 1 Tab by mouth daily.  [START ON 3/21/2020] zolpidem (AMBIEN) 10 mg tablet Take 1 Tab by mouth nightly as needed for Sleep. Max Daily Amount: 10 mg.    [START ON 3/21/2020] gabapentin (NEURONTIN) 100 mg capsule Take 1 Cap by mouth three (3) times daily. Max Daily Amount: 300 mg.  polyethylene glycol (MIRALAX) 17 gram packet Take 1 Packet by mouth daily. No current facility-administered medications for this visit.         ALLERGIES:     Allergies   Allergen Reactions    Aspirin Swelling    Codeine Swelling    Penicillins Swelling         PAST SURGICAL HISTORY:     Past Surgical History:   Procedure Laterality Date    HX CARPAL TUNNEL RELEASE Bilateral     HX  SECTION      HX CHOLECYSTECTOMY      HX HYSTERECTOMY      HX KNEE ARTHROSCOPY Bilateral     HX MASTECTOMY Right 2013    HX TUBAL LIGATION         SOCIAL HISTORY:     Social History     Socioeconomic History    Marital status:      Spouse name: Not on file    Number of children: Not on file    Years of education: Not on file    Highest education level: Not on file   Occupational History    Not on file   Social Needs    Financial resource strain: Not on file    Food insecurity:     Worry: Not on file     Inability: Not on file    Transportation needs:     Medical: Not on file     Non-medical: Not on file   Tobacco Use    Smoking status: Current Every Day Smoker     Packs/day: 1.00    Smokeless tobacco: Never Used   Substance and Sexual Activity    Alcohol use: Yes     Frequency: Never     Comment: occassionl    Drug use: No    Sexual activity: Not Currently     Partners: Male   Lifestyle    Physical activity:     Days per week: Not on file     Minutes per session: Not on file    Stress: Not on file   Relationships    Social connections:     Talks on phone: Not on file     Gets together: Not on file     Attends [employer] service: Not on file     Active member of club or organization: Not on file     Attends meetings of clubs or organizations: Not on file     Relationship status: Not on file    Intimate partner violence:     Fear of current or ex partner: Not on file     Emotionally abused: Not on file     Physically abused: Not on file     Forced sexual activity: Not on file   Other Topics Concern    Not on file   Social History Narrative    Not on file       FAMILY HISTORY:     Family History   Problem Relation Age of Onset    Cancer Mother     COPD Mother     Alcohol abuse Father     Diabetes Father     Hypertension Father     Heart Disease Father     Elevated Lipids Father     Hypertension Brother     Hypertension Maternal Grandmother     Hypertension Maternal Grandfather            Linda Landa PA-C  1/29/2020

## 2020-01-29 NOTE — PATIENT INSTRUCTIONS
· Continue activity modification · Weight bearing status:  no weight bearing to the surgical extremity · No lifting, twisting, squatting, deep bending, driving or strenuous activity. · Please follow up as instructed · Please take medication as directed · Follow RICE protocol (protecting skin) · Maintain proper Nutrition · Take a multivitamin, calcium + Vitamin D supplement daily (if tolerated) · If you are a current smoker, quit or limit smoking · Keep the current dressings on and in place. You need to keep this incision clean and dry. If you have a splint or cast on please keep this clean and dry as well. · You should expect swelling and bruising in the area over the next several days. You may elevate the surgical extremity on 1 pillow. Place the pillow horizontal so that no pressure is on the back of your heel. You may apply an icepack on top of the dressing to help minimize the swelling. PLEASE SEEK IMMEDIATE ASSESSMENT BY ER PHYSICIAN IF ANY OF THE FOLLOWING EXIST:  
 
? Excessive pain, swelling, redness or odor of or around the surgical area ? Temperature over 100.5 ? Nausea and vomiting lasting longer than 4 hours or if unable to take medications ? Any signs of decreased circulation or nerve impairment to extremity: change in color, persistent numbness, tingling, coldness or increase pain ? If any calf pain, calf tightness, shortness of breath, chest pain ? Any difficulty breathing at rest or with ambulation, any chest tightness/soreness ? Severe intractable pain, persistent swelling or drainage, development of a wound, incisional redness, finger/toe swelling or color changes, or CALF PAIN 
 
· Perform ankle pumps with non-surgical/non-injured extremity to help reduce the risk of blood clots · Keep all pets away from  any wound present in order to prevent infection. Acute Pain After Surgery: Care Instructions Your Care Instructions It's common to have some pain after surgery. Pain doesn't mean that something is wrong or that the surgery didn't go well. But when the pain is severe, it's important to work with your doctor to manage it. It's also important to be aware of a few facts about pain and pain medicine. · You are the only person who knows what your pain feels like. So be sure to tell your doctor when you are in pain or when the pain changes. Then he or she will know how to adjust your medicines. · Pain is often easier to control right after it starts. So it may be better to take regular doses of pain medicine and not wait until the pain gets bad. · Medicine can help control pain. But this doesn't mean you'll have no pain. Medicine works to keep the pain at a level you can live with. With time, you will feel better. Follow-up care is a key part of your treatment and safety. Be sure to make and go to all appointments, and call your doctor if you are having problems. It's also a good idea to know your test results and keep a list of the medicines you take. How can you care for yourself at home? · Be safe with medicines. Read and follow all instructions on the label. ? If the doctor gave you a prescription medicine for pain, take it as prescribed. ? If you are not taking a prescription pain medicine, ask your doctor if you can take an over-the-counter medicine. · If you take an over-the-counter pain medicine, such as acetaminophen (Tylenol), ibuprofen (Advil, Motrin), or naproxen (Aleve), read and follow all instructions on the label. · Do not take two or more pain medicines at the same time unless the doctor told you to. · Do not drink alcohol while you are taking pain medicines. · Try to walk each day if your doctor recommends it. Start by walking a little more than you did the day before. Bit by bit, increase the amount you walk. Walking increases blood flow. It also helps prevent pneumonia and constipation. · To prevent constipation from opioid pain medicines: 
? Talk to your doctor about a laxative. ? Include fruits, vegetables, beans, and whole grains in your diet each day. These foods are high in fiber. ? Drink plenty of fluids, enough so that your urine is light yellow or clear like water. Drink water, fruit juice, or other drinks that do not contain caffeine or alcohol. If you have kidney, heart, or liver disease and have to limit fluids, talk with your doctor before you increase the amount of fluids you drink. ? Take a fiber supplement, such as Citrucel or Metamucil, every day if needed. Read and follow all instructions on the label. If you take pain medicine for more than a few days, talk to your doctor before you take fiber. When should you call for help? Call your doctor now or seek immediate medical care if: 
  · Your pain gets worse. · Your pain is not controlled by medicine. Watch closely for changes in your health, and be sure to contact your doctor if you have any problems. Where can you learn more? Go to http://aminta-mely.info/. Enter (61) 599-692 in the search box to learn more about \"Acute Pain After Surgery: Care Instructions. \" Current as of: September 23, 2018 Content Version: 11.9 © 4282-0776 [employer]. Care instructions adapted under license by [employer] (which disclaims liability or warranty for this information). If you have questions about a medical condition or this instruction, always ask your healthcare professional. [place] [address] any warranty or liability for your use of this information.

## 2020-02-06 ENCOUNTER — DOCUMENTATION ONLY (OUTPATIENT)
Dept: ORTHOPEDIC SURGERY | Age: 55
End: 2020-02-06

## 2020-02-06 NOTE — PROGRESS NOTES
Received 2nd fax for [facility] and Leave Provider Statement\" at [facility] on 2/6/20

## 2020-02-07 ENCOUNTER — TELEPHONE (OUTPATIENT)
Dept: ORTHOPEDIC SURGERY | Age: 55
End: 2020-02-07

## 2020-02-07 NOTE — TELEPHONE ENCOUNTER
Form has been completed and faxed. Tried calling patient but phone is no longer in service.  Original is up front at  and copy placed in scanning bin

## 2020-02-12 ENCOUNTER — OFFICE VISIT (OUTPATIENT)
Dept: ORTHOPEDIC SURGERY | Age: 55
End: 2020-02-12

## 2020-02-12 VITALS
SYSTOLIC BLOOD PRESSURE: 151 MMHG | HEART RATE: 82 BPM | BODY MASS INDEX: 34.23 KG/M2 | OXYGEN SATURATION: 99 % | DIASTOLIC BLOOD PRESSURE: 89 MMHG | TEMPERATURE: 95.1 F | RESPIRATION RATE: 16 BRPM | WEIGHT: 213 LBS | HEIGHT: 66 IN

## 2020-02-12 DIAGNOSIS — M76.62 ACHILLES TENDINITIS OF LEFT LOWER EXTREMITY: Primary | ICD-10-CM

## 2020-02-12 DIAGNOSIS — Z98.890 POST-OPERATIVE STATE: ICD-10-CM

## 2020-02-12 RX ORDER — NITROFURANTOIN 25; 75 MG/1; MG/1
100 CAPSULE ORAL 2 TIMES DAILY
Qty: 14 CAP | Refills: 0 | Status: SHIPPED | OUTPATIENT
Start: 2020-02-12 | End: 2020-02-19

## 2020-02-12 NOTE — PATIENT INSTRUCTIONS
· Continue activity modification    · Weight bearing status:  no weight bearing to the surgical extremity    · No lifting, twisting, squatting, deep bending, driving or strenuous activity. · Please follow up as instructed    · Please take medication as directed    · Follow RICE protocol (protecting skin)    · Maintain proper Nutrition    · Take a multivitamin, calcium + Vitamin D supplement daily (if tolerated)    · If you are a current smoker, quit or limit smoking    · Keep the current dressings on and in place. You need to keep this incision clean and dry. If you have a splint or cast on please keep this clean and dry as well. · You should expect swelling and bruising in the area over the next several days. You may elevate the surgical extremity on 1 pillow. Place the pillow horizontal so that no pressure is on the back of your heel. You may apply an icepack on top of the dressing to help minimize the swelling. PLEASE SEEK IMMEDIATE ASSESSMENT BY ER PHYSICIAN IF ANY OF THE FOLLOWING EXIST:      Excessive pain, swelling, redness or odor of or around the surgical area    Temperature over 100.5    Nausea and vomiting lasting longer than 4 hours or if unable to take medications    Any signs of decreased circulation or nerve impairment to extremity: change in color, persistent numbness, tingling, coldness or increase pain    If any calf pain, calf tightness, shortness of breath, chest pain    Any difficulty breathing at rest or with ambulation, any chest tightness/soreness   Severe intractable pain, persistent swelling or drainage, development of a wound, incisional redness, finger/toe swelling or color changes, or CALF PAIN    · Perform ankle pumps with non-surgical/non-injured extremity to help reduce the risk of blood clots    · Keep all pets away from  any wound present in order to prevent infection.     Acute Pain After Surgery: Care Instructions  Your Care Instructions    It's common to have some pain after surgery. Pain doesn't mean that something is wrong or that the surgery didn't go well. But when the pain is severe, it's important to work with your doctor to manage it. It's also important to be aware of a few facts about pain and pain medicine. · You are the only person who knows what your pain feels like. So be sure to tell your doctor when you are in pain or when the pain changes. Then he or she will know how to adjust your medicines. · Pain is often easier to control right after it starts. So it may be better to take regular doses of pain medicine and not wait until the pain gets bad. · Medicine can help control pain. But this doesn't mean you'll have no pain. Medicine works to keep the pain at a level you can live with. With time, you will feel better. Follow-up care is a key part of your treatment and safety. Be sure to make and go to all appointments, and call your doctor if you are having problems. It's also a good idea to know your test results and keep a list of the medicines you take. How can you care for yourself at home? · Be safe with medicines. Read and follow all instructions on the label. ? If the doctor gave you a prescription medicine for pain, take it as prescribed. ? If you are not taking a prescription pain medicine, ask your doctor if you can take an over-the-counter medicine. · If you take an over-the-counter pain medicine, such as acetaminophen (Tylenol), ibuprofen (Advil, Motrin), or naproxen (Aleve), read and follow all instructions on the label. · Do not take two or more pain medicines at the same time unless the doctor told you to. · Do not drink alcohol while you are taking pain medicines. · Try to walk each day if your doctor recommends it. Start by walking a little more than you did the day before. Bit by bit, increase the amount you walk. Walking increases blood flow. It also helps prevent pneumonia and constipation.   · To prevent constipation from opioid pain medicines:  ? Talk to your doctor about a laxative. ? Include fruits, vegetables, beans, and whole grains in your diet each day. These foods are high in fiber. ? Drink plenty of fluids, enough so that your urine is light yellow or clear like water. Drink water, fruit juice, or other drinks that do not contain caffeine or alcohol. If you have kidney, heart, or liver disease and have to limit fluids, talk with your doctor before you increase the amount of fluids you drink. ? Take a fiber supplement, such as Citrucel or Metamucil, every day if needed. Read and follow all instructions on the label. If you take pain medicine for more than a few days, talk to your doctor before you take fiber. When should you call for help? Call your doctor now or seek immediate medical care if:    · Your pain gets worse. · Your pain is not controlled by medicine. Watch closely for changes in your health, and be sure to contact your doctor if you have any problems. Where can you learn more? Go to http://aminta-mely.info/. Enter (41) 800-137 in the search box to learn more about \"Acute Pain After Surgery: Care Instructions. \"  Current as of: September 23, 2018  Content Version: 11.9  © 0340-1183 [employer]. Care instructions adapted under license by [employer] (which disclaims liability or warranty for this information). If you have questions about a medical condition or this instruction, always ask your healthcare professional. [place] [address] any warranty or liability for your use of this information.

## 2020-02-12 NOTE — PROGRESS NOTES
Patient: Harjinder Vidal                MRN: [id number]       SSN: [id number]  YOB: 1965                     AGE: 47 y.o. SEX: female    Evelin Spears MD      Chief Complaint:   Chief Complaint   Patient presents with    Ankle Pain     left ankle pain         DOS: 1/24/20      HPI:     The patient is a 47 y.o. female who presents today for follow up 19 days s/p:     Sharp Excisional Debridement And Repair Of Left Achilles Tendon, Retrocalcaneal Bursectomy, Calcaneal Ostecotomy - Left. Patient has been NWB to the left lower extremity. Patient denies any fever, chills, chest pain, shortness of breath or calf pain. There are no new illness or injuries to report since last seen in the office. Patient is on Xarelto for DVT prophylaxis. Constipation has not been a concern. No changes in medications, allergies, social or family history. Patient states she is having symptoms of UTI and would like a refill of her Macrobid. Patient reports doing well other than her pain assessment indicators as listed below. Pain Assessment  2/12/2020   Location of Pain Ankle   Location Modifiers Left   Severity of Pain 0   Quality of Pain -   Duration of Pain -   Frequency of Pain -   Aggravating Factors -   Limiting Behavior -   Relieving Factors -   Result of Injury -         PHYSICAL EXAM:     Visit Vitals  /89 (BP 1 Location: Right arm, BP Patient Position: Sitting)   Pulse 82   Temp 95.1 °F (35.1 °C) (Oral)   Resp 16   Ht 5' 6\" (1.676 m)   Wt 213 lb (96.6 kg)   SpO2 99%   BMI 34.38 kg/m²         Pain Scale: 0 - No pain/10    Pain Location: Ankle      GEN:  Alert, well developed, well nourished, well appearing 47 y.o. female seated comfortably in no acute distress. Speech normal in context and clarity. Psychiatric: Affect, mood and conduct are appropriate.  Alert, oriented x 3 alert, oriented x 3, memory grossly intact, no dementia  Patient arrives to office via: with assistive device: walker   Patient accompanied in the examination room by: spouse    M/S EXAMINATION OF: left foot/ankle  DRESSINGS: CDI   DRAINAGE: none  INCISION: Incision looks good, skin well approximated, no dehiscence, nylon sutures in place without disruption. SKIN: mild edema, no erythema, no ecchymosis, no warmth    TENDERNESS:  mild tenderness to palpation   NEUROVASCULAR:  grossly intact. Positive distal pulses and capillary refill. DVT ASSESSMENT:  The calf is not tender to palpation. No evidence of DVT seen on physical exam.  ROM: not tested                  RADIOGRAPHS & DIAGNOSTIC STUDIES     None      IMPRESSION:     Encounter Diagnoses     ICD-10-CM ICD-9-CM   1. Achilles tendinitis of left lower extremity M76.62 726.71   2. Post-operative state Z98.890 V45.89       PLAN:       Orders Placed This Encounter    nitrofurantoin, macrocrystal-monohydrate, (MACROBID) 100 mg capsule     Sig: Take 1 Cap by mouth two (2) times a day for 7 days. Dispense:  14 Cap     Refill:  0                   · Suture removed by Dr. Doimngo Seymour and patient placed in short leg cast in gentle plantar flexion  · Return to the office in 3-4 weeks for cast removal and CAM boot placement with wedges  · Rx for Macrobid e-scribed to patients pharmacy    · Weight bearing status:  no weight bearing to the surgical extremity    · No lifting, twisting, squatting, deep bending, driving or strenuous activity. · Please follow up as instructed    · Please take medication as directed    · Follow RICE protocol (protecting skin)    · Maintain proper Nutrition    · Take a multivitamin, calcium + Vitamin D supplement daily (if tolerated)    · If you are a current smoker, quit or limit smoking    · Keep the current dressings on and in place. You need to keep this incision clean and dry. If you have a splint or cast on please keep this clean and dry as well.     · You should expect swelling and bruising in the area over the next several days. You may elevate the surgical extremity on 1 pillow. Place the pillow horizontal so that no pressure is on the back of your heel. You may apply an icepack on top of the dressing to help minimize the swelling. PLEASE SEEK IMMEDIATE ASSESSMENT BY ER PHYSICIAN IF ANY OF THE FOLLOWING EXIST:      Excessive pain, swelling, redness or odor of or around the surgical area    Temperature over 100.5    Nausea and vomiting lasting longer than 4 hours or if unable to take medications    Any signs of decreased circulation or nerve impairment to extremity: change in color, persistent numbness, tingling, coldness or increase pain    If any calf pain, calf tightness, shortness of breath, chest pain    Any difficulty breathing at rest or with ambulation, any chest tightness/soreness   Severe intractable pain, persistent swelling or drainage, development of a wound, incisional redness, finger/toe swelling or color changes, or CALF PAIN    · Perform ankle pumps with non-surgical/non-injured extremity to help reduce the risk of blood clots    · Keep all pets away from  any wound present in order to prevent infection. Patient has been consulted on with Dr. Jennifer Ferrara during this visit and he agrees with the assessment and plan    Patient expresses understanding of the plan. Patient education provided on post surgical care. REVIEW OF SYSTEMS:     Review of Systems: Chest pain: no  Shortness of breath: no  Fever: no  Night sweats: no  Weight loss: no  Constitutional signs: no  Review of all other systems is negative    Otherwise as noted in HPI      PAST MEDICAL HISTORY:     Past Medical History:   Diagnosis Date    [facility] 2013    right breast    [facility]     Diabetes ([facility]     Hypercholesterolemia     Hypertension     Insomnia     Obesity (BMI 30.0-34. 9)     UTI (urinary tract infection)        MEDICATIONS:     Current Outpatient Medications   Medication Sig    nitrofurantoin, macrocrystal-monohydrate, (MACROBID) 100 mg capsule Take 1 Cap by mouth two (2) times a day for 7 days.  rivaroxaban (XARELTO) 10 mg tablet Take 1 Tab by mouth daily.  losartan (COZAAR) 50 mg tablet Take 1 Tab by mouth daily.  furosemide (LASIX) 20 mg tablet Take 1 Tab by mouth daily as needed for Other (severe swelling).  metFORMIN (GLUCOPHAGE) 500 mg tablet Take 1 Tab by mouth two (2) times a day.  simvastatin (ZOCOR) 40 mg tablet Take 1 Tab by mouth nightly.  venlafaxine (EFFEXOR) 37.5 mg tablet Take 1 Tab by mouth daily.  [START ON 3/21/2020] zolpidem (AMBIEN) 10 mg tablet Take 1 Tab by mouth nightly as needed for Sleep. Max Daily Amount: 10 mg.    [START ON 3/21/2020] gabapentin (NEURONTIN) 100 mg capsule Take 1 Cap by mouth three (3) times daily. Max Daily Amount: 300 mg.  polyethylene glycol (MIRALAX) 17 gram packet Take 1 Packet by mouth daily. No current facility-administered medications for this visit.         ALLERGIES:     Allergies   Allergen Reactions    Aspirin Swelling    Codeine Swelling    Penicillins Swelling         PAST SURGICAL HISTORY:     Past Surgical History:   Procedure Laterality Date    HX CARPAL TUNNEL RELEASE Bilateral     HX  SECTION      HX CHOLECYSTECTOMY      HX HYSTERECTOMY      HX KNEE ARTHROSCOPY Bilateral     HX MASTECTOMY Right 2013    HX TUBAL LIGATION         SOCIAL HISTORY:     Social History     Socioeconomic History    Marital status:      Spouse name: Not on file    Number of children: Not on file    Years of education: Not on file    Highest education level: Not on file   Occupational History    Not on file   Social Needs    Financial resource strain: Not on file    Food insecurity:     Worry: Not on file     Inability: Not on file    Transportation needs:     Medical: Not on file     Non-medical: Not on file   Tobacco Use    Smoking status: Current Every Day Smoker     Packs/day: 1.00    Smokeless tobacco: Never Used   Substance and Sexual Activity    Alcohol use: Yes     Frequency: Never     Comment: occassionl    Drug use: No    Sexual activity: Not Currently     Partners: Male   Lifestyle    Physical activity:     Days per week: Not on file     Minutes per session: Not on file    Stress: Not on file   Relationships    Social connections:     Talks on phone: Not on file     Gets together: Not on file     Attends Anabaptist service: Not on file     Active member of club or organization: Not on file     Attends meetings of clubs or organizations: Not on file     Relationship status: Not on file    Intimate partner violence:     Fear of current or ex partner: Not on file     Emotionally abused: Not on file     Physically abused: Not on file     Forced sexual activity: Not on file   Other Topics Concern    Not on file   Social History Narrative    Not on file       FAMILY HISTORY:     Family History   Problem Relation Age of Onset    Cancer Mother     COPD Mother     Alcohol abuse Father     Diabetes Father     Hypertension Father     Heart Disease Father     Elevated Lipids Father     Hypertension Brother     Hypertension Maternal Grandmother     Hypertension Maternal Grandfather            Eneida Dixon PA-C  2/12/2020

## 2020-03-10 ENCOUNTER — OFFICE VISIT (OUTPATIENT)
Dept: ORTHOPEDIC SURGERY | Age: 55
End: 2020-03-10

## 2020-03-10 VITALS
SYSTOLIC BLOOD PRESSURE: 132 MMHG | TEMPERATURE: 95.7 F | HEART RATE: 77 BPM | BODY MASS INDEX: 34.23 KG/M2 | HEIGHT: 66 IN | OXYGEN SATURATION: 98 % | WEIGHT: 213 LBS | RESPIRATION RATE: 16 BRPM | DIASTOLIC BLOOD PRESSURE: 81 MMHG

## 2020-03-10 DIAGNOSIS — M76.62 ACHILLES TENDINITIS OF LEFT LOWER EXTREMITY: Primary | ICD-10-CM

## 2020-03-10 DIAGNOSIS — Z98.890 POST-OPERATIVE STATE: ICD-10-CM

## 2020-03-10 NOTE — PATIENT INSTRUCTIONS
· You may shower, NO soaking and no submerging in ANY water (no bath tubs, pools, hot tubs, oceans, etc) · Pat dry after shower, no aggressive scrubbing or manipulation of the incision · Follow up on 3/18/20 · CAM boot placement with wedges - remove one wedge every 3-4 days · Weight bearing status:  no weight bearing to the surgical extremity · No lifting, twisting, squatting, deep bending, driving or strenuous activity. · Please follow up as instructed · Please take medication as directed · Follow RICE protocol (protecting skin) · Maintain proper Nutrition · Take a multivitamin, calcium + Vitamin D supplement daily (if tolerated) · If you are a current smoker, quit or limit smoking · Keep the current dressings on and in place. You need to keep this incision clean and dry. If you have a splint or cast on please keep this clean and dry as well. · You should expect swelling and bruising in the area over the next several days. You may elevate the surgical extremity on 1 pillow. Place the pillow horizontal so that no pressure is on the back of your heel. You may apply an icepack on top of the dressing to help minimize the swelling. PLEASE SEEK IMMEDIATE ASSESSMENT BY ER PHYSICIAN IF ANY OF THE FOLLOWING EXIST:  
 
? Excessive pain, swelling, redness or odor of or around the surgical area ? Temperature over 100.5 ? Nausea and vomiting lasting longer than 4 hours or if unable to take medications ? Any signs of decreased circulation or nerve impairment to extremity: change in color, persistent numbness, tingling, coldness or increase pain ? If any calf pain, calf tightness, shortness of breath, chest pain ? Any difficulty breathing at rest or with ambulation, any chest tightness/soreness ?  Severe intractable pain, persistent swelling or drainage, development of a wound, incisional redness, finger/toe swelling or color changes, or CALF PAIN 
 
 · Perform ankle pumps with non-surgical/non-injured extremity to help reduce the risk of blood clots · Keep all pets away from  any wound present in order to prevent infection.

## 2020-03-10 NOTE — PROGRESS NOTES
Patient: Michelle Go                MRN: [id number]       SSN: [id number]  YOB: 1965                     AGE: 47 y.o. SEX: female    Lita Manzanares MD      Chief Complaint:   Chief Complaint   Patient presents with    Foot Pain     left achilles       DOS: 1/24/20      HPI:     The patient is a 47 y.o. female who presents today for follow up 46 days s/p:     Sharp Excisional Debridement And Repair Of Left Achilles Tendon, Retrocalcaneal Bursectomy, Calcaneal Ostecotomy - Left. Since we last saw the patient, she has been NWB to the left lower extremity. Patient denies any fever, chills, chest pain, shortness of breath or calf pain. There are no new illness or injuries to report since last seen in the office. Patient is on Xarelto for DVT prophylaxis. Constipation has not been a concern. No changes in medications, allergies, social or family history. Patient reports doing well other than her pain assessment indicators as listed below. Pain Assessment  3/10/2020   Location of Pain Ankle   Location Modifiers Left   Severity of Pain 0   Quality of Pain -   Duration of Pain -   Frequency of Pain -   Aggravating Factors -   Limiting Behavior -   Relieving Factors -   Result of Injury -         PHYSICAL EXAM:     Visit Vitals  /81 (BP 1 Location: Left arm, BP Patient Position: Sitting)   Pulse 77   Temp 95.7 °F (35.4 °C) (Oral)   Resp 16   Ht 5' 6\" (1.676 m)   Wt 213 lb (96.6 kg)   SpO2 98%   BMI 34.38 kg/m²         Pain Scale: 0 - No pain/10    Pain Location: Ankle      GEN:  Alert, well developed, well nourished, well appearing 47 y.o. female seated comfortably in no acute distress. Speech normal in context and clarity. Psychiatric: Affect, mood and conduct are appropriate.  Alert, oriented x 3 alert, oriented x 3, memory grossly intact, no dementia  Patient arrives to office via: with assistive device: walker   Patient accompanied in the examination room by: spouse    M/S EXAMINATION OF: left foot/ankle  DRESSINGS: CDI   DRAINAGE: none  INCISION: Incision looks good, skin well approximated, no dehiscence, steri strips in place  SKIN: mild edema, no erythema, no ecchymosis, no warmth, dry skin  TENDERNESS:  mild tenderness to palpation   NEUROVASCULAR:  grossly intact. Positive distal pulses and capillary refill. DVT ASSESSMENT:  The calf is not tender to palpation. No evidence of DVT seen on physical exam.  ROM: limited                  RADIOGRAPHS & DIAGNOSTIC STUDIES     None      IMPRESSION:     Encounter Diagnoses     ICD-10-CM ICD-9-CM   1. Achilles tendinitis of left lower extremity M76.62 726.71   2. Post-operative state Z98.890 V45.89       PLAN:       Orders Placed This Encounter    Generic Supply Order     Tall CAM boot, left    Generic Supply Order     wedges                   · You may shower, NO soaking and no submerging in ANY water (no bath tubs, pools, hot tubs, oceans, etc)  · Pat dry after shower, no aggressive scrubbing or manipulation of the incision    · Follow up on 3/18/20    · CAM boot placement with wedges - remove one wedge every 3-4 days    · Weight bearing status:  no weight bearing to the surgical extremity    · No lifting, twisting, squatting, deep bending, driving or strenuous activity. · Please follow up as instructed    · Please take medication as directed    · Follow RICE protocol (protecting skin)    · Maintain proper Nutrition    · Take a multivitamin, calcium + Vitamin D supplement daily (if tolerated)    · If you are a current smoker, quit or limit smoking    · Keep the current dressings on and in place. You need to keep this incision clean and dry. If you have a splint or cast on please keep this clean and dry as well. · You should expect swelling and bruising in the area over the next several days. You may elevate the surgical extremity on 1 pillow.  Place the pillow horizontal so that no pressure is on the back of your heel. You may apply an icepack on top of the dressing to help minimize the swelling. PLEASE SEEK IMMEDIATE ASSESSMENT BY ER PHYSICIAN IF ANY OF THE FOLLOWING EXIST:      Excessive pain, swelling, redness or odor of or around the surgical area    Temperature over 100.5    Nausea and vomiting lasting longer than 4 hours or if unable to take medications    Any signs of decreased circulation or nerve impairment to extremity: change in color, persistent numbness, tingling, coldness or increase pain    If any calf pain, calf tightness, shortness of breath, chest pain    Any difficulty breathing at rest or with ambulation, any chest tightness/soreness   Severe intractable pain, persistent swelling or drainage, development of a wound, incisional redness, finger/toe swelling or color changes, or CALF PAIN    · Perform ankle pumps with non-surgical/non-injured extremity to help reduce the risk of blood clots    · Keep all pets away from  any wound present in order to prevent infection. Dr. Mauro Talamantes was consulted during this visit and he agrees with the assessment and plan    Patient expresses understanding of the plan. Patient education provided on post surgical care. REVIEW OF SYSTEMS:     Review of Systems: Chest pain: no  Shortness of breath: no  Fever: no  Night sweats: no  Weight loss: no  Constitutional signs: no  Review of all other systems is negative    Otherwise as noted in HPI      PAST MEDICAL HISTORY:     Past Medical History:   Diagnosis Date    Breast cancer ([facility] 2013    right breast    Breast cancer ([facility]     Diabetes ([facility]     Hypercholesterolemia     Hypertension     Insomnia     Obesity (BMI 30.0-34. 9)     UTI (urinary tract infection)        MEDICATIONS:     Current Outpatient Medications   Medication Sig    rivaroxaban (XARELTO) 10 mg tablet Take 1 Tab by mouth daily.  losartan (COZAAR) 50 mg tablet Take 1 Tab by mouth daily.     furosemide (LASIX) 20 mg tablet Take 1 Tab by mouth daily as needed for Other (severe swelling).  metFORMIN (GLUCOPHAGE) 500 mg tablet Take 1 Tab by mouth two (2) times a day.  simvastatin (ZOCOR) 40 mg tablet Take 1 Tab by mouth nightly.  venlafaxine (EFFEXOR) 37.5 mg tablet Take 1 Tab by mouth daily.  [START ON 3/21/2020] zolpidem (AMBIEN) 10 mg tablet Take 1 Tab by mouth nightly as needed for Sleep. Max Daily Amount: 10 mg.    [START ON 3/21/2020] gabapentin (NEURONTIN) 100 mg capsule Take 1 Cap by mouth three (3) times daily. Max Daily Amount: 300 mg.  polyethylene glycol (MIRALAX) 17 gram packet Take 1 Packet by mouth daily. No current facility-administered medications for this visit.         ALLERGIES:     Allergies   Allergen Reactions    Aspirin Swelling    Codeine Swelling    Penicillins Swelling         PAST SURGICAL HISTORY:     Past Surgical History:   Procedure Laterality Date    HX CARPAL TUNNEL RELEASE Bilateral     HX  SECTION      HX CHOLECYSTECTOMY      HX HYSTERECTOMY      HX KNEE ARTHROSCOPY Bilateral     HX MASTECTOMY Right 2013    HX TUBAL LIGATION         SOCIAL HISTORY:     Social History     Socioeconomic History    Marital status:      Spouse name: Not on file    Number of children: Not on file    Years of education: Not on file    Highest education level: Not on file   Occupational History    Not on file   Social Needs    Financial resource strain: Not on file    Food insecurity:     Worry: Not on file     Inability: Not on file    Transportation needs:     Medical: Not on file     Non-medical: Not on file   Tobacco Use    Smoking status: Current Every Day Smoker     Packs/day: 1.00    Smokeless tobacco: Never Used   Substance and Sexual Activity    Alcohol use: Yes     Frequency: Never     Comment: occassionl    Drug use: No    Sexual activity: Not Currently     Partners: Male   Lifestyle    Physical activity:     Days per week: Not on file Minutes per session: Not on file    Stress: Not on file   Relationships    Social connections:     Talks on phone: Not on file     Gets together: Not on file     Attends Roman [employer] service: Not on file     Active member of club or organization: Not on file     Attends meetings of clubs or organizations: Not on file     Relationship status: Not on file    Intimate partner violence:     Fear of current or ex partner: Not on file     Emotionally abused: Not on file     Physically abused: Not on file     Forced sexual activity: Not on file   Other Topics Concern    Not on file   Social History Narrative    Not on file       FAMILY HISTORY:     Family History   Problem Relation Age of Onset    Cancer Mother     COPD Mother     Alcohol abuse Father     Diabetes Father     Hypertension Father     Heart Disease Father     Elevated Lipids Father     Hypertension Brother     Hypertension Maternal Grandmother     Hypertension Maternal Grandfather            David Tejada PA-C  3/10/2020

## 2020-03-16 ENCOUNTER — DOCUMENTATION ONLY (OUTPATIENT)
Dept: ORTHOPEDIC SURGERY | Age: 55
End: 2020-03-16

## 2020-03-16 NOTE — PROGRESS NOTES
Harsh Brower \"[employer]" received via fax and placed in the forms bin at [facility] on 3/16/20

## 2020-03-17 ENCOUNTER — TELEPHONE (OUTPATIENT)
Dept: ORTHOPEDIC SURGERY | Age: 55
End: 2020-03-17

## 2020-03-18 ENCOUNTER — OFFICE VISIT (OUTPATIENT)
Dept: ORTHOPEDIC SURGERY | Age: 55
End: 2020-03-18

## 2020-03-18 ENCOUNTER — TELEPHONE (OUTPATIENT)
Dept: ORTHOPEDIC SURGERY | Age: 55
End: 2020-03-18

## 2020-03-18 ENCOUNTER — DOCUMENTATION ONLY (OUTPATIENT)
Dept: ORTHOPEDIC SURGERY | Age: 55
End: 2020-03-18

## 2020-03-18 VITALS
BODY MASS INDEX: 34.23 KG/M2 | OXYGEN SATURATION: 99 % | RESPIRATION RATE: 15 BRPM | HEIGHT: 66 IN | HEART RATE: 70 BPM | SYSTOLIC BLOOD PRESSURE: 149 MMHG | TEMPERATURE: 96 F | DIASTOLIC BLOOD PRESSURE: 86 MMHG | WEIGHT: 213 LBS

## 2020-03-18 DIAGNOSIS — Z98.890 POST-OPERATIVE STATE: ICD-10-CM

## 2020-03-18 DIAGNOSIS — M76.62 ACHILLES TENDINITIS OF LEFT LOWER EXTREMITY: Primary | ICD-10-CM

## 2020-03-18 NOTE — TELEPHONE ENCOUNTER
[phone] Patient     Patient asking if she is supposed to keep the wedge in or take it out. Please advise.

## 2020-03-18 NOTE — LETTER
NOTIFICATION RETURN TO WORK / SCHOOL 
 
3/18/2020 9:01 AM 
 
Ms. Yousuf Villarreal [address] [facility] [place] [id number] To Whom It May Concern: 
 
Yousuf Villarreal is currently under the care of [address] [place] [address] Song Monique. She will remain out of work for the next 3 weeks. She will be re evaluated at that time. If there are questions or concerns please have the patient contact our office. Sincerely, Kiki Harmon PA-C

## 2020-03-18 NOTE — TELEPHONE ENCOUNTER
Per Dr. Barak Hauser, she can take the wedges out of her boot now.  Called and spoke to patient and info was given

## 2020-03-18 NOTE — PROGRESS NOTES
Patient: Yousuf Villarreal                MRN: [id number]       SSN: [id number]  YOB: 1965        AGE: 47 y.o. SEX: female    PCP:Julian Vivas MD    POST OP OFFICE NOTE  DOS:  47 days ago    Chief Complaint:   Chief Complaint   Patient presents with    Ankle Pain     left ankle pain       HPI:     The patient is a 47 y.o. female who presents today for follow up 54 days s/p left achilles tesnon reconstruction/spur removal . Patient has been NWB to the right lower extremity. Patient reports doing well other than // no pain . Patient denies any fever, chills, chest pain, shortness of breath or calf pain. There are no new illness or injuries to report since last seen in the office. Patient is on [id number]  for DVT prophylaxis. PHYSICAL EXAM:     Visit Vitals  /86 (BP 1 Location: Left arm, BP Patient Position: Sitting)   Pulse 70   Temp 96 °F (35.6 °C) (Oral)   Resp 15   Ht 5' 6\" (1.676 m)   Wt 213 lb (96.6 kg)   SpO2 99%   BMI 34.38 kg/m²       GEN:  Alert, well developed, well nourished, well appearing 47 y.o. female in no acute distress. PSYCH:  Normal affect, mood, and conduct. alert, oriented x 3 alert, oriented x 3, no dementia  M/S EXAMINATION OF: right    DRESSINGS: CDI  DRAINAGE: none  INCISION: Incision looks good, skin well approximated, no dehiscence,      SKIN: mild edema , no erythema, no  ecchymosis, no warmth    TENDERNESS:  no tenderness to palpation (as expected after surgery)  NEUROVASCULAR:  grossly intact. Positive distal pulses and capillary refill. DVT ASSESSMENT:  The calf is not tender to palpation. No evidence of DVT seen on physical exam.  ROM: not tested     RADIOGRAPHS & DIAGNOSTIC STUDIES     No results found for any visits on 03/18/20. IMPRESSION:     Encounter Diagnoses     ICD-10-CM ICD-9-CM   1. Achilles tendinitis of left lower extremity M76.62 726.71   2.  Post-operative state Z98.890 V45.89       PLAN:         No orders of the defined types were placed in this encounter.               There are no Patient Instructions on file for this visit.        · Dressing: no in place as she is healed    · Keep the current dressings on and in place. You need to keep this incision clean and dry. If you have a splint or cast on please keep this clean and dry as well. · You should expect swelling and bruising in the area over the next several days. You may elevate the right extremity on 1 pillow. Place the pillow horizontal so that no pressure is on the back of your heel. You may apply an icepack on top of the dressing to help minimize the swelling. · Keep all pets away from  any wound present in order to prevent infection. · Continue Activity modification    · Weight bearing status: WBAT    weight bearing to the right lower extremity    · No lifting, twisting, squatting, deep bending, driving or strenuous activity. PLEASE SEEK IMMEDIATE ASSESSMENT BY ER PHYSICIAN IF ANY OF THE FOLLOWING EXIST:     Excessive pain, swelling, redness or odor of or around the surgical area   Temperature over 100.5   Nausea and vomiting lasting longer than 4 hours or if unable to take medications   Any signs of decreased circulation or nerve impairment to extremity: change in color, persistent numbness, tingling, coldness or increase pain   If any calf pain, calf tightness, shortness of breath, chest pain   Any difficulty breathing at rest or with ambulation, any chest tightness/soreness  Severe intractable pain, persistent swelling or drainage, development of a wound, incisional redness, finger/toe swelling or color changes, or CALF PAIN    · Perform ankle pumps with non-surgical/non-injured extremity to help reduce the risk of blood clots    · Please follow up in 3 weeks                  Parveenmayra Altagracia presents today for post operative follow up and pain that is secondary to post operative state. Since surgery, the patient's pain has improved.   She describes the pain as no pain. Since surgery, the patient is not able to perform normal daily activities. She reports the following adverse side effects from treatment: none. REVIEW OF SYSTEMS:     Otherwise as noted in HPI      PAST MEDICAL HISTORY:     Past Medical History:   Diagnosis Date    Breast cancer ([facility] 2013    right breast    Breast cancer ([facility]     Diabetes ([facility]     Hypercholesterolemia     Hypertension     Insomnia     Obesity (BMI 30.0-34. 9)     UTI (urinary tract infection)        MEDICATIONS:     Current Outpatient Medications   Medication Sig    rivaroxaban (XARELTO) 10 mg tablet Take 1 Tab by mouth daily.  losartan (COZAAR) 50 mg tablet Take 1 Tab by mouth daily.  furosemide (LASIX) 20 mg tablet Take 1 Tab by mouth daily as needed for Other (severe swelling).  metFORMIN (GLUCOPHAGE) 500 mg tablet Take 1 Tab by mouth two (2) times a day.  simvastatin (ZOCOR) 40 mg tablet Take 1 Tab by mouth nightly.  venlafaxine (EFFEXOR) 37.5 mg tablet Take 1 Tab by mouth daily.  [START ON 3/21/2020] zolpidem (AMBIEN) 10 mg tablet Take 1 Tab by mouth nightly as needed for Sleep. Max Daily Amount: 10 mg.    [START ON 3/21/2020] gabapentin (NEURONTIN) 100 mg capsule Take 1 Cap by mouth three (3) times daily. Max Daily Amount: 300 mg.  polyethylene glycol (MIRALAX) 17 gram packet Take 1 Packet by mouth daily. No current facility-administered medications for this visit.         ALLERGIES:     Allergies   Allergen Reactions    Aspirin Swelling    Codeine Swelling    Penicillins Swelling         PAST SURGICAL HISTORY:     Past Surgical History:   Procedure Laterality Date    HX CARPAL TUNNEL RELEASE Bilateral     HX  SECTION      HX CHOLECYSTECTOMY      HX HYSTERECTOMY      HX KNEE ARTHROSCOPY Bilateral     HX MASTECTOMY Right 2013    HX TUBAL LIGATION         SOCIAL HISTORY:     Social History     Socioeconomic History    Marital status:      Spouse name: Not on file  Number of children: Not on file    Years of education: Not on file    Highest education level: Not on file   Occupational History    Not on file   Social Needs    Financial resource strain: Not on file    Food insecurity     Worry: Not on file     Inability: Not on file    Transportation needs     Medical: Not on file     Non-medical: Not on file   Tobacco Use    Smoking status: Current Every Day Smoker     Packs/day: 1.00    Smokeless tobacco: Never Used   Substance and Sexual Activity    Alcohol use: Yes     Frequency: Never     Comment: occassionl    Drug use: No    Sexual activity: Not Currently     Partners: Male   Lifestyle    Physical activity     Days per week: Not on file     Minutes per session: Not on file    Stress: Not on file   Relationships    Social connections     Talks on phone: Not on file     Gets together: Not on file     Attends [employer] service: Not on file     Active member of club or organization: Not on file     Attends meetings of clubs or organizations: Not on file     Relationship status: Not on file    Intimate partner violence     Fear of current or ex partner: Not on file     Emotionally abused: Not on file     Physically abused: Not on file     Forced sexual activity: Not on file   Other Topics Concern    Not on file   Social History Narrative    Not on file       FAMILY HISTORY:     Family History   Problem Relation Age of Onset    Cancer Mother     COPD Mother     Alcohol abuse Father     Diabetes Father     Hypertension Father     Heart Disease Father     Elevated Lipids Father     Hypertension Brother     Hypertension Maternal Grandmother     Hypertension Maternal Grandfather            Bertin Whitmore MD  3/18/2020

## 2020-04-07 ENCOUNTER — VIRTUAL VISIT (OUTPATIENT)
Dept: FAMILY MEDICINE CLINIC | Age: 55
End: 2020-04-07

## 2020-04-07 DIAGNOSIS — Z85.3 HISTORY OF RIGHT BREAST CANCER: ICD-10-CM

## 2020-04-07 DIAGNOSIS — E11.9 TYPE 2 DIABETES MELLITUS WITHOUT COMPLICATION, WITHOUT LONG-TERM CURRENT USE OF INSULIN (HCC): ICD-10-CM

## 2020-04-07 DIAGNOSIS — R23.2 HOT FLASHES: ICD-10-CM

## 2020-04-07 DIAGNOSIS — E78.00 HYPERCHOLESTEROLEMIA: ICD-10-CM

## 2020-04-07 DIAGNOSIS — I10 ESSENTIAL HYPERTENSION: Primary | ICD-10-CM

## 2020-04-07 DIAGNOSIS — G47.00 INSOMNIA, UNSPECIFIED TYPE: ICD-10-CM

## 2020-04-07 DIAGNOSIS — K59.00 CONSTIPATION, UNSPECIFIED CONSTIPATION TYPE: ICD-10-CM

## 2020-04-07 RX ORDER — SIMVASTATIN 40 MG/1
40 TABLET, FILM COATED ORAL
Qty: 90 TAB | Refills: 1 | Status: SHIPPED | OUTPATIENT
Start: 2020-04-07 | End: 2020-08-03 | Stop reason: SDUPTHER

## 2020-04-07 RX ORDER — VENLAFAXINE 37.5 MG/1
37.5 TABLET ORAL DAILY
Qty: 90 TAB | Refills: 1 | Status: SHIPPED | OUTPATIENT
Start: 2020-04-07 | End: 2020-06-01 | Stop reason: SDUPTHER

## 2020-04-07 RX ORDER — LOSARTAN POTASSIUM 25 MG/1
25 TABLET ORAL DAILY
Qty: 90 TAB | Refills: 1 | Status: SHIPPED | OUTPATIENT
Start: 2020-04-07 | End: 2020-08-03 | Stop reason: SDUPTHER

## 2020-04-07 RX ORDER — METFORMIN HYDROCHLORIDE 500 MG/1
500 TABLET ORAL 2 TIMES DAILY
Qty: 180 TAB | Refills: 1 | Status: SHIPPED | OUTPATIENT
Start: 2020-04-07 | End: 2020-08-03 | Stop reason: SDUPTHER

## 2020-04-07 RX ORDER — ZOLPIDEM TARTRATE 10 MG/1
10 TABLET ORAL
Qty: 30 TAB | Refills: 2 | Status: SHIPPED | OUTPATIENT
Start: 2020-04-07 | End: 2020-08-03

## 2020-04-07 RX ORDER — GABAPENTIN 100 MG/1
100 CAPSULE ORAL 3 TIMES DAILY
Qty: 90 CAP | Refills: 2 | Status: SHIPPED | OUTPATIENT
Start: 2020-04-07 | End: 2020-08-03 | Stop reason: SDUPTHER

## 2020-04-07 RX ORDER — POLYETHYLENE GLYCOL 3350 17 G/17G
17 POWDER, FOR SOLUTION ORAL DAILY
Qty: 10 PACKET | Refills: 1 | Status: SHIPPED | OUTPATIENT
Start: 2020-04-07 | End: 2020-08-03

## 2020-04-07 NOTE — PROGRESS NOTES
Consent: Charlene Patel, who was seen by synchronous (real-time) audio-video technology, and/or her healthcare decision maker, is aware that this patient-initiated, Telehealth encounter on 4/7/2020 is a billable service, with coverage as determined by her insurance carrier. She is aware that she may receive a bill and has provided verbal consent to proceed: Yes. Assessment & Plan:       ICD-10-CM ICD-9-CM    1. Essential hypertension I10 401.9 losartan (COZAAR) 25 mg tablet   2. Insomnia, unspecified type G47.00 780.52 zolpidem (Ambien) 10 mg tablet   3. Hot flashes R23.2 782.62 venlafaxine (EFFEXOR) 37.5 mg tablet   4. Hypercholesterolemia E78.00 272.0 simvastatin (ZOCOR) 40 mg tablet   5. Type 2 diabetes mellitus without complication, without long-term current use of insulin (HCC) E11.9 250.00 metFORMIN (GLUCOPHAGE) 500 mg tablet   6. History of right breast cancer Z85.3 V10.3 gabapentin (NEURONTIN) 100 mg capsule   7. Constipation, unspecified constipation type K59.00 564.00 polyethylene glycol (Miralax) 17 gram packet   712  Subjective:   Charlene Patel is a 54 y.o. female who was seen for Hypertension and Cholesterol Problem    Patient has hypertension. She has taken losartan 50 mg daily. Most recently her blood pressure has been low. It was 78/40 and she felt diaphoretic and weak. She did rest and take some fluid and later on it did go up. She did not have a fever or chills at the time. Would like to go down on her blood pressure medication. We will cut back to losartan 25 mg daily. I will send in a prescription and she will keep a blood pressure log. She will follow-up at next visit. She denies headache, changes in vision or focal weakness. Patient has dyslipidemia. She takes simvastatin. We will recheck lipid panel at next visit. She will continue with the medication. He does need a refill of the same. Prescription is sent in. Patient has insomnia. She is on Ambien.   Takes 10 mg daily.  She would like a refill of medication. Patient has a history of breast cancer. She did undergo chemotherapy. 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 Does need a refill of medication. Prescription will be sent in. Patient has a history of mood disorder and takes Effexor. She is stable on the medication. Would like a refill of the same. Prescription will be sent in. Patient has constipation on and off and uses MiraLAX daily. No blood in stools. No dark stools. I will send in a refill of medication. Diabetes mellitus: Patient has a history of diabetes mellitus. She does need to come in and get her labs checked. I will place a request for the same. In the meantime she will continue with the current treatment plan. Her blood glucose numbers have been stable. Her last HbA1c was 7.3. She does need to get a foot exam done. We will do this at next visit. Health maintenance: Patient needs to have a mammogram and colonoscopy done. Prior to Admission medications    Medication Sig Start Date End Date Taking? Authorizing Provider   zolpidem (Ambien) 10 mg tablet Take 1 Tab by mouth nightly as needed for Sleep. Max Daily Amount: 10 mg. 4/7/20  Yes Marcy Sandy MD   venlafaxine (EFFEXOR) 37.5 mg tablet Take 1 Tab by mouth daily. 4/7/20  Yes Julian Vivas MD   simvastatin (ZOCOR) 40 mg tablet Take 1 Tab by mouth nightly. 4/7/20  Yes Marcy Sandy MD   polyethylene glycol (Miralax) 17 gram packet Take 1 Packet by mouth daily. 4/7/20  Yes Marcy Sandy MD   metFORMIN (GLUCOPHAGE) 500 mg tablet Take 1 Tab by mouth two (2) times a day. 4/7/20  Yes Julian Vivas MD   gabapentin (NEURONTIN) 100 mg capsule Take 1 Cap by mouth three (3) times daily. Max Daily Amount: 300 mg. 4/7/20  Yes Marcy Sandy MD   losartan (COZAAR) 25 mg tablet Take 1 Tab by mouth daily.  4/7/20  Yes Julian Vivas MD   rivaroxaban (XARELTO) 10 mg tablet Take 1 Tab by mouth daily. 1/24/20   Sarah Fischer PA   furosemide (LASIX) 20 mg tablet Take 1 Tab by mouth daily as needed for Other (severe swelling). 1/21/20   Julian Vivas MD   losartan (COZAAR) 50 mg tablet Take 1 Tab by mouth daily. 1/21/20 4/7/20  Julian Vivas MD   metFORMIN (GLUCOPHAGE) 500 mg tablet Take 1 Tab by mouth two (2) times a day. 1/21/20 4/7/20  Julian Vivas MD   simvastatin (ZOCOR) 40 mg tablet Take 1 Tab by mouth nightly. 1/21/20 4/7/20  Julian Vivas MD   venlafaxine (EFFEXOR) 37.5 mg tablet Take 1 Tab by mouth daily. 1/21/20 4/7/20  Julian Vivas MD   zolpidem (AMBIEN) 10 mg tablet Take 1 Tab by mouth nightly as needed for Sleep. Max Daily Amount: 10 mg. 3/21/20 4/7/20  Julian Vivas MD   gabapentin (NEURONTIN) 100 mg capsule Take 1 Cap by mouth three (3) times daily. Max Daily Amount: 300 mg. 3/21/20 4/7/20  Julian Vivas MD   polyethylene glycol (MIRALAX) 17 gram packet Take 1 Packet by mouth daily. 1/21/20 4/7/20  Chikis Barajas PA-C     Allergies   Allergen Reactions    Aspirin Swelling    Codeine Swelling    Penicillins Swelling   ROS  Review of all systems is negative except as noted above in the HPI. Objective:   Vital Signs: (As obtained by patient/caregiver at home)  There were no vitals taken for this visit. Constitutional: [x] Appears well-developed and well-nourished [x] No apparent distress      Mental status: [x] Alert and awake  [x] Oriented to person/place/time [x] Able to follow commands      Pulmonary/Chest: [x] Respiratory effort normal   [x] No visualized signs of difficulty breathing or respiratory distress        Neurological:        [x] No Facial Asymmetry            Psychiatric:       [x] Normal Affect    We discussed the expected course, resolution and complications of the diagnosis(es) in detail. Medication risks, benefits, costs, interactions, and alternatives were discussed as indicated.   I advised her to contact the office if her condition worsens, changes or fails to improve as anticipated. She expressed understanding with the diagnosis(es) and plan. Obinna Sapp is a 54 y.o. female being evaluated by a video visit encounter for concerns as above. A caregiver was present when appropriate. Due to this being a TeleHealth encounter (During JSZJH-43 public health emergency), evaluation of the following organ systems was limited: Vitals/Constitutional/EENT/Resp/CV/GI//MS/Neuro/Skin/Heme-Lymph-Imm. Pursuant to the emergency declaration under the [facility], [facility] waiver authority and the [employer] and [employer] Act, this Virtual  Visit was conducted, with patient's (and/or legal guardian's) consent, to reduce the patient's risk of exposure to COVID-19 and provide necessary medical care. Services were provided through a video synchronous discussion virtually to substitute for in-person clinic visit. Patient was located at their individual homes. Provider was in the office.         Jolie Courtney MD

## 2020-04-08 ENCOUNTER — VIRTUAL VISIT (OUTPATIENT)
Dept: ORTHOPEDIC SURGERY | Age: 55
End: 2020-04-08

## 2020-04-08 DIAGNOSIS — M76.62 ACHILLES TENDINITIS OF LEFT LOWER EXTREMITY: Primary | ICD-10-CM

## 2020-04-08 DIAGNOSIS — Z98.890 POST-OPERATIVE STATE: ICD-10-CM

## 2020-04-08 NOTE — PROGRESS NOTES
Patient: Kaye Delacruz             MRN: [id number]     SSN: [id number] There is no height or weight on file to calculate BMI. YOB: 1965     AGE: 54 y.o. EX: female    PCP: Leti Reeves MD          Kaye Delacruz is a 54 y.o. female who was seen by VIRTUAL synchronous (real-time) audio-video technology on 4/8/2020. (DOXI[employer]ME). The location of this virtual encounter was performed at : [id number] [place] [address],[address]. [facility] OFFICE, Patient Location is at their home    CPT Codes 74622-01031 for Established Patients may apply to this Telehealth Visit  Time-based coding, delete if not needed: I spent at least 15 minutes with this established patient, and >50% of the time was spent counseling and/or coordinating care regarding      Virtual time was[de-identified]  9:23 am to 9:30 am.            ASSESSMENT AND PLAN       ICD-10-CM ICD-9-CM    1. Achilles tendinitis of left lower extremity M76.62 726.71    2. Post-operative state Z98.890 V45.89         Orders placed this encounter: No orders of the defined types were placed in this encounter. Plan    Patient is actually doing quite well. She is admits to having no pain. It is to be recalled she had an Achilles tendon surgery  On the left left side. She is  Walk  Functioning quite well admits to having no pain at all this current time. Plan is to have her walk inside her home without her cam walker boot. Return to work Monday, 4/13/2020: Use a cam walker boot at work: She does work as a dialysis nurse. 4-week follow-up for any worsening of baseline condition which we will see her sooner. Her daughter works at [employer]. We will give her a short medium sized cam walker boot for which she can wear wear and weight-bear as having this cam walker boot rather than wearing the tall boot.        SUBJECTIVE      Kaye Delacruz was seen for Foot Pain (left)       Since my last OV with Kaye Delacruz, she states that she is doing better overall, is less pain/discomfort, having no pain/discomfort, standing/walking short time duration or distances, standing/walking less time duration or distances, remains non weight bearing at this time. Mitzi Farrar has been seen for : above Dx is listed above    Mitzi Farrar is not taking medications for pain relief[de-identified]     .    Denies: CP, SOB, ABD PAIN, Calf pain         COLTEN TRIAGE QUESTIONNAIRE     Since your last office visit, have you had any:    1. New medical diagnoses No  2. Changes in your medications  No  3. Surgical procedures No  4. Changes in your Allergies to medication No  5. What is your pain level today 0/10  6. Changes in: PMH, SH, ROS: No          CHART REVIEW        Current Outpatient Medications   Medication Sig    zolpidem (Ambien) 10 mg tablet Take 1 Tab by mouth nightly as needed for Sleep. Max Daily Amount: 10 mg.    venlafaxine (EFFEXOR) 37.5 mg tablet Take 1 Tab by mouth daily.  simvastatin (ZOCOR) 40 mg tablet Take 1 Tab by mouth nightly.  polyethylene glycol (Miralax) 17 gram packet Take 1 Packet by mouth daily.  metFORMIN (GLUCOPHAGE) 500 mg tablet Take 1 Tab by mouth two (2) times a day.  gabapentin (NEURONTIN) 100 mg capsule Take 1 Cap by mouth three (3) times daily. Max Daily Amount: 300 mg.    losartan (COZAAR) 25 mg tablet Take 1 Tab by mouth daily.  rivaroxaban (XARELTO) 10 mg tablet Take 1 Tab by mouth daily.  furosemide (LASIX) 20 mg tablet Take 1 Tab by mouth daily as needed for Other (severe swelling). No current facility-administered medications for this visit. THE  FOR Joshua Luna MD 4/8/2020 .       Social History     Occupational History    Not on file   Tobacco Use    Smoking status: Current Every Day Smoker     Packs/day: 1.00    Smokeless tobacco: Never Used   Substance and Sexual Activity    Alcohol use: Yes     Frequency: Never     Comment: occassionl    Drug use: No    Sexual activity: Not Currently     Partners: Male        Prior to Admission medications    Medication Sig Start Date End Date Taking? Authorizing Provider   zolpidem (Ambien) 10 mg tablet Take 1 Tab by mouth nightly as needed for Sleep. Max Daily Amount: 10 mg. 20  Yes Joey Triplett MD   venlafaxine (EFFEXOR) 37.5 mg tablet Take 1 Tab by mouth daily. 20  Yes Julian Vivas MD   simvastatin (ZOCOR) 40 mg tablet Take 1 Tab by mouth nightly. 20  Yes Joey Triplett MD   polyethylene glycol (Miralax) 17 gram packet Take 1 Packet by mouth daily. 20  Yes Joey Triplett MD   metFORMIN (GLUCOPHAGE) 500 mg tablet Take 1 Tab by mouth two (2) times a day. 20  Yes Julian Vivas MD   gabapentin (NEURONTIN) 100 mg capsule Take 1 Cap by mouth three (3) times daily. Max Daily Amount: 300 mg. 20  Yes Joey Triplett MD   losartan (COZAAR) 25 mg tablet Take 1 Tab by mouth daily. 20  Yes Julian Vivas MD   rivaroxaban (XARELTO) 10 mg tablet Take 1 Tab by mouth daily. 20  Yes RANDI Patel   furosemide (LASIX) 20 mg tablet Take 1 Tab by mouth daily as needed for Other (severe swelling). 20  Yes Julian Vivas MD     Allergies   Allergen Reactions    Aspirin Swelling    Codeine Swelling    Penicillins Swelling        ROS    Past Medical History:   Diagnosis Date    Breast cancer ([facility] 2013    right breast    Breast cancer ([facility]     Diabetes ([facility]     Hypercholesterolemia     Hypertension     Insomnia     Obesity (BMI 30.0-34. 9)     UTI (urinary tract infection)      Past Surgical History:   Procedure Laterality Date    HX CARPAL TUNNEL RELEASE Bilateral     HX  SECTION      HX CHOLECYSTECTOMY      HX HYSTERECTOMY      HX KNEE ARTHROSCOPY Bilateral     HX MASTECTOMY Right 2013    HX TUBAL LIGATION       Social History     Socioeconomic History    Marital status:      Spouse name: Not on file    Number of children: Not on file    Years of education: Not on file    Highest education level: Not on file   Occupational History    Not on file   Social Needs    Financial resource strain: Not on file    Food insecurity     Worry: Not on file     Inability: Not on file    Transportation needs     Medical: Not on file     Non-medical: Not on file   Tobacco Use    Smoking status: Current Every Day Smoker     Packs/day: 1.00    Smokeless tobacco: Never Used   Substance and Sexual Activity    Alcohol use: Yes     Frequency: Never     Comment: occassionl    Drug use: No    Sexual activity: Not Currently     Partners: Male   Lifestyle    Physical activity     Days per week: Not on file     Minutes per session: Not on file    Stress: Not on file   Relationships    Social connections     Talks on phone: Not on file     Gets together: Not on file     Attends L[employer] service: Not on file     Active member of club or organization: Not on file     Attends meetings of clubs or organizations: Not on file     Relationship status: Not on file    Intimate partner violence     Fear of current or ex partner: Not on file     Emotionally abused: Not on file     Physically abused: Not on file     Forced sexual activity: Not on file   Other Topics Concern    Not on file   Social History Narrative    Not on file     Family History   Problem Relation Age of Onset    Cancer Mother     COPD Mother     Alcohol abuse Father     Diabetes Father     Hypertension Father     Heart Disease Father     Elevated Lipids Father     Hypertension Brother     Hypertension Maternal Grandmother     Hypertension Maternal Grandfather             OBJECTIVE     General: alert, cooperative, no distress   Mental  status: mental status: alert, oriented to person, place, and time, normal mood, behavior, speech, dress, motor activity, and thought processes   Resp: resp: no respiratory distress   Neuro: neuro: no gross deficits   Skin: skin: no discoloration or lesions of concern on visible areas   Live video viewing was used and showed:  HEALED INCISION IS PRESENT     Due to this being a TeleHealth evaluation, many elements of the physical examination are unable to be assessed. We discussed the expected course, resolution and complications of the diagnosis(es) in detail. Medication risks, benefits, costs, interactions, and alternatives were discussed as indicated. I advised her to contact the office if her condition worsens, changes or fails to improve as anticipated. She expressed understanding with the diagnosis(es) and plan. Pursuant to the emergency declaration under the [address] [place] [address], [facility] waiver authority and the [employer] and [employer] Act, this Virtual  Visit was conducted, with patient's consent, to reduce the patient's risk of exposure to COVID-19 and provide continuity of care for an established patient. Services were provided through a video synchronous discussion virtually to substitute for in-person clinic visit.                Jazmin Church MD  4/8/2020  9:20 AM

## 2020-04-08 NOTE — PROGRESS NOTES
Rod Malone is a 54 y.o. female evaluated via telephone on 4/8/2020. In office. Consent:  She and/or health care decision maker is aware that that she may receive a bill for this telephone service, depending on her insurance coverage, and has provided verbal consent to proceed: Yes      Documentation:  I communicated with the patient and/or health care decision maker about left foot pain. Details of this discussion including any medical advice provided:       I affirm this is a Patient Initiated Episode with an Established Patient who has not had a related appointment within my department in the past 7 days or scheduled within the next 24 hours. Note: not billable if this call serves to triage the patient into an appointment for the relevant concern    Since your last office visit have you had any    1. New medical diagnoses No  2. Changes in your medications  No  3. Surgical procedures No  4. Changes in your Allergies to medication No  5.  What is your pain level today 0/10     Celestino Samano LPN

## 2020-04-08 NOTE — PATIENT INSTRUCTIONS
You may start walking around your house without the CAM Boot. Continue to wear the CAM Boot while outside for the next 15 days and then you may discontinue the Boot. Try doing some runner stretches at home Will have her daughter bring her a Short CAM Boot since the NILES Goodman is becoming uncomfortable. You may return to work while wearing the Boot if needed Follow up in 4 weeks via virtual appointment Patient would like to return to work and have letter faxed to the number [phone]

## 2020-04-08 NOTE — LETTER
NOTIFICATION RETURN TO WORK / SCHOOL 
 
4/8/2020 9:27 AM 
 
Ms. Mary Marie [address] [facility] [address] To Whom It May Concern: 
 
Mary Marie is currently under the care of [address] [place] [address] [address] [place]. She may return to work and wear her CAM Boot as needed for pain. If there are questions or concerns please have the patient contact our office.  
 
 
 
Sincerely, 
 
 
Pedrito Hanna MD

## 2020-04-08 NOTE — LETTER
NOTIFICATION RETURN TO WORK / SCHOOL 
 
4/8/2020 9:32 AM 
 
Ms. Kenton Gottlieb [address] [facility] [address] To Whom It May Concern: 
 
Kenton Gottlieb is currently under the care of [address] [place] [address] [place]. She will return to work starting Monday, April 13 and may wear her CAM Boot as needed for pain. If there are questions or concerns please have the patient contact our office.  
 
 
 
Sincerely, 
 
 
Mohit Friedman MD

## 2020-05-06 ENCOUNTER — VIRTUAL VISIT (OUTPATIENT)
Dept: ORTHOPEDIC SURGERY | Age: 55
End: 2020-05-06

## 2020-05-06 DIAGNOSIS — M76.62 ACHILLES TENDINITIS OF LEFT LOWER EXTREMITY: Primary | ICD-10-CM

## 2020-05-06 DIAGNOSIS — Z98.890 POST-OPERATIVE STATE: ICD-10-CM

## 2020-05-06 NOTE — PROGRESS NOTES
Patient: Dallas Hernandez             MRN: [id number]     SSN: [id number] There is no height or weight on file to calculate BMI. YOB: 1965     AGE: 54 y.o. EX: female    PCP: Jerod Ozuna MD     VIRTUAL VISIT      Dallas Hernandez is a 54 y.o. female who was seen by virtual synchronous (real-time) audio-video technology on 5/6/2020. via  [employer]. This virtual Telehealth visit was performed while I was located at : [facility], and Dallas Hernandez was located at her home at 8:07 AM, 5/6/2020. Services were provided as a substitute for in-person clinic visit. Virtual time  OV/Chart Review was[de-identified]  8:07 AM  to 8:14 AM  of 5/6/2020     Consent:@ and/or the patient's healthcare decision maker is aware that this patient-initiated Telehealth encounter is a billable service, with coverage as determined by their insurance carrier. The patient is aware that they may receive a bill and has provided verbal consent to proceed. ASSESSMENT        ICD-10-CM ICD-9-CM    1. Achilles tendinitis of left lower extremity M76.62 726.71    2. Post-operative state Z98.890 V45.89          PLAN       1. Continue WBAT  2. 6 week RTO            CPT Codes 99883-24281 for Established Patients may apply to this Telehealth Visit  Time-based coding, delete if not needed: I spent at least 10 minutes with this established patient, and >50% of the time was spent counseling and/or coordinating care regarding      Due to this being a TeleHealth evaluation, many elements of the physical examination are unable to be assessed. We discussed the expected course, resolution and complications of the diagnosis(es) in detail. Medication risks, benefits, costs, interactions, and alternatives were discussed as indicated. I advised her to contact the office if her condition worsens, changes or fails to improve as anticipated. She expressed understanding with the diagnosis(es) and plan. Pursuant to the emergency declaration under the [facility] authority and the [employer] Act, this Virtual  Visit was conducted, with patient's consent, to reduce the patient's risk of exposure to COVID-19 and provide continuity of care for an established patient. LIDA Milligan was seen for No chief complaint on file. Since I saw her last she is doing very well. She reports no major pain or discomfort. On Monday Wednesdays and Fridays, she will intermittently use the cam walker boot. On Tuesday Thursday Saturday, she goes with her regular shoe. She describes having some mild swelling to the Achilles tendon region, but this is improved when she uses a compression stocking. The pain that she has at the worst, is more of a soreness, she rates a soreness 4 out of 10. She denies a limp. She states this is 1 of the best surgery she is ever had, she is quite pleased with how she is doing. She has returned to work on April 13, 2020. She is doing quite well. On examination on video surveillance her incisions well-healed she has excellent range of motion of her ankle, nice wrinkles along Achilles tendon, no swelling visualized at this current time this morning. And she is non tender when she presses along her Achilles tendon under live video assessment. She is doing well otherwise see her on video assessment in 6 weeks a very pleased with how she is doing. COLTEN TRIAGE QUESTIONNAIRE    Since your last office visit, have you had any:    1. New medical diagnoses No  2. Changes in your medications  No  3. Surgical procedures No  4. Changes in your Allergies to medication No  5. What is your pain level today 4/10  6.  Changes in: PMH, SH, ROS: No          CHART REVIEW      Date of Procedure: 1/24/2020   Preoperative Diagnosis: LEFT ACHILLES TENDON TEAR, INITIAL ENCOUNTER, INSERTIONAL CALCIFIC ACHILLES TENDINOPATHY  Postoperative Diagnosis: as above  Procedure(s):  1. SHARP EXCISIONAL DEBRIDEMENT AND REPAIR OF LEFT ACHILLES TENDON  2. RETROCALCANEAL BURSECTOMY, CALCANEAL OSTEOTOMY  3.  POSSIBLE GASTROC MYRON RELEASE/ NEVER BLOCK/ ARTHREX/ LARGE C-ARM  Surgeon(s)    Current Outpatient Medications   Medication Sig    zolpidem (Ambien) 10 mg tablet Take 1 Tab by mouth nightly as needed for Sleep. Max Daily Amount: 10 mg.    venlafaxine (EFFEXOR) 37.5 mg tablet Take 1 Tab by mouth daily.  simvastatin (ZOCOR) 40 mg tablet Take 1 Tab by mouth nightly.  polyethylene glycol (Miralax) 17 gram packet Take 1 Packet by mouth daily.  metFORMIN (GLUCOPHAGE) 500 mg tablet Take 1 Tab by mouth two (2) times a day.  gabapentin (NEURONTIN) 100 mg capsule Take 1 Cap by mouth three (3) times daily. Max Daily Amount: 300 mg.    losartan (COZAAR) 25 mg tablet Take 1 Tab by mouth daily.  rivaroxaban (XARELTO) 10 mg tablet Take 1 Tab by mouth daily.  furosemide (LASIX) 20 mg tablet Take 1 Tab by mouth daily as needed for Other (severe swelling). No current facility-administered medications for this visit. THE  FOR Pierce Becker MD 5/6/2020 . Social History     Occupational History    Not on file   Tobacco Use    Smoking status: Current Every Day Smoker     Packs/day: 1.00    Smokeless tobacco: Never Used   Substance and Sexual Activity    Alcohol use: Yes     Frequency: Never     Comment: occassionl    Drug use: No    Sexual activity: Not Currently     Partners: Male        Prior to Admission medications    Medication Sig Start Date End Date Taking? Authorizing Provider   zolpidem (Ambien) 10 mg tablet Take 1 Tab by mouth nightly as needed for Sleep. Max Daily Amount: 10 mg. 4/7/20   Julian Vivas MD   venlafaxine (EFFEXOR) 37.5 mg tablet Take 1 Tab by mouth daily.  4/7/20   Julian Vivas MD   simvastatin (ZOCOR) 40 mg tablet Take 1 Tab by mouth nightly. 20   Julian Vivas MD   polyethylene glycol (Miralax) 17 gram packet Take 1 Packet by mouth daily. 20   Julain Vivas MD   metFORMIN (GLUCOPHAGE) 500 mg tablet Take 1 Tab by mouth two (2) times a day. 20   Julian Vivas MD   gabapentin (NEURONTIN) 100 mg capsule Take 1 Cap by mouth three (3) times daily. Max Daily Amount: 300 mg. 20   Julian Vivas MD   losartan (COZAAR) 25 mg tablet Take 1 Tab by mouth daily. 20   Julian Vivas MD   rivaroxaban (XARELTO) 10 mg tablet Take 1 Tab by mouth daily. 20   Em Fischer PA   furosemide (LASIX) 20 mg tablet Take 1 Tab by mouth daily as needed for Other (severe swelling). 20   Julian Vivas MD     Allergies   Allergen Reactions    Aspirin Swelling    Codeine Swelling    Penicillins Swelling        ROS    Past Medical History:   Diagnosis Date    Breast cancer ([facility] 2013    right breast    Breast cancer ([facility]     Diabetes ([facility]     Hypercholesterolemia     Hypertension     Insomnia     Obesity (BMI 30.0-34. 9)     UTI (urinary tract infection)      Past Surgical History:   Procedure Laterality Date    HX CARPAL TUNNEL RELEASE Bilateral     HX  SECTION      HX CHOLECYSTECTOMY      HX HYSTERECTOMY      HX KNEE ARTHROSCOPY Bilateral     HX MASTECTOMY Right     HX TUBAL LIGATION       Social History     Socioeconomic History    Marital status:      Spouse name: Not on file    Number of children: Not on file    Years of education: Not on file    Highest education level: Not on file   Occupational History    Not on file   Social Needs    Financial resource strain: Not on file    Food insecurity     Worry: Not on file     Inability: Not on file    Transportation needs     Medical: Not on file     Non-medical: Not on file   Tobacco Use    Smoking status: Current Every Day Smoker     Packs/day: 1.00    Smokeless tobacco: Never Used   Substance and Sexual Activity    Alcohol use: Yes     Frequency: Never     Comment: occassionl    Drug use: No    Sexual activity: Not Currently     Partners: Male   Lifestyle    Physical activity     Days per week: Not on file     Minutes per session: Not on file    Stress: Not on file   Relationships    Social connections     Talks on phone: Not on file     Gets together: Not on file     Attends Orthodoxy service: Not on file     Active member of club or organization: Not on file     Attends meetings of clubs or organizations: Not on file     Relationship status: Not on file    Intimate partner violence     Fear of current or ex partner: Not on file     Emotionally abused: Not on file     Physically abused: Not on file     Forced sexual activity: Not on file   Other Topics Concern    Not on file   Social History Narrative    Not on file     Family History   Problem Relation Age of Onset    Cancer Mother     COPD Mother     Alcohol abuse Father     Diabetes Father     Hypertension Father     Heart Disease Father     Elevated Lipids Father     Hypertension Brother     Hypertension Maternal Grandmother     Hypertension Maternal Grandfather           OBJECTIVE     General: alert, cooperative, no distress   Mental  status: mental status: alert, oriented to person, place, and time, normal mood, behavior, speech, dress, motor activity, and thought processes   Resp: resp: no respiratory distress   Neuro: neuro: no gross deficits   Skin: skin: no discoloration or lesions of concern on visible areas   Live video viewing was used and showed:                  Nnamdi Rodríguez MD  5/6/2020  8:07 AM

## 2020-06-01 ENCOUNTER — TELEPHONE (OUTPATIENT)
Dept: FAMILY MEDICINE CLINIC | Age: 55
End: 2020-06-01

## 2020-06-01 DIAGNOSIS — R23.2 HOT FLASHES: ICD-10-CM

## 2020-06-01 RX ORDER — VENLAFAXINE 37.5 MG/1
75 TABLET ORAL DAILY
Qty: 180 TAB | Refills: 1 | Status: SHIPPED | OUTPATIENT
Start: 2020-06-01 | End: 2020-08-03 | Stop reason: SDUPTHER

## 2020-06-01 NOTE — TELEPHONE ENCOUNTER
Pt is requesting Dr Wilton Lea to send in a prescription for Venlafazine [facility]) 37.5mg. stated the last prescription he sent in instructed her to take 1 table daily when she is suppose to be taking 2 pills daily. Not sure if it was a misprint on the prescription or not but she is completely out of this medication and needs it.  Please contact pt for any questions

## 2020-08-02 DIAGNOSIS — G47.00 INSOMNIA, UNSPECIFIED TYPE: ICD-10-CM

## 2020-08-03 ENCOUNTER — VIRTUAL VISIT (OUTPATIENT)
Dept: FAMILY MEDICINE CLINIC | Age: 55
End: 2020-08-03

## 2020-08-03 DIAGNOSIS — M17.0 PRIMARY OSTEOARTHRITIS OF BOTH KNEES: ICD-10-CM

## 2020-08-03 DIAGNOSIS — R60.0 PEDAL EDEMA: ICD-10-CM

## 2020-08-03 DIAGNOSIS — I10 ESSENTIAL HYPERTENSION: ICD-10-CM

## 2020-08-03 DIAGNOSIS — E78.00 HYPERCHOLESTEROLEMIA: ICD-10-CM

## 2020-08-03 DIAGNOSIS — Z85.3 HISTORY OF RIGHT BREAST CANCER: ICD-10-CM

## 2020-08-03 DIAGNOSIS — E66.01 SEVERE OBESITY (HCC): ICD-10-CM

## 2020-08-03 DIAGNOSIS — R23.2 HOT FLASHES: ICD-10-CM

## 2020-08-03 DIAGNOSIS — E11.9 TYPE 2 DIABETES MELLITUS WITHOUT COMPLICATION, WITHOUT LONG-TERM CURRENT USE OF INSULIN (HCC): Primary | ICD-10-CM

## 2020-08-03 DIAGNOSIS — G47.00 INSOMNIA, UNSPECIFIED TYPE: ICD-10-CM

## 2020-08-03 RX ORDER — GABAPENTIN 100 MG/1
100 CAPSULE ORAL 3 TIMES DAILY
Qty: 90 CAP | Refills: 2 | Status: SHIPPED | OUTPATIENT
Start: 2020-08-03 | End: 2021-02-03

## 2020-08-03 RX ORDER — LOSARTAN POTASSIUM 25 MG/1
25 TABLET ORAL DAILY
Qty: 90 TAB | Refills: 1 | Status: SHIPPED | OUTPATIENT
Start: 2020-08-03 | End: 2021-03-22 | Stop reason: SDUPTHER

## 2020-08-03 RX ORDER — METFORMIN HYDROCHLORIDE 500 MG/1
500 TABLET ORAL 2 TIMES DAILY
Qty: 180 TAB | Refills: 1 | Status: SHIPPED | OUTPATIENT
Start: 2020-08-03 | End: 2020-09-03 | Stop reason: SDUPTHER

## 2020-08-03 RX ORDER — MELOXICAM 15 MG/1
15 TABLET ORAL DAILY
Qty: 90 TAB | Refills: 1 | Status: SHIPPED | OUTPATIENT
Start: 2020-08-03 | End: 2021-02-03

## 2020-08-03 RX ORDER — ZOLPIDEM TARTRATE 10 MG/1
TABLET ORAL
Qty: 30 TAB | Refills: 0 | Status: SHIPPED | OUTPATIENT
Start: 2020-08-03 | End: 2020-09-04 | Stop reason: SDUPTHER

## 2020-08-03 RX ORDER — SIMVASTATIN 40 MG/1
40 TABLET, FILM COATED ORAL
Qty: 90 TAB | Refills: 1 | Status: SHIPPED | OUTPATIENT
Start: 2020-08-03 | End: 2021-03-22 | Stop reason: SDUPTHER

## 2020-08-03 RX ORDER — VENLAFAXINE 37.5 MG/1
75 TABLET ORAL DAILY
Qty: 180 TAB | Refills: 1 | Status: SHIPPED | OUTPATIENT
Start: 2020-08-03 | End: 2021-03-22 | Stop reason: SDUPTHER

## 2020-08-03 RX ORDER — ZOLPIDEM TARTRATE 10 MG/1
TABLET ORAL
Qty: 30 TAB | Refills: 0 | Status: SHIPPED | OUTPATIENT
Start: 2020-08-03 | End: 2020-08-03 | Stop reason: SDUPTHER

## 2020-08-03 NOTE — PROGRESS NOTES
Alcides Lopez is a 54 y.o. female who was seen by synchronous (real-time) audio-video technology on 8/3/2020 for Diabetes and Hypertension        Assessment & Plan:       ICD-10-CM ICD-9-CM    1. Type 2 diabetes mellitus without complication, without long-term current use of insulin (HCC)  E11.9 250.00 metFORMIN (GLUCOPHAGE) 500 mg tablet      CBC WITH AUTOMATED DIFF      METABOLIC PANEL, COMPREHENSIVE      LIPID PANEL      HEMOGLOBIN A1C WITH EAG      MICROALBUMIN, UR, RAND W/ MICROALB/CREAT RATIO   2. Essential hypertension  I10 401.9 losartan (COZAAR) 25 mg tablet   3. Hypercholesterolemia  E78.00 272.0 simvastatin (ZOCOR) 40 mg tablet   4. Insomnia, unspecified type  G47.00 780.52 zolpidem (AMBIEN) 10 mg tablet   5. Primary osteoarthritis of both knees  M17.0 715.16 meloxicam (MOBIC) 15 mg tablet   6. Hot flashes  R23.2 782.62 venlafaxine (EFFEXOR) 37.5 mg tablet   7. History of right breast cancer  Z85.3 V10.3 gabapentin (NEURONTIN) 100 mg capsule   8. Severe obesity ([facility] [address]  E66.01 278.01    9. Pedal edema  R60.0 782.3      Subjective:   Alcides Lopez is seen for follow-up care. Knee pain: Patient has bilateral knee osteoarthritis. She denies knee swelling or redness but has pain that flares up on and off. Requests an anti-inflammatory. She is on meloxicam 7.5 mg daily and has run out of the medication. We will increase this to 15 mg daily. I will send this into the pharmacy. I will follow-up at next visit. If pain persists we will do knee x-rays and she might need to see the orthopedist.  Hypertension: Patient has hypertension. Blood pressure has been stable. She denies headache, changes in vision or focal weakness. She is on losartan 25 mg daily. We will send in a refill of medication. Diabetes mellitus type 2: Patient is on metformin 500 mg 3 times a day. She needs to have a foot exam and eye exam done. We do need to do labs. She will schedule an appointment to come in for these.   I will follow-up at next visit. Her last HbA1c was 7.3. She is doing lifestyle and dietary modification. Neuropathy: Patient has neuropathy that affects both lower extremities. She gets numbness and tingling of the feet. She also has neuropathy in the hands. She is on gabapentin. She would like a refill of medication. Prescription will be sent in. Lipidemia: Patient has dyslipidemia. She is on Zocor. Would like a refill of medication. Plan to recheck lipid panel at next visit. Menopausal symptoms: Patient has menopausal symptoms with changes in mood and hot flashes. She takes Effexor. Would like a refill of medication. She has been stable on the medication. Insomnia: Patient has insomnia and takes Ambien. She has run out of her medication and would like a refill of the same. I will send in a refill of medication. I will follow-up at next visit. Obesity: Patient has a BMI of 34.38. She will intensify lifestyle and dietary modification. Pedal edema: Patient has pedal edema that comes on and off. She has used furosemide for this. At the moment this is stable. We will check labs. Prior to Admission medications    Medication Sig Start Date End Date Taking? Authorizing Provider   losartan (COZAAR) 25 mg tablet Take 1 Tab by mouth daily. 8/3/20  Yes Tess Oliveros MD   metFORMIN (GLUCOPHAGE) 500 mg tablet Take 1 Tab by mouth two (2) times a day. 8/3/20  Yes Julian Vivas MD   simvastatin (ZOCOR) 40 mg tablet Take 1 Tab by mouth nightly. 8/3/20  Yes Tess Oliveros MD   venlafaxine (EFFEXOR) 37.5 mg tablet Take 2 Tabs by mouth daily. 8/3/20  Yes Tess Oliveros MD   zolpidem (AMBIEN) 10 mg tablet TAKE ONE TABLET BY MOUTH NIGHTLY AS NEEDED FOR SLEEP 8/3/20  Yes Julian Vivas MD   gabapentin (NEURONTIN) 100 mg capsule Take 1 Cap by mouth three (3) times daily. Max Daily Amount: 300 mg. 8/3/20  Yes Julian Vivas MD   meloxicam (MOBIC) 15 mg tablet Take 1 Tab by mouth daily.  8/3/20 Yes Maxine Jama MD   zolpidem (AMBIEN) 10 mg tablet TAKE ONE TABLET BY MOUTH NIGHTLY AS NEEDED FOR SLEEP 8/3/20 8/3/20  Julian Vivas MD   venlafaxine (EFFEXOR) 37.5 mg tablet Take 2 Tabs by mouth daily. 6/1/20 8/3/20  Julian Vivas MD   zolpidem (Ambien) 10 mg tablet Take 1 Tab by mouth nightly as needed for Sleep. Max Daily Amount: 10 mg. 4/7/20 8/3/20  Julian Vivas MD   simvastatin (ZOCOR) 40 mg tablet Take 1 Tab by mouth nightly. 4/7/20 8/3/20  Julian Vivas MD   polyethylene glycol (Miralax) 17 gram packet Take 1 Packet by mouth daily. 4/7/20 8/3/20  Julian Vivas MD   metFORMIN (GLUCOPHAGE) 500 mg tablet Take 1 Tab by mouth two (2) times a day. 4/7/20 8/3/20  Julian Vivas MD   gabapentin (NEURONTIN) 100 mg capsule Take 1 Cap by mouth three (3) times daily. Max Daily Amount: 300 mg. 4/7/20 8/3/20  Julian Vivas MD   losartan (COZAAR) 25 mg tablet Take 1 Tab by mouth daily. 4/7/20 8/3/20  Julian Vivas MD   rivaroxaban (XARELTO) 10 mg tablet Take 1 Tab by mouth daily. 1/24/20 8/3/20  RANDI Doshi   furosemide (LASIX) 20 mg tablet Take 1 Tab by mouth daily as needed for Other (severe swelling). 1/21/20   Julian Vivas MD     ROS Review of all systems is negative except as noted above in the HPI. Objective:   No flowsheet data found.      Constitutional: [x] Appears well-developed and well-nourished [x] No apparent distress      Mental status: [x] Alert and awake  [x] Oriented to person/place/time [x] Able to follow commands      HENT: [x] Normocephalic, atraumatic    [x] Mouth/Throat: Mucous membranes are moist    Pulmonary/Chest: [x] Respiratory effort normal   [x] No visualized signs of difficulty breathing or respiratory distress            Neurological:        [x] No Facial Asymmetry (Cranial nerve 7 motor function) (limited exam due to video visit)                     Psychiatric:       [x] Normal Affect    We discussed the expected course, resolution and complications of the diagnosis(es) in detail. Medication risks, benefits, costs, interactions, and alternatives were discussed as indicated. I advised her to contact the office if her condition worsens, changes or fails to improve as anticipated. She expressed understanding with the diagnosis(es) and plan. Anthony Velasquez, who was evaluated through a patient-initiated, synchronous (real-time) audio-video encounter, and/or her healthcare decision maker, is aware that it is a billable service, with coverage as determined by her insurance carrier. She provided verbal consent to proceed: Yes, and patient identification was verified. It was conducted pursuant to the emergency declaration under the [facility], [address] [place] [address] authority and the [employer] Act. A caregiver was present when appropriate. Ability to conduct physical exam was limited. I was in the office. The patient was at home.       Dianelys Gates MD

## 2020-08-25 ENCOUNTER — HOSPITAL ENCOUNTER (OUTPATIENT)
Dept: LAB | Age: 55
Discharge: HOME | End: 2020-08-25
Payer: COMMERCIAL

## 2020-08-25 ENCOUNTER — LAB ONLY (OUTPATIENT)
Dept: FAMILY MEDICINE CLINIC | Age: 55
End: 2020-08-25

## 2020-08-25 DIAGNOSIS — E78.00 HYPERCHOLESTEROLEMIA: ICD-10-CM

## 2020-08-25 DIAGNOSIS — Z13.29 SCREENING FOR ENDOCRINE, METABOLIC AND IMMUNITY DISORDER: ICD-10-CM

## 2020-08-25 DIAGNOSIS — Z13.0 SCREENING FOR ENDOCRINE, METABOLIC AND IMMUNITY DISORDER: ICD-10-CM

## 2020-08-25 DIAGNOSIS — E11.9 TYPE 2 DIABETES MELLITUS WITHOUT COMPLICATION, WITHOUT LONG-TERM CURRENT USE OF INSULIN (HCC): ICD-10-CM

## 2020-08-25 DIAGNOSIS — Z01.89 ENCOUNTER FOR LABORATORY EXAMINATION: Primary | ICD-10-CM

## 2020-08-25 DIAGNOSIS — Z13.228 SCREENING FOR ENDOCRINE, METABOLIC AND IMMUNITY DISORDER: ICD-10-CM

## 2020-08-25 LAB
ALBUMIN SERPL-MCNC: 3.8 G/DL (ref 3.4–5)
ALBUMIN/GLOB SERPL: 1.4 {RATIO} (ref 0.8–1.7)
ALP SERPL-CCNC: 84 U/L (ref 45–117)
ALT SERPL-CCNC: 52 U/L (ref 13–56)
ANION GAP SERPL CALC-SCNC: 3 MMOL/L (ref 3–18)
AST SERPL-CCNC: 31 U/L (ref 10–38)
BASOPHILS # BLD: 0 K/UL (ref 0–0.1)
BASOPHILS NFR BLD: 0 % (ref 0–2)
BILIRUB SERPL-MCNC: 0.5 MG/DL (ref 0.2–1)
BUN SERPL-MCNC: 13 MG/DL (ref 7–18)
BUN/CREAT SERPL: 18 (ref 12–20)
CALCIUM SERPL-MCNC: 9.3 MG/DL (ref 8.5–10.1)
CHLORIDE SERPL-SCNC: 107 MMOL/L (ref 100–111)
CHOLEST SERPL-MCNC: 191 MG/DL
CO2 SERPL-SCNC: 31 MMOL/L (ref 21–32)
CREAT SERPL-MCNC: 0.74 MG/DL (ref 0.6–1.3)
CREAT UR-MCNC: 119 MG/DL (ref 30–125)
DIFFERENTIAL METHOD BLD: ABNORMAL
EOSINOPHIL # BLD: 0.1 K/UL (ref 0–0.4)
EOSINOPHIL NFR BLD: 2 % (ref 0–5)
ERYTHROCYTE [DISTWIDTH] IN BLOOD BY AUTOMATED COUNT: 13.8 % (ref 11.6–14.5)
EST. AVERAGE GLUCOSE BLD GHB EST-MCNC: 232 MG/DL
GLOBULIN SER CALC-MCNC: 2.7 G/DL (ref 2–4)
GLUCOSE SERPL-MCNC: 237 MG/DL (ref 74–99)
HBA1C MFR BLD: 9.7 % (ref 4.2–5.6)
HCT VFR BLD AUTO: 48.1 % (ref 35–45)
HDLC SERPL-MCNC: 47 MG/DL (ref 40–60)
HDLC SERPL: 4.1 {RATIO} (ref 0–5)
HGB BLD-MCNC: 16.2 G/DL (ref 12–16)
LDLC SERPL CALC-MCNC: 92.6 MG/DL (ref 0–100)
LIPID PROFILE,FLP: ABNORMAL
LYMPHOCYTES # BLD: 2.2 K/UL (ref 0.9–3.6)
LYMPHOCYTES NFR BLD: 29 % (ref 21–52)
MCH RBC QN AUTO: 31.3 PG (ref 24–34)
MCHC RBC AUTO-ENTMCNC: 33.7 G/DL (ref 31–37)
MCV RBC AUTO: 93 FL (ref 74–97)
MICROALBUMIN UR-MCNC: 62.1 MG/DL (ref 0–3)
MICROALBUMIN/CREAT UR-RTO: 522 MG/G (ref 0–30)
MONOCYTES # BLD: 0.5 K/UL (ref 0.05–1.2)
MONOCYTES NFR BLD: 6 % (ref 3–10)
NEUTS SEG # BLD: 4.8 K/UL (ref 1.8–8)
NEUTS SEG NFR BLD: 63 % (ref 40–73)
PLATELET # BLD AUTO: 183 K/UL (ref 135–420)
PMV BLD AUTO: 11.3 FL (ref 9.2–11.8)
POTASSIUM SERPL-SCNC: 4.5 MMOL/L (ref 3.5–5.5)
PROT SERPL-MCNC: 6.5 G/DL (ref 6.4–8.2)
RBC # BLD AUTO: 5.17 M/UL (ref 4.2–5.3)
SODIUM SERPL-SCNC: 141 MMOL/L (ref 136–145)
TRIGL SERPL-MCNC: 257 MG/DL (ref ?–150)
VLDLC SERPL CALC-MCNC: 51.4 MG/DL
WBC # BLD AUTO: 7.6 K/UL (ref 4.6–13.2)

## 2020-08-25 PROCEDURE — 36415 COLL VENOUS BLD VENIPUNCTURE: CPT

## 2020-08-25 PROCEDURE — 83036 HEMOGLOBIN GLYCOSYLATED A1C: CPT

## 2020-08-25 PROCEDURE — 80053 COMPREHEN METABOLIC PANEL: CPT

## 2020-08-25 PROCEDURE — 80061 LIPID PANEL: CPT

## 2020-08-25 PROCEDURE — 85025 COMPLETE CBC W/AUTO DIFF WBC: CPT

## 2020-08-25 PROCEDURE — 82043 UR ALBUMIN QUANTITATIVE: CPT

## 2020-08-25 NOTE — PROGRESS NOTES
Patient presents for lab draw ordered by Dr. Jordana Jones  Ordering Department/Practice:  [facility]  Date Ordered:  8-3-2020     The following labs were drawn and sent to [facility]     CBC, Lipid Profile, CMP and HgA1C    The following tubes were sent:    Gold  ( 1) and Lavender  ( 1)    Draw site:  LAC  Pain Level:0  Needle Gauge23  Aseptic technique used  Blood thinners:n  Band-Aid applied  Draw fee added   Procedure tolerated well, patient voiced no complaints.

## 2020-09-03 DIAGNOSIS — D75.1 POLYCYTHEMIA: Primary | ICD-10-CM

## 2020-09-03 DIAGNOSIS — E11.9 TYPE 2 DIABETES MELLITUS WITHOUT COMPLICATION, WITHOUT LONG-TERM CURRENT USE OF INSULIN (HCC): ICD-10-CM

## 2020-09-03 RX ORDER — METFORMIN HYDROCHLORIDE 500 MG/1
1000 TABLET ORAL 2 TIMES DAILY
Qty: 180 TAB | Refills: 1 | Status: SHIPPED | OUTPATIENT
Start: 2020-09-03 | End: 2021-03-22 | Stop reason: SDUPTHER

## 2020-09-03 NOTE — PROGRESS NOTES
Please let patient know her HbA1c is 9.7. This indicates uncontrolled diabetes mellitus. She has been on metformin 500 mg 3 x day. I would recommend she go up on the metformin. I will have her take at thousand milligrams twice a day. She should keep a blood glucose log and follow-up with me in the clinic within the next 4 weeks to discuss further medication adjustments. She may need to be seen by the endocrinologist.  She should intensify lifestyle and dietary modification. Her hemoglobin is elevated at 16.2. I will place a referral to the hematologist for evaluation of this.     Sherron Byrne MD

## 2020-09-04 DIAGNOSIS — G47.00 INSOMNIA, UNSPECIFIED TYPE: ICD-10-CM

## 2020-09-04 NOTE — TELEPHONE ENCOUNTER
Requested Prescriptions     Pending Prescriptions Disp Refills    zolpidem (AMBIEN) 10 mg tablet 30 Tab 0     Sig: TAKE ONE TABLET BY MOUTH NIGHTLY AS NEEDED FOR SLEEP

## 2020-09-09 RX ORDER — ZOLPIDEM TARTRATE 10 MG/1
TABLET ORAL
Qty: 30 TAB | Refills: 0 | Status: SHIPPED | OUTPATIENT
Start: 2020-09-09 | End: 2020-11-10 | Stop reason: SDUPTHER

## 2020-09-15 NOTE — PROGRESS NOTES
Spoke with patient at this time. Patient agree to go up on the metformin at this time but patient need a need rx because she will run out of the 500 mg. Please send in metformin 1000 mg BID. Patient also request an order for her annual mammogram patient states she normal go in October.

## 2020-09-16 DIAGNOSIS — Z12.31 BREAST CANCER SCREENING BY MAMMOGRAM: Primary | ICD-10-CM

## 2020-09-16 NOTE — PROGRESS NOTES
Patient's PCP out of office and received message from LPN with requests. Patient had been called by LPN advising of medication dose changes of metformin increased to 1000 mg BID. It appears metformin was sent to pharmacy on 9/3/2020 for 1000 mg BID by PCP- check with pharmacy for increased dose. Per patient request, referral for mammogram placed.

## 2020-10-19 ENCOUNTER — HOSPITAL ENCOUNTER (OUTPATIENT)
Dept: MAMMOGRAPHY | Age: 55
Discharge: HOME | End: 2020-10-19
Payer: COMMERCIAL

## 2020-10-19 DIAGNOSIS — Z12.31 BREAST CANCER SCREENING BY MAMMOGRAM: ICD-10-CM

## 2020-10-19 PROCEDURE — 77067 SCR MAMMO BI INCL CAD: CPT

## 2020-11-10 DIAGNOSIS — G47.00 INSOMNIA, UNSPECIFIED TYPE: ICD-10-CM

## 2020-11-11 RX ORDER — ZOLPIDEM TARTRATE 10 MG/1
TABLET ORAL
Qty: 30 TAB | Refills: 0 | Status: SHIPPED | OUTPATIENT
Start: 2020-11-11 | End: 2020-12-07

## 2020-12-04 DIAGNOSIS — G47.00 INSOMNIA, UNSPECIFIED TYPE: ICD-10-CM

## 2020-12-04 NOTE — TELEPHONE ENCOUNTER
Requested Prescriptions     Pending Prescriptions Disp Refills    zolpidem (AMBIEN) 10 mg tablet [Pharmacy Med Name: Zolpidem Tartrate 10 MG Oral Tablet] 30 Tab 0     Sig: TAKE 1 TABLET BY MOUTH NIGHTLY AS NEEDED FOR SLEEP

## 2020-12-07 RX ORDER — ZOLPIDEM TARTRATE 10 MG/1
TABLET ORAL
Qty: 30 TAB | Refills: 0 | Status: SHIPPED | OUTPATIENT
Start: 2020-12-07 | End: 2021-01-04 | Stop reason: SDUPTHER

## 2021-01-04 DIAGNOSIS — G47.00 INSOMNIA, UNSPECIFIED TYPE: ICD-10-CM

## 2021-01-04 RX ORDER — ZOLPIDEM TARTRATE 10 MG/1
TABLET ORAL
Qty: 30 TAB | Refills: 2 | Status: SHIPPED | OUTPATIENT
Start: 2021-01-04 | End: 2021-03-22 | Stop reason: SDUPTHER

## 2021-01-04 NOTE — TELEPHONE ENCOUNTER
Requested Prescriptions     Pending Prescriptions Disp Refills    zolpidem (AMBIEN) 10 mg tablet 30 Tab 0     Sig: TAKE 1 TABLET BY MOUTH NIGHTLY AS NEEDED FOR SLEEP

## 2021-01-04 NOTE — TELEPHONE ENCOUNTER
Please see refill request    Patient was last seen 8-3-2020    Last fill 12-7-2020  #30 X 0    Thank you

## 2021-01-07 ENCOUNTER — OFFICE VISIT (OUTPATIENT)
Dept: ONCOLOGY | Age: 56
End: 2021-01-07
Payer: COMMERCIAL

## 2021-01-07 ENCOUNTER — HOSPITAL ENCOUNTER (OUTPATIENT)
Dept: LAB | Age: 56
Discharge: HOME | End: 2021-01-07
Payer: COMMERCIAL

## 2021-01-07 VITALS
DIASTOLIC BLOOD PRESSURE: 91 MMHG | OXYGEN SATURATION: 98 % | BODY MASS INDEX: 34.17 KG/M2 | HEART RATE: 74 BPM | HEIGHT: 66 IN | WEIGHT: 212.6 LBS | RESPIRATION RATE: 16 BRPM | SYSTOLIC BLOOD PRESSURE: 167 MMHG

## 2021-01-07 DIAGNOSIS — D75.1 POLYCYTHEMIA: Primary | ICD-10-CM

## 2021-01-07 DIAGNOSIS — D75.1 POLYCYTHEMIA: ICD-10-CM

## 2021-01-07 DIAGNOSIS — Z85.3 HISTORY OF BREAST CANCER: ICD-10-CM

## 2021-01-07 LAB
ALBUMIN SERPL-MCNC: 3.7 G/DL (ref 3.4–5)
ALBUMIN/GLOB SERPL: 1.2 {RATIO} (ref 0.8–1.7)
ALP SERPL-CCNC: 84 U/L (ref 45–117)
ALT SERPL-CCNC: 48 U/L (ref 13–56)
ANION GAP SERPL CALC-SCNC: 4 MMOL/L (ref 3–18)
AST SERPL-CCNC: 22 U/L (ref 10–38)
BASOPHILS # BLD: 0 K/UL (ref 0–0.1)
BASOPHILS NFR BLD: 0 % (ref 0–2)
BILIRUB SERPL-MCNC: 1.1 MG/DL (ref 0.2–1)
BUN SERPL-MCNC: 10 MG/DL (ref 7–18)
BUN/CREAT SERPL: 14 (ref 12–20)
CALCIUM SERPL-MCNC: 9.3 MG/DL (ref 8.5–10.1)
CHLORIDE SERPL-SCNC: 105 MMOL/L (ref 100–111)
CO2 SERPL-SCNC: 31 MMOL/L (ref 21–32)
CREAT SERPL-MCNC: 0.72 MG/DL (ref 0.6–1.3)
DIFFERENTIAL METHOD BLD: ABNORMAL
EOSINOPHIL # BLD: 0.2 K/UL (ref 0–0.4)
EOSINOPHIL NFR BLD: 2 % (ref 0–5)
ERYTHROCYTE [DISTWIDTH] IN BLOOD BY AUTOMATED COUNT: 13.8 % (ref 11.6–14.5)
GLOBULIN SER CALC-MCNC: 3.1 G/DL (ref 2–4)
GLUCOSE SERPL-MCNC: 205 MG/DL (ref 74–99)
HCT VFR BLD AUTO: 48.7 % (ref 35–45)
HGB BLD-MCNC: 16.6 G/DL (ref 12–16)
LYMPHOCYTES # BLD: 2.9 K/UL (ref 0.9–3.6)
LYMPHOCYTES NFR BLD: 31 % (ref 21–52)
MCH RBC QN AUTO: 31.7 PG (ref 24–34)
MCHC RBC AUTO-ENTMCNC: 34.1 G/DL (ref 31–37)
MCV RBC AUTO: 93.1 FL (ref 74–97)
MONOCYTES # BLD: 0.7 K/UL (ref 0.05–1.2)
MONOCYTES NFR BLD: 8 % (ref 3–10)
NEUTS SEG # BLD: 5.3 K/UL (ref 1.8–8)
NEUTS SEG NFR BLD: 59 % (ref 40–73)
PLATELET # BLD AUTO: 217 K/UL (ref 135–420)
PMV BLD AUTO: 11.2 FL (ref 9.2–11.8)
POTASSIUM SERPL-SCNC: 4 MMOL/L (ref 3.5–5.5)
PROT SERPL-MCNC: 6.8 G/DL (ref 6.4–8.2)
RBC # BLD AUTO: 5.23 M/UL (ref 4.2–5.3)
SODIUM SERPL-SCNC: 140 MMOL/L (ref 136–145)
WBC # BLD AUTO: 9.1 K/UL (ref 4.6–13.2)

## 2021-01-07 PROCEDURE — 82668 ASSAY OF ERYTHROPOIETIN: CPT

## 2021-01-07 PROCEDURE — 99204 OFFICE O/P NEW MOD 45 MIN: CPT | Performed by: INTERNAL MEDICINE

## 2021-01-07 PROCEDURE — 82375 ASSAY CARBOXYHB QUANT: CPT

## 2021-01-07 PROCEDURE — 81206 BCR/ABL1 GENE MAJOR BP: CPT

## 2021-01-07 PROCEDURE — 81270 JAK2 GENE: CPT

## 2021-01-07 PROCEDURE — 80053 COMPREHEN METABOLIC PANEL: CPT

## 2021-01-07 PROCEDURE — 81219 CALR GENE COM VARIANTS: CPT

## 2021-01-07 PROCEDURE — 81402 MOPATH PROCEDURE LEVEL 3: CPT

## 2021-01-07 PROCEDURE — 85025 COMPLETE CBC W/AUTO DIFF WBC: CPT

## 2021-01-07 NOTE — PROGRESS NOTES
Hematology/Oncology Consultation Note      Date: 2021    Name: Ondina Buckley  : 1965        Parth Hernandez MD         Subjective:     Chief complaint: Polycythemia    History of Present Illness:   Ms. Berna Domingo is a most pleasant 54y.o. year old female who was seen for consultation of polycythemia. The patient has a past medical history significant of uncontrolled diabetes mellitus, right breast cancer (Dx 2013, S.p chemotherapy at [facility]) BRCA1 positive, obesity. 2020 CBC reported hemoglobin was of 16.2%, hematocrit was 48.1%. Total WBC was 7.6 and platelet was 699,385. Lab reviews indicated chronic polycythemia since at least 9/3/2019, hematocrit was 47.6%. 2020 chest x-ray showed no acute findings. She is an active smoker, 1/2 PPD for years. The patient otherwise has no other complaints. Denied fever, chills, night sweat, unintentional weight loss, skin lumps or bumps, acute bleeding or bruising issues. No acute bleeding, blood in stool, dark stool, melena, hematochezia, hemoptysis, dark urine, or easily bruising. Denied headache, acute vision change, dizziness, chest pain, worsen shortness of breath, palpitation, productive cough, nausea, vomiting, abdominal pain, altered bowel habits, dysuria, worsen bone pain or back pain, new focal numbness or weakness. Oncologic History:      Past Medical History, Family History, and Social History:    Past Medical History:   Diagnosis Date    BRCA1 positive 2012    Breast [facility] ([facility] [address] 2013    right breast    Breast cancer ([facility] [address]     Diabetes ([facility] [address]     Hypercholesterolemia     Hypertension     Insomnia     Menopause 2012    chemo put her in menopause    Obesity (BMI 30.0-34. 9)     UTI (urinary tract infection)      Past Surgical History:   Procedure Laterality Date    HX CARPAL TUNNEL RELEASE Bilateral     HX  SECTION      HX CHOLECYSTECTOMY      HX HYSTERECTOMY  2009    partial    HX KNEE ARTHROSCOPY Bilateral     HX MASTECTOMY Right 2013    HX TUBAL LIGATION       Social History     Socioeconomic History    Marital status:      Spouse name: Not on file    Number of children: Not on file    Years of education: Not on file    Highest education level: Not on file   Occupational History    Not on file   Social Needs    Financial resource strain: Not on file    Food insecurity     Worry: Not on file     Inability: Not on file    Transportation needs     Medical: Not on file     Non-medical: Not on file   Tobacco Use    Smoking status: Current Every Day Smoker     Packs/day: 1.00    Smokeless tobacco: Never Used   Substance and Sexual Activity    Alcohol use: Not Currently     Frequency: Never     Comment: occassionl    Drug use: No    Sexual activity: Not Currently     Partners: Male   Lifestyle    Physical activity     Days per week: Not on file     Minutes per session: Not on file    Stress: Not on file   Relationships    Social connections     Talks on phone: Not on file     Gets together: Not on file     Attends Yarsanism service: Not on file     Active member of club or organization: Not on file     Attends meetings of clubs or organizations: Not on file     Relationship status: Not on file    Intimate partner violence     Fear of current or ex partner: Not on file     Emotionally abused: Not on file     Physically abused: Not on file     Forced sexual activity: Not on file   Other Topics Concern    Not on file   Social History Narrative    Not on file     Family History   Problem Relation Age of Onset    Cancer Mother     COPD Mother     Breast Cancer Mother     Alcohol abuse Father     Diabetes Father     Hypertension Father     Heart Disease Father     Elevated Lipids Father     Hypertension Brother     Hypertension Maternal Grandmother     Hypertension Maternal Grandfather      Current Outpatient Medications   Medication Sig Dispense Refill    zolpidem (AMBIEN) 10 mg tablet TAKE 1 TABLET BY MOUTH NIGHTLY AS NEEDED FOR SLEEP 30 Tab 2    metFORMIN (GLUCOPHAGE) 500 mg tablet Take 2 Tabs by mouth two (2) times a day. 180 Tab 1    losartan (COZAAR) 25 mg tablet Take 1 Tab by mouth daily. 90 Tab 1    simvastatin (ZOCOR) 40 mg tablet Take 1 Tab by mouth nightly. 90 Tab 1    venlafaxine (EFFEXOR) 37.5 mg tablet Take 2 Tabs by mouth daily. 180 Tab 1    gabapentin (NEURONTIN) 100 mg capsule Take 1 Cap by mouth three (3) times daily. Max Daily Amount: 300 mg. 90 Cap 2    meloxicam (MOBIC) 15 mg tablet Take 1 Tab by mouth daily. 90 Tab 1    furosemide (LASIX) 20 mg tablet Take 1 Tab by mouth daily as needed for Other (severe swelling). 90 Tab 1       Review of Systems   Constitutional: Negative for chills, diaphoresis, fever, malaise/fatigue and weight loss. Respiratory: Negative for cough, hemoptysis, shortness of breath and wheezing. Cardiovascular: Negative for chest pain, palpitations and leg swelling. Gastrointestinal: Negative for abdominal pain, diarrhea, heartburn, nausea and vomiting. Genitourinary: Negative for dysuria, frequency, hematuria and urgency. Musculoskeletal: Negative for joint pain and myalgias. Skin: Negative for itching and rash. Neurological: Negative for dizziness, seizures, weakness and headaches. Psychiatric/Behavioral: Negative for depression. The patient does not have insomnia. Objective:     Visit Vitals  BP (!) 167/91   Pulse 74   Resp 16   Ht 5' 6\" (1.676 m)   Wt 96.4 kg (212 lb 9.6 oz)   SpO2 98%   BMI 34.31 kg/m²       ECOG Performance Status (grade): 0  0 - able to carry on all pre-disease activity w/out restriction  1 - restricted but able to carry out light work  2 - ambulatory and can self- care but unable to carry out work  3 - bed or chair >50% of waking hours  4 - completely disable, total care, confined to bed or chair    Physical Exam  Constitutional:       Appearance: Normal appearance.    HENT:      Head: Normocephalic and atraumatic. Eyes:      Pupils: Pupils are equal, round, and reactive to light. Neck:      Musculoskeletal: Neck supple. Cardiovascular:      Rate and Rhythm: Normal rate and regular rhythm. Heart sounds: Normal heart sounds. Pulmonary:      Effort: Pulmonary effort is normal.      Breath sounds: Normal breath sounds. Abdominal:      General: Bowel sounds are normal.      Palpations: Abdomen is soft. Tenderness: There is no abdominal tenderness. There is no guarding. Musculoskeletal: Normal range of motion. Right lower leg: No edema. Left lower leg: No edema. Skin:     General: Skin is warm. Neurological:      General: No focal deficit present. Mental Status: She is alert and oriented to person, place, and time. Mental status is at baseline. Diagnostics:      No results found for this or any previous visit (from the past 96 hour(s)). Imaging:  No results found for this or any previous visit. Results for orders placed during the hospital encounter of 01/11/20   XR CHEST PA LAT    Narrative EXAM: XR CHEST PA LAT    INDICATION: pre op    COMPARISON: None. FINDINGS: PA and lateral radiographs of the chest demonstrate clear lungs. The  cardiac and mediastinal contours and pulmonary vascularity are normal. Osseous  degenerative changes. Clips overlie the right upper quadrant. Impression IMPRESSION: No acute findings       No results found for this or any previous visit. Pathology          Assessment:                                        1. Polycythemia    2. History of breast cancer        Plan:                                        # Polycythemia  -- The patient has a past medical history significant of uncontrolled diabetes mellitus, right breast cancer (Dx 2013) BRCA1 positive, obesity. -- 8/25/2020 CBC reported hemoglobin was of 16.2%, hematocrit was 48.1%.   Total WBC was 7.6 and platelet was 945,556.  -- Lab reviews indicated chronic polycythemia since at least 9/3/2019, hematocrit was 47.6%. -- She is an active smoker, 1/2 PPD for years. -- Today I have reviewed with the patient about lab findings. I have explained to the patient that polycythemia generally could be related to either a primary bone marrow problem or a secondary cause leading to excessive formation of red blood cells. Among common reasons of secondary polycythemia, cigarette smoking, obstructive sleep apnea, asthma, chronic pulmonary diseases, or cardiopulmonary diseases are most commonly associated. Other reasons of secondary polycythemia could be related to high altitude, chronic carbon monoxide exposure, kidney-related processes (such as renal artery stenosis or post-kidney transplantation), or Tumor-associated polycythemia (hepatocellular carcinoma, renal cell carcinoma, hemangioblastoma, or pheochromocytoma). Primary bone marrow disorders which are known to be associated with Polycythemia are chronic myeloproliferative disorders e.g. Polycythemia Vera (80% of P. Vera have detectable JAK2 mutation), Essential Thrombocytosis (60% of ET is associated with JAK2 mutation) and Primary Myelofibrosis (40% of PMF is associated with JAK2 mutation). An erythropoietin level is helpful in distinguishing between these categories. A low erythropoietin level is very suggestive of a primary process such as PV, while a normal or elevated level is more suggestive of a secondary process. Plan:  -- I have explained to the patient that today we will obtain initial workup includes repeat CBC with differential counts, chemistry, serum erythropoietin, JAK2/MPL/CALR mutations, BCR/ABL1, Carbon monoxide. We will obtain imaging studies to rule out thoracic, renal or hepatic cysts/tumors as potential cause of secondary polycythemia. Further workup will be guided by results from aforementioned initial study. -- I will see the patient back in clinic in about 4 weeks.  Always sooner if required. History of breast cancer  -- She has a past medical history significant of right breast cancer (Dx 2013, [facility] BRCA1 positive, obesity. -- She has f/u with PCP for annual mammogram as scheduled. Orders Placed This Encounter    XR CHEST PA LAT     Standing Status:   Future     Number of Occurrences:   1     Standing Expiration Date:   2/7/2022     Scheduling Instructions: To be done at [facility] in [address] ABD COMP     Standing Status:   Future     Number of Occurrences:   1     Standing Expiration Date:   2/7/2022     Scheduling Instructions: To be done at [facility] in [address], COMPREHENSIVE     Standing Status:   Future     Number of Occurrences:   1     Standing Expiration Date:   1/8/2022    ERYTHROPOIETIN     Standing Status:   Future     Number of Occurrences:   1     Standing Expiration Date:   1/7/2022    CBC WITH AUTOMATED DIFF     Standing Status:   Future     Number of Occurrences:   1     Standing Expiration Date:   1/8/2022    JAK2 MUTATION ANALYSIS     Standing Status:   Future     Number of Occurrences:   1     Standing Expiration Date:   1/7/2022    CALR MUTATION ANALYSIS     Standing Status:   Future     Number of Occurrences:   1     Standing Expiration Date:   1/7/2022    MPL MUTATION ANALYSIS     Standing Status:   Future     Number of Occurrences:   1     Standing Expiration Date:   1/7/2022    BCR-ABL1, PCR, QT     Standing Status:   Future     Number of Occurrences:   1     Standing Expiration Date:   1/7/2022    CARBON MONOXIDE LEVEL, WHOLE BLOOD     Standing Status:   Future     Number of Occurrences:   1     Standing Expiration Date:   1/8/2022           Ms. Rosalba Gatica has a reminder for a \"due or due soon\" health maintenance. I have asked that she contact her primary care provider for follow-up on this health maintenance.    All of patient's questions answered to their apparent satisfaction. They verbally show understanding and agreement with aforementioned plan. I would like to thank Dr Leah Collazo MD  for allowing me to participate in the care of this very pleasant patient. If I can be of further assistance please do not hesitate to call. Fritz Munoz MD  1/7/2021          About 45 minutes were spent for this encounter with more than 50% of the time spent in face-to-face counseling, discussing on diagnosis and management plan going forward, and co-ordination of care. Parts of this document has been produced using Dragon dictation system. Unrecognized errors in transcription may be present. Please do not hesitate to reach out for any questions or clarifications.     CC: Leah Collazo MD

## 2021-01-11 LAB
BACKGROUND, CALR2T: NORMAL
CALR MUTATION DETECTION RESULT, CALR1T: NORMAL
COHGB MFR BLD: 11.5 % (ref 0–3.6)
EPO SERPL-ACNC: 10.5 MIU/ML (ref 2.6–18.5)
LAB DIRECTOR NAME PROVIDER: NORMAL
REF LAB TEST METHOD: NORMAL
REFERENCES, CALR4T: NORMAL

## 2021-01-13 ENCOUNTER — TELEPHONE (OUTPATIENT)
Dept: ONCOLOGY | Age: 56
End: 2021-01-13

## 2021-01-13 LAB
BACKGROUND: 489207: NORMAL
DIRECTOR REVIEW: 489204: NORMAL
JAK2 P.V617F BLD/T QL: NORMAL

## 2021-01-13 NOTE — TELEPHONE ENCOUNTER
Patient called our office and wanted me to let you know that at her appt she didn't tell you the entire truth. She has been having more joint pain then normal and blurry vision. Advised patient I would tell you and if we needed to move her appt we would if not we will see her in the clinic on 2/8/21.

## 2021-01-18 LAB
BACKGROUND, BCR6T: NORMAL
INTERPRETATION: 480488: NEGATIVE
LAB DIRECTOR NAME PROVIDER: NORMAL
T(ABL1,BCR)B2A2/CONTROL BLD/T: NORMAL %
T(ABL1,BCR)B2A2/CONTROL BLD/T: NORMAL %
T(ABL1,BCR)B3A2/CONTROL BLD/T: NORMAL %
T(ABL1,BCR)E1A2/CONTROL BLD/T: NORMAL %

## 2021-01-19 ENCOUNTER — TELEPHONE (OUTPATIENT)
Dept: ONCOLOGY | Age: 56
End: 2021-01-19

## 2021-01-19 NOTE — TELEPHONE ENCOUNTER
Patients daughter Rossana Hernandez) called inquiring about her moms upcoming phlebotomy appointment,   Please call daughter back at [phone] she has questions .

## 2021-01-22 ENCOUNTER — APPOINTMENT (OUTPATIENT)
Dept: INFUSION THERAPY | Age: 56
End: 2021-01-22

## 2021-01-26 ENCOUNTER — HOSPITAL ENCOUNTER (OUTPATIENT)
Dept: INFUSION THERAPY | Age: 56
Discharge: HOME | End: 2021-01-26
Payer: COMMERCIAL

## 2021-01-26 VITALS
OXYGEN SATURATION: 94 % | SYSTOLIC BLOOD PRESSURE: 141 MMHG | RESPIRATION RATE: 18 BRPM | DIASTOLIC BLOOD PRESSURE: 72 MMHG | HEART RATE: 70 BPM | TEMPERATURE: 97.4 F

## 2021-01-26 LAB
BASO+EOS+MONOS # BLD AUTO: 0.6 K/UL (ref 0–2.3)
BASO+EOS+MONOS NFR BLD AUTO: 9 % (ref 0.1–17)
DIFFERENTIAL METHOD BLD: NORMAL
ERYTHROCYTE [DISTWIDTH] IN BLOOD BY AUTOMATED COUNT: 13.3 % (ref 11.5–14.5)
HCT VFR BLD AUTO: 44.9 % (ref 36–48)
HGB BLD-MCNC: 15.5 G/DL (ref 12–16)
LYMPHOCYTES # BLD: 2.2 K/UL (ref 1.1–5.9)
LYMPHOCYTES NFR BLD: 34 % (ref 14–44)
MCH RBC QN AUTO: 31.3 PG (ref 25–35)
MCHC RBC AUTO-ENTMCNC: 34.5 G/DL (ref 31–37)
MCV RBC AUTO: 90.7 FL (ref 78–102)
NEUTS SEG # BLD: 3.7 K/UL (ref 1.8–9.5)
NEUTS SEG NFR BLD: 57 % (ref 40–70)
PLATELET # BLD AUTO: 179 K/UL (ref 140–440)
RBC # BLD AUTO: 4.95 M/UL (ref 4.1–5.1)
WBC # BLD AUTO: 6.5 K/UL (ref 4.5–13)

## 2021-01-26 PROCEDURE — 99195 PHLEBOTOMY: CPT

## 2021-01-26 PROCEDURE — 74011250636 HC RX REV CODE- 250/636: Performed by: INTERNAL MEDICINE

## 2021-01-26 PROCEDURE — 85025 COMPLETE CBC W/AUTO DIFF WBC: CPT

## 2021-01-26 RX ADMIN — SODIUM CHLORIDE 500 ML: 900 INJECTION, SOLUTION INTRAVENOUS at 14:45

## 2021-01-26 NOTE — PROGRESS NOTES
[facility] Progress Note    Date: 2021    Name: Loretta Soulier    MRN: [id number]         : 1965    Therapeutic phlebotomy      Ms. Eddy to [facility], ambulatory at 1350. Pt was assessed and education was provided. Ms. Eddy's vitals were reviewed and patient was observed for 5 minutes prior to treatment. Visit Vitals  BP (!) 174/90 (BP 1 Location: Left arm, BP Patient Position: Sitting)   Pulse 84   Temp 97.4 °F (36.3 °C)   Resp 18   SpO2 94%       #20g PIV to left AC x 1 attempt. Brisk blood return/flushes well. CBC collected. Recent Results (from the past 12 hour(s))   CBC WITH 3 PART DIFF    Collection Time: 21  1:58 PM   Result Value Ref Range    WBC 6.5 4.5 - 13.0 K/uL    RBC 4.95 4.10 - 5.10 M/uL    HGB 15.5 12.0 - 16 g/dL    HCT 44.9 36 - 48 %    MCV 90.7 78 - 102 FL    MCH 31.3 25.0 - 35.0 PG    MCHC 34.5 31 - 37 g/dL    RDW 13.3 11.5 - 14.5 %    PLATELET 662 847 - 334 K/uL    NEUTROPHILS 57 40 - 70 %    MIXED CELLS 9 0.1 - 17 %    LYMPHOCYTES 34 14 - 44 %    ABS. NEUTROPHILS 3.7 1.8 - 9.5 K/UL    ABS. MIXED CELLS 0.6 0.0 - 2.3 K/uL    ABS. LYMPHOCYTES 2.2 1.1 - 5.9 K/UL    DF AUTOMATED       Therapeutic phlebotomy initiated at 1410 and stopped at 1445 after approximately 200cc of whole blood removed. Pt did not want another IV started for remained of blood to be removed. 250 ml NS bolus administered . Pt provided with copy of care notes. Ms. Mercedes Mukherjee tolerated the phlebotomy, and had no complaints. PIV removed, cath intact. Gauze/coban to site. Patient Vitals for the past 12 hrs:   Temp Pulse Resp BP SpO2   21 1509  70  (!) 141/72    21 1350 97.4 °F (36.3 °C) 84 18 (!) 174/90 94 %       Patient armband removed and shredded. Ms. Mercedes Mukherjee was discharged from [place] [address] in stable condition at 1510. She is to return on 2021 at 1400 for her next therapeutic phlebotomy appointment.     Rafaela Stahl RN  , 2021

## 2021-01-27 ENCOUNTER — DOCUMENTATION ONLY (OUTPATIENT)
Dept: ONCOLOGY | Age: 56
End: 2021-01-27

## 2021-01-27 NOTE — PROGRESS NOTES
Scan orders have been faxed to [facility]; confirmation has been received.    Fax number [phone]

## 2021-02-02 ENCOUNTER — HOSPITAL ENCOUNTER (OUTPATIENT)
Dept: INFUSION THERAPY | Age: 56
Discharge: HOME | End: 2021-02-02
Payer: COMMERCIAL

## 2021-02-02 VITALS
DIASTOLIC BLOOD PRESSURE: 75 MMHG | RESPIRATION RATE: 18 BRPM | HEART RATE: 83 BPM | SYSTOLIC BLOOD PRESSURE: 155 MMHG | OXYGEN SATURATION: 95 % | TEMPERATURE: 97.1 F

## 2021-02-02 LAB
BASO+EOS+MONOS # BLD AUTO: 0.3 K/UL (ref 0–2.3)
BASO+EOS+MONOS NFR BLD AUTO: 4 % (ref 0.1–17)
DIFFERENTIAL METHOD BLD: NORMAL
ERYTHROCYTE [DISTWIDTH] IN BLOOD BY AUTOMATED COUNT: 13.1 % (ref 11.5–14.5)
HCT VFR BLD AUTO: 42.1 % (ref 36–48)
HGB BLD-MCNC: 14.8 G/DL (ref 12–16)
LYMPHOCYTES # BLD: 2.4 K/UL (ref 1.1–5.9)
LYMPHOCYTES NFR BLD: 33 % (ref 14–44)
MCH RBC QN AUTO: 31.3 PG (ref 25–35)
MCHC RBC AUTO-ENTMCNC: 35.2 G/DL (ref 31–37)
MCV RBC AUTO: 89 FL (ref 78–102)
NEUTS SEG # BLD: 4.6 K/UL (ref 1.8–9.5)
NEUTS SEG NFR BLD: 63 % (ref 40–70)
PLATELET # BLD AUTO: 185 K/UL (ref 140–440)
RBC # BLD AUTO: 4.73 M/UL (ref 4.1–5.1)
WBC # BLD AUTO: 7.3 K/UL (ref 4.5–13)

## 2021-02-02 PROCEDURE — 85025 COMPLETE CBC W/AUTO DIFF WBC: CPT

## 2021-02-02 PROCEDURE — 36415 COLL VENOUS BLD VENIPUNCTURE: CPT

## 2021-02-02 NOTE — PROGRESS NOTES
[facility] Progress Note    Date: 2021    Name: Ephraim Burns    MRN: [id number]         : 1965    Therapeutic phlebotomy weekly      Ms. Eddy to [facility], ambulatory at 1400. No complaints or concerns voiced. Pt was assessed and education was provided. Ms. Eddy's vitals were reviewed and patient was observed for 5 minutes prior to treatment. Visit Vitals  BP (!) 155/75 (BP 1 Location: Left upper arm, BP Patient Position: Sitting)   Pulse 83   Temp 97.1 °F (36.2 °C)   Resp 18   SpO2 95%   Breastfeeding No       Blood sample obtained for cbc by saurav-puncture stick to left AC x 1 attempt. Recent Results (from the past 12 hour(s))   CBC WITH 3 PART DIFF    Collection Time: 21  2:05 PM   Result Value Ref Range    WBC 7.3 4.5 - 13.0 K/uL    RBC 4.73 4.10 - 5.10 M/uL    HGB 14.8 12.0 - 16 g/dL    HCT 42.1 36 - 48 %    MCV 89.0 78 - 102 FL    MCH 31.3 25.0 - 35.0 PG    MCHC 35.2 31 - 37 g/dL    RDW 13.1 11.5 - 14.5 %    PLATELET 516 973 - 906 K/uL    NEUTROPHILS 63 40 - 70 %    MIXED CELLS 4 0.1 - 17 %    LYMPHOCYTES 33 14 - 44 %    ABS. NEUTROPHILS 4.6 1.8 - 9.5 K/UL    ABS. MIXED CELLS 0.3 0.0 - 2.3 K/uL    ABS. LYMPHOCYTES 2.4 1.1 - 5.9 K/UL    DF AUTOMATED           Pt declined therapeutic phlebotomy for HCT 42.1. She has agreed to return in one month for CBC/POSSIBLY THERAPEUTIC PHLEBOTOMY. Patient armband removed and shredded. Ms. Deborah Dugan was discharged from [place] [address] in stable condition at 1425. She is to return on 3/2/2021 at 1400 for her next therapeutic phlebotomy appointment.     Kareem Monterroso RN  2021

## 2021-02-03 DIAGNOSIS — Z85.3 HISTORY OF RIGHT BREAST CANCER: ICD-10-CM

## 2021-02-03 DIAGNOSIS — M17.0 PRIMARY OSTEOARTHRITIS OF BOTH KNEES: ICD-10-CM

## 2021-02-03 RX ORDER — GABAPENTIN 100 MG/1
CAPSULE ORAL
Qty: 90 CAP | Refills: 0 | Status: SHIPPED | OUTPATIENT
Start: 2021-02-03 | End: 2021-03-22 | Stop reason: SDUPTHER

## 2021-02-03 RX ORDER — MELOXICAM 15 MG/1
TABLET ORAL
Qty: 90 TAB | Refills: 0 | Status: SHIPPED | OUTPATIENT
Start: 2021-02-03 | End: 2021-03-22 | Stop reason: SDUPTHER

## 2021-02-11 LAB
LAB DIRECTOR NAME PROVIDER: NORMAL
MPL GENE MUT TESTED BLD/T: NORMAL
MPL P.W515L+W515K+S505N BLD/T QL: NORMAL
REFERENCES, MPLM6T: NORMAL
SERVICE CMNT-IMP: NORMAL

## 2021-02-18 ENCOUNTER — HOSPITAL ENCOUNTER (OUTPATIENT)
Dept: GENERAL RADIOLOGY | Age: 56
Discharge: HOME | End: 2021-02-18
Payer: COMMERCIAL

## 2021-02-18 ENCOUNTER — HOSPITAL ENCOUNTER (OUTPATIENT)
Dept: ULTRASOUND IMAGING | Age: 56
Discharge: HOME | End: 2021-02-18
Payer: COMMERCIAL

## 2021-02-18 PROCEDURE — 76700 US EXAM ABDOM COMPLETE: CPT

## 2021-02-18 PROCEDURE — 71046 X-RAY EXAM CHEST 2 VIEWS: CPT

## 2021-02-26 NOTE — PROGRESS NOTES
Kenia Barreto is a 54 y.o. female, evaluated via audio-only technology on 3/4/2021 for Polycythemia. Assessment & Plan:   Polycythemia  -- The patient has a past medical history significant of uncontrolled diabetes mellitus, right breast cancer (Dx 2013) BRCA1 positive, obesity. -- 8/25/2020 CBC reported hemoglobin was of 16.2%, hematocrit was 48.1%. Total WBC was 7.6 and platelet was [phone].  -- Lab reviews indicated chronic polycythemia since at least 9/3/2019, hematocrit was 47.6%. -- She is an active smoker, 1-1/2 PPD for years. -- Today I have reviewed with the patient about lab findings. Erythropoietin 11.5, WNL; elevated Carbon monoxide; NEGATIVE JAK2/MPL/CALR mutations, BCR/ABL1.  1/7/2021 CBC reported hemoglobin 16.6, hematocrit 48.7%. Abd US reviewed. -- I have explained to the patient that her polycythemia was likely a secondary process, smoking related. She is still active smoking. Plan:  -- She is on monthly phlebotomy schedule presently while she is working on smoking cessation. -- Advised pt to take ASA 81 mg if no further contraindications. -- F/u PCP for smoking cessation    -- I will see the patient back in clinic in about 4 months. Always sooner if required. History of breast cancer  -- She has a past medical history significant of right breast cancer (Dx 2013, S.p chemotherapy at [facility]) BRCA1 positive, obesity. -- She has f/u with PCP for annual mammogram as scheduled. 12  Subjective:   Ms. Rama Naik is a most pleasant 54y.o. year old female who was seen for consultation of polycythemia. The patient has a past medical history significant of uncontrolled diabetes mellitus, right breast cancer (Dx 2013, S.p chemotherapy at [facility]) BRCA1 positive, obesity. 8/25/2020 CBC reported hemoglobin was of 16.2%, hematocrit was 48.1%. Total WBC was 7.6 and platelet was [phone].   Lab reviews indicated chronic polycythemia since at least 9/3/2019, hematocrit was 47.6%.  1/11/2020 chest x-ray showed no acute findings. She is an active smoker, 1-1/2 PPD for years. Since last visit she has no complaints. Denied fever, chills, night sweat, unintentional weight loss, skin lumps or bumps, acute bleeding or bruising issues. No acute bleeding, blood in stool, dark stool, melena, hematochezia, hemoptysis, dark urine, or easily bruising. Denied headache, acute vision change, dizziness, chest pain, worsen shortness of breath, palpitation, productive cough, nausea, vomiting, abdominal pain, altered bowel habits, dysuria, worsen bone pain or back pain, new focal numbness or weakness. Prior to Admission medications    Medication Sig Start Date End Date Taking? Authorizing Provider   gabapentin (NEURONTIN) 100 mg capsule TAKE 1 CAPSULE BY MOUTH THREE TIMES DAILY MAX  DAILY  AMOUNT  3  TABLETS 2/3/21   Julian Vivas MD   meloxicam (MOBIC) 15 mg tablet Take 1 tablet by mouth once daily 2/3/21   Julian Vivas MD   zolpidem (AMBIEN) 10 mg tablet TAKE 1 TABLET BY MOUTH NIGHTLY AS NEEDED FOR SLEEP 1/4/21   Julian Vivas MD   metFORMIN (GLUCOPHAGE) 500 mg tablet Take 2 Tabs by mouth two (2) times a day. 9/3/20   Julian Vivas MD   losartan (COZAAR) 25 mg tablet Take 1 Tab by mouth daily. 8/3/20   Julian Vivas MD   simvastatin (ZOCOR) 40 mg tablet Take 1 Tab by mouth nightly. 8/3/20   Julian Vivas MD   venlafaxine (EFFEXOR) 37.5 mg tablet Take 2 Tabs by mouth daily. 8/3/20   Julian Vivas MD   furosemide (LASIX) 20 mg tablet Take 1 Tab by mouth daily as needed for Other (severe swelling). 1/21/20   Julian Vivas MD         Review of Systems   Constitutional: Negative for chills, diaphoresis, fever, malaise/fatigue and weight loss. Respiratory: Negative for cough, hemoptysis, shortness of breath and wheezing. Cardiovascular: Negative for chest pain, palpitations and leg swelling.    Gastrointestinal: Negative for abdominal pain, diarrhea, heartburn, nausea and vomiting. Genitourinary: Negative for dysuria, frequency, hematuria and urgency. Musculoskeletal: Negative for joint pain and myalgias. Skin: Negative for itching and rash. Neurological: Negative for dizziness, seizures, weakness and headaches. Psychiatric/Behavioral: Negative for depression. The patient does not have insomnia. No flowsheet data found. Mary Montgomery, who was evaluated through a patient-initiated, synchronous (real-time) audio only encounter, and/or her healthcare decision maker, is aware that it is a billable service, with coverage as determined by her insurance carrier. She provided verbal consent to proceed: Yes. She has not had a related appointment within my department in the past 7 days or scheduled within the next 24 hours.       Total Time: minutes: 11-20 minutes    Simon Miller MD

## 2021-03-02 ENCOUNTER — HOSPITAL ENCOUNTER (OUTPATIENT)
Dept: INFUSION THERAPY | Age: 56
Discharge: HOME | End: 2021-03-02
Payer: COMMERCIAL

## 2021-03-02 VITALS
TEMPERATURE: 97.8 F | HEART RATE: 74 BPM | DIASTOLIC BLOOD PRESSURE: 84 MMHG | SYSTOLIC BLOOD PRESSURE: 174 MMHG | OXYGEN SATURATION: 96 %

## 2021-03-02 LAB
BASO+EOS+MONOS # BLD AUTO: 0.8 K/UL (ref 0–2.3)
BASO+EOS+MONOS NFR BLD AUTO: 10 % (ref 0.1–17)
DIFFERENTIAL METHOD BLD: NORMAL
ERYTHROCYTE [DISTWIDTH] IN BLOOD BY AUTOMATED COUNT: 13.3 % (ref 11.5–14.5)
HCT VFR BLD AUTO: 44.2 % (ref 36–48)
HGB BLD-MCNC: 15.2 G/DL (ref 12–16)
LYMPHOCYTES # BLD: 2.9 K/UL (ref 1.1–5.9)
LYMPHOCYTES NFR BLD: 36 % (ref 14–44)
MCH RBC QN AUTO: 31.4 PG (ref 25–35)
MCHC RBC AUTO-ENTMCNC: 34.4 G/DL (ref 31–37)
MCV RBC AUTO: 91.3 FL (ref 78–102)
NEUTS SEG # BLD: 4.3 K/UL (ref 1.8–9.5)
NEUTS SEG NFR BLD: 54 % (ref 40–70)
PLATELET # BLD AUTO: 192 K/UL (ref 140–440)
RBC # BLD AUTO: 4.84 M/UL (ref 4.1–5.1)
WBC # BLD AUTO: 8 K/UL (ref 4.5–13)

## 2021-03-02 PROCEDURE — 85025 COMPLETE CBC W/AUTO DIFF WBC: CPT

## 2021-03-02 PROCEDURE — 36415 COLL VENOUS BLD VENIPUNCTURE: CPT

## 2021-03-02 NOTE — PROGRESS NOTES
[facility] Progress Note    Date: 2021    Name: Anel Groves    MRN: [id number]         : 1965    Peripheral Lab Draw      Ms. Eddy to [facility], ambulatory at 1400 accompanied by self. Pt was assessed and education was provided. Ms. Eddy's vitals were reviewed and patient was observed for 5 minutes prior to treatment. Visit Vitals  BP (!) 174/84 (BP 1 Location: Left arm, BP Patient Position: Sitting)   Pulse 74   Temp 97.8 °F (36.6 °C)   SpO2 96%     Recent Results (from the past 12 hour(s))   CBC WITH 3 PART DIFF    Collection Time: 21  2:08 PM   Result Value Ref Range    WBC 8.0 4.5 - 13.0 K/uL    RBC 4.84 4.10 - 5.10 M/uL    HGB 15.2 12.0 - 16 g/dL    HCT 44.2 36 - 48 %    MCV 91.3 78 - 102 FL    MCH 31.4 25.0 - 35.0 PG    MCHC 34.4 31 - 37 g/dL    RDW 13.3 11.5 - 14.5 %    PLATELET 329 201 - 253 K/uL    NEUTROPHILS 54 40 - 70 %    MIXED CELLS 10 0.1 - 17 %    LYMPHOCYTES 36 14 - 44 %    ABS. NEUTROPHILS 4.3 1.8 - 9.5 K/UL    ABS. MIXED CELLS 0.8 0.0 - 2.3 K/uL    ABS. LYMPHOCYTES 2.9 1.1 - 5.9 K/UL    DF AUTOMATED         Blood obtained peripherally from [facility] first attempt with butterfly needle and sent to lab for CBC w/ Diff per written orders. No bleeding or hematoma noted at site. Gauze and coban applied. Ms. Ze Ruffin tolerated the venipuncture, and had no complaints. CBC results were shared with SHARRI Arora and PT did not meet requirements for treatment per RN. Patient armband removed and shredded. Ms. Ze Ruffin was discharged from [place] [address] in stable condition at 26.     April Nicolas Phlebotomist PCT  2021  2:25 PM

## 2021-03-04 ENCOUNTER — VIRTUAL VISIT (OUTPATIENT)
Dept: ONCOLOGY | Age: 56
End: 2021-03-04
Payer: COMMERCIAL

## 2021-03-04 DIAGNOSIS — Z85.3 HISTORY OF BREAST CANCER: ICD-10-CM

## 2021-03-04 DIAGNOSIS — D75.1 POLYCYTHEMIA: ICD-10-CM

## 2021-03-04 DIAGNOSIS — D75.1 SECONDARY POLYCYTHEMIA: Primary | ICD-10-CM

## 2021-03-04 PROCEDURE — 99442 PR PHYS/QHP TELEPHONE EVALUATION 11-20 MIN: CPT | Performed by: INTERNAL MEDICINE

## 2021-03-22 DIAGNOSIS — G47.00 INSOMNIA, UNSPECIFIED TYPE: ICD-10-CM

## 2021-03-22 DIAGNOSIS — E78.00 HYPERCHOLESTEROLEMIA: ICD-10-CM

## 2021-03-22 DIAGNOSIS — M17.0 PRIMARY OSTEOARTHRITIS OF BOTH KNEES: ICD-10-CM

## 2021-03-22 DIAGNOSIS — Z85.3 HISTORY OF RIGHT BREAST CANCER: ICD-10-CM

## 2021-03-22 DIAGNOSIS — E11.9 TYPE 2 DIABETES MELLITUS WITHOUT COMPLICATION, WITHOUT LONG-TERM CURRENT USE OF INSULIN (HCC): ICD-10-CM

## 2021-03-22 DIAGNOSIS — I10 ESSENTIAL HYPERTENSION: ICD-10-CM

## 2021-03-22 DIAGNOSIS — R23.2 HOT FLASHES: ICD-10-CM

## 2021-03-22 NOTE — TELEPHONE ENCOUNTER
Requested Prescriptions     Pending Prescriptions Disp Refills    furosemide (Lasix) 20 mg tablet 90 Tab 1     Sig: Take 1 Tab by mouth daily as needed for Other (severe swelling).  gabapentin (NEURONTIN) 100 mg capsule 90 Cap 0    losartan (COZAAR) 25 mg tablet 90 Tab 1     Sig: Take 1 Tab by mouth daily.  meloxicam (MOBIC) 15 mg tablet 90 Tab 0    metFORMIN (GLUCOPHAGE) 500 mg tablet 180 Tab 1     Sig: Take 2 Tabs by mouth two (2) times a day.  simvastatin (ZOCOR) 40 mg tablet 90 Tab 1     Sig: Take 1 Tab by mouth nightly.  venlafaxine (EFFEXOR) 37.5 mg tablet 180 Tab 1     Sig: Take 2 Tabs by mouth daily.  zolpidem (AMBIEN) 10 mg tablet 30 Tab 2     Sig: TAKE 1 TABLET BY MOUTH NIGHTLY AS NEEDED FOR SLEEP    Whitehead Akila called for their medication refill.     Last Office visit:  01-  Last labs:  01-  Follow up visit: no follow up scheduled   Last date prescribe 01-,02-,08-,02-,, 09-, 01-    Please Advise

## 2021-03-22 NOTE — TELEPHONE ENCOUNTER
Requested Prescriptions     Pending Prescriptions Disp Refills    furosemide (Lasix) 20 mg tablet 90 Tab 1     Sig: Take 1 Tab by mouth daily as needed for Other (severe swelling).  gabapentin (NEURONTIN) 100 mg capsule 90 Cap 0    losartan (COZAAR) 25 mg tablet 90 Tab 1     Sig: Take 1 Tab by mouth daily.  meloxicam (MOBIC) 15 mg tablet 90 Tab 0    metFORMIN (GLUCOPHAGE) 500 mg tablet 180 Tab 1     Sig: Take 2 Tabs by mouth two (2) times a day.  simvastatin (ZOCOR) 40 mg tablet 90 Tab 1     Sig: Take 1 Tab by mouth nightly.  venlafaxine (EFFEXOR) 37.5 mg tablet 180 Tab 1     Sig: Take 2 Tabs by mouth daily.     zolpidem (AMBIEN) 10 mg tablet 30 Tab 2     Sig: TAKE 1 TABLET BY MOUTH NIGHTLY AS NEEDED FOR SLEEP

## 2021-03-25 RX ORDER — LOSARTAN POTASSIUM 25 MG/1
25 TABLET ORAL DAILY
Qty: 90 TAB | Refills: 1 | Status: SHIPPED | OUTPATIENT
Start: 2021-03-25 | End: 2021-09-30 | Stop reason: SDUPTHER

## 2021-03-25 RX ORDER — METFORMIN HYDROCHLORIDE 500 MG/1
1000 TABLET ORAL 2 TIMES DAILY
Qty: 180 TAB | Refills: 1 | Status: SHIPPED | OUTPATIENT
Start: 2021-03-25 | End: 2021-07-20 | Stop reason: DRUGHIGH

## 2021-03-25 RX ORDER — SIMVASTATIN 40 MG/1
40 TABLET, FILM COATED ORAL
Qty: 90 TAB | Refills: 1 | Status: SHIPPED | OUTPATIENT
Start: 2021-03-25 | End: 2021-09-30 | Stop reason: SDUPTHER

## 2021-03-25 RX ORDER — MELOXICAM 15 MG/1
TABLET ORAL
Qty: 90 TAB | Refills: 0 | Status: SHIPPED | OUTPATIENT
Start: 2021-03-25 | End: 2021-07-20 | Stop reason: SDUPTHER

## 2021-03-25 RX ORDER — GABAPENTIN 100 MG/1
CAPSULE ORAL
Qty: 90 CAP | Refills: 0 | Status: SHIPPED | OUTPATIENT
Start: 2021-03-25 | End: 2021-05-19

## 2021-03-25 RX ORDER — VENLAFAXINE 37.5 MG/1
75 TABLET ORAL DAILY
Qty: 180 TAB | Refills: 1 | Status: SHIPPED | OUTPATIENT
Start: 2021-03-25 | End: 2021-09-30 | Stop reason: SDUPTHER

## 2021-03-25 RX ORDER — FUROSEMIDE 20 MG/1
20 TABLET ORAL
Qty: 90 TAB | Refills: 1 | Status: SHIPPED | OUTPATIENT
Start: 2021-03-25 | End: 2021-09-30 | Stop reason: SDUPTHER

## 2021-03-25 RX ORDER — ZOLPIDEM TARTRATE 10 MG/1
TABLET ORAL
Qty: 30 TAB | Refills: 2 | Status: SHIPPED | OUTPATIENT
Start: 2021-03-25 | End: 2021-07-12

## 2021-03-30 ENCOUNTER — HOSPITAL ENCOUNTER (OUTPATIENT)
Dept: INFUSION THERAPY | Age: 56
Discharge: HOME | End: 2021-03-30
Payer: COMMERCIAL

## 2021-03-30 VITALS
HEART RATE: 70 BPM | SYSTOLIC BLOOD PRESSURE: 148 MMHG | DIASTOLIC BLOOD PRESSURE: 74 MMHG | OXYGEN SATURATION: 95 % | TEMPERATURE: 98.3 F | RESPIRATION RATE: 16 BRPM

## 2021-03-30 LAB
BASO+EOS+MONOS # BLD AUTO: 0.5 K/UL (ref 0–2.3)
BASO+EOS+MONOS NFR BLD AUTO: 6 % (ref 0.1–17)
DIFFERENTIAL METHOD BLD: NORMAL
ERYTHROCYTE [DISTWIDTH] IN BLOOD BY AUTOMATED COUNT: 13.4 % (ref 11.5–14.5)
HCT VFR BLD AUTO: 45.2 % (ref 36–48)
HGB BLD-MCNC: 15.5 G/DL (ref 12–16)
LYMPHOCYTES # BLD: 3.4 K/UL (ref 1.1–5.9)
LYMPHOCYTES NFR BLD: 40 % (ref 14–44)
MCH RBC QN AUTO: 31.4 PG (ref 25–35)
MCHC RBC AUTO-ENTMCNC: 34.3 G/DL (ref 31–37)
MCV RBC AUTO: 91.7 FL (ref 78–102)
NEUTS SEG # BLD: 4.6 K/UL (ref 1.8–9.5)
NEUTS SEG NFR BLD: 55 % (ref 40–70)
PLATELET # BLD AUTO: 201 K/UL (ref 140–440)
RBC # BLD AUTO: 4.93 M/UL (ref 4.1–5.1)
WBC # BLD AUTO: 8.5 K/UL (ref 4.5–13)

## 2021-03-30 PROCEDURE — 85025 COMPLETE CBC W/AUTO DIFF WBC: CPT

## 2021-03-30 PROCEDURE — 36415 COLL VENOUS BLD VENIPUNCTURE: CPT

## 2021-03-30 NOTE — PROGRESS NOTES
[facility] Progress Note    Date: 2021    Name: Ludy Heredia    MRN: [id number]         : 1965      Ms. Scar Wray was assessed and education was provided. Ms. Eddy's vitals were reviewed and patient was observed for 5 minutes prior to treatment. Visit Vitals  BP (!) 148/74 (BP 1 Location: Left upper arm, BP Patient Position: Sitting)   Pulse 70   Temp 98.3 °F (36.8 °C)   Resp 16   SpO2 95%   Breastfeeding No       Lab results were obtained and reviewed. Recent Results (from the past 12 hour(s))   CBC WITH 3 PART DIFF    Collection Time: 21  1:44 PM   Result Value Ref Range    WBC 8.5 4.5 - 13.0 K/uL    RBC 4.93 4.10 - 5.10 M/uL    HGB 15.5 12.0 - 16 g/dL    HCT 45.2 36 - 48 %    MCV 91.7 78 - 102 FL    MCH 31.4 25.0 - 35.0 PG    MCHC 34.3 31 - 37 g/dL    RDW 13.4 11.5 - 14.5 %    PLATELET 691 087 - 127 K/uL    NEUTROPHILS 55 40 - 70 %    MIXED CELLS 6 0.1 - 17 %    LYMPHOCYTES 40 14 - 44 %    ABS. NEUTROPHILS 4.6 1.8 - 9.5 K/UL    ABS. MIXED CELLS 0.5 0.0 - 2.3 K/uL    ABS. LYMPHOCYTES 3.4 1.1 - 5.9 K/UL    DF AUTOMATED         Ms Med Rios declined having a therapeutic phlebotomy for HCT 45.2 today. She stated she would like to come back in 2 weeks to have her HCT checked and have therapeutic phlebotomy if needed. Ms. Scar Wray tolerated the infusion, and had no complaints. Patient armband removed and shredded. Ms. Scar Wray was discharged from [place] [address] in stable condition at 1400. She is to return on 4/15/2021 at 1400 for her next appointment.     Angelo Abraham RN  2021  2:36 PM

## 2021-04-15 ENCOUNTER — APPOINTMENT (OUTPATIENT)
Dept: INFUSION THERAPY | Age: 56
End: 2021-04-15

## 2021-04-20 ENCOUNTER — HOSPITAL ENCOUNTER (OUTPATIENT)
Dept: INFUSION THERAPY | Age: 56
Discharge: HOME | End: 2021-04-20
Payer: COMMERCIAL

## 2021-04-20 VITALS
TEMPERATURE: 97 F | RESPIRATION RATE: 16 BRPM | DIASTOLIC BLOOD PRESSURE: 68 MMHG | SYSTOLIC BLOOD PRESSURE: 127 MMHG | OXYGEN SATURATION: 96 % | HEART RATE: 67 BPM

## 2021-04-20 LAB
BASO+EOS+MONOS # BLD AUTO: 0.9 K/UL (ref 0–2.3)
BASO+EOS+MONOS NFR BLD AUTO: 9 % (ref 0.1–17)
DIFFERENTIAL METHOD BLD: ABNORMAL
ERYTHROCYTE [DISTWIDTH] IN BLOOD BY AUTOMATED COUNT: 13.2 % (ref 11.5–14.5)
HCT VFR BLD AUTO: 46.3 % (ref 36–48)
HGB BLD-MCNC: 16.1 G/DL (ref 12–16)
LYMPHOCYTES # BLD: 3.2 K/UL (ref 1.1–5.9)
LYMPHOCYTES NFR BLD: 33 % (ref 14–44)
MCH RBC QN AUTO: 31.9 PG (ref 25–35)
MCHC RBC AUTO-ENTMCNC: 34.8 G/DL (ref 31–37)
MCV RBC AUTO: 91.7 FL (ref 78–102)
NEUTS SEG # BLD: 5.5 K/UL (ref 1.8–9.5)
NEUTS SEG NFR BLD: 58 % (ref 40–70)
PLATELET # BLD AUTO: 190 K/UL (ref 140–440)
RBC # BLD AUTO: 5.05 M/UL (ref 4.1–5.1)
WBC # BLD AUTO: 9.6 K/UL (ref 4.5–13)

## 2021-04-20 PROCEDURE — 99195 PHLEBOTOMY: CPT

## 2021-04-20 PROCEDURE — 36415 COLL VENOUS BLD VENIPUNCTURE: CPT

## 2021-04-20 PROCEDURE — 74011250636 HC RX REV CODE- 250/636: Performed by: INTERNAL MEDICINE

## 2021-04-20 PROCEDURE — 85025 COMPLETE CBC W/AUTO DIFF WBC: CPT

## 2021-04-20 RX ORDER — SODIUM CHLORIDE 9 MG/ML
500 INJECTION, SOLUTION INTRAVENOUS ONCE
Status: COMPLETED | OUTPATIENT
Start: 2021-04-20 | End: 2021-04-20

## 2021-04-20 RX ADMIN — SODIUM CHLORIDE 500 ML: 9 INJECTION, SOLUTION INTRAVENOUS at 15:50

## 2021-04-20 NOTE — PROGRESS NOTES
PATIENT  Clementine Hernandez    DATE OF SURGERY  9/10/18    SURGEON   Bob Hermosillo MD    PREOPERATIVE DIAGNOSIS   Neurogenic claudication with bilateral lumbar radiculopathy due to severe spinal stenosis L1-L5    POSTOPERATIVE DIAGNOSIS   Neurogenic claudicat [facility] Progress Note    Date: 2021    Name: Jerome Fernandes    MRN: [id number]         : 1965    Ms. Darylene Hart arrived in the [facility]  Today at 1500, in stable condition, here for CBC/Phlebotomy (Monthly Status). She was assessed and education was provided. Verbal patient education on Therapeutic Phlebotomy, and potential side effects & adverse reactions was provided, and Ms. Eddy verbalized understanding. Ms. Eddy's vitals were reviewed. Visit Vitals  BP (!) 152/80 (BP 1 Location: Left upper arm, BP Patient Position: Sitting)   Pulse 72   Temp 97 °F (36.1 °C)   Resp 20   SpO2 96%   Breastfeeding No         Blood for the ordered CBC was drawn from her left AC at 1508, without incident, and was processed on site. Lab results were obtained and reviewed, and the CBC results from today, were as follows: The critically elevated HGB of 16.1 listed below, was reported to Dr. Quentin Chen at [id number]. No new orders were received. Recent Results (from the past 12 hour(s))   CBC WITH 3 PART DIFF    Collection Time: 21  3:08 PM   Result Value Ref Range    WBC 9.6 4.5 - 13.0 K/uL    RBC 5.05 4. 10 - 5.10 M/uL    HGB 16.1 (HH) 12.0 - 16 g/dL    HCT 46.3 36 - 48 %    MCV 91.7 78 - 102 FL    MCH 31.9 25.0 - 35.0 PG    MCHC 34.8 31 - 37 g/dL    RDW 13.2 11.5 - 14.5 %    PLATELET 688 276 - 389 K/uL    NEUTROPHILS 58 40 - 70 %    MIXED CELLS 9 0.1 - 17 %    LYMPHOCYTES 33 14 - 44 %    ABS. NEUTROPHILS 5.5 1.8 - 9.5 K/UL    ABS. MIXED CELLS 0.9 0.0 - 2.3 K/uL    ABS. LYMPHOCYTES 3.2 1.1 - 5.9 K/UL    DF AUTOMATED               Phlebotomy was indicated today per order, for HCT > 45.0. PIV # 21 G was established in her left AC at  without incident, and phlebotomy was started. Snack was offered, but was politely declined. Phlebotomy was completed without incident at 1550, after 500 ml Blood was obtained, per order.      ml IV bolus was administered post phlebotomy, per order, and also without incident. After completion of the NS Bolus, her PIV was removed and gauze/coban was applied. Ms. Herman Beckofrd tolerated treatment very well today, and voiced no complaints. Ms. Herman Beckford was discharged from [place] [address] in stable condition at 1630. Willow Constantino She is to return in 1 week, on next Thursday, 4-29-21 at 1500, for her next appointment, for CBC/Phlebotomy (Weekly Status).      Robert Grullon RN  April 20, 2021  3:18 PM timeout was performed according to the [facility] standards identifying the appropriate patient, surgical site, and surgical procedure. Under fluoroscopy, a midline incision from L1-L5 was made.  Bovie cautery was used dissect through the subcutaneous tissue brian

## 2021-04-29 ENCOUNTER — HOSPITAL ENCOUNTER (OUTPATIENT)
Dept: INFUSION THERAPY | Age: 56
Discharge: HOME | End: 2021-04-29
Payer: COMMERCIAL

## 2021-04-29 VITALS
OXYGEN SATURATION: 96 % | SYSTOLIC BLOOD PRESSURE: 117 MMHG | TEMPERATURE: 97.7 F | DIASTOLIC BLOOD PRESSURE: 72 MMHG | RESPIRATION RATE: 16 BRPM | HEART RATE: 76 BPM

## 2021-04-29 LAB
BASO+EOS+MONOS # BLD AUTO: 0.7 K/UL (ref 0–2.3)
BASO+EOS+MONOS NFR BLD AUTO: 9 % (ref 0.1–17)
DIFFERENTIAL METHOD BLD: NORMAL
ERYTHROCYTE [DISTWIDTH] IN BLOOD BY AUTOMATED COUNT: 13.3 % (ref 11.5–14.5)
HCT VFR BLD AUTO: 41.6 % (ref 36–48)
HGB BLD-MCNC: 14.2 G/DL (ref 12–16)
LYMPHOCYTES # BLD: 2.9 K/UL (ref 1.1–5.9)
LYMPHOCYTES NFR BLD: 34 % (ref 14–44)
MCH RBC QN AUTO: 31.3 PG (ref 25–35)
MCHC RBC AUTO-ENTMCNC: 34.1 G/DL (ref 31–37)
MCV RBC AUTO: 91.6 FL (ref 78–102)
NEUTS SEG # BLD: 4.9 K/UL (ref 1.8–9.5)
NEUTS SEG NFR BLD: 58 % (ref 40–70)
PLATELET # BLD AUTO: 216 K/UL (ref 140–440)
RBC # BLD AUTO: 4.54 M/UL (ref 4.1–5.1)
WBC # BLD AUTO: 8.5 K/UL (ref 4.5–13)

## 2021-04-29 PROCEDURE — 36415 COLL VENOUS BLD VENIPUNCTURE: CPT

## 2021-04-29 PROCEDURE — 85025 COMPLETE CBC W/AUTO DIFF WBC: CPT

## 2021-04-29 NOTE — PROGRESS NOTES
[facility] Progress Note    Date: 2021    Name: Sanjuana Robertson    MRN: [id number]         : 1965      Ms. Antonio Gregory was assessed and education was provided. Ms. Eddy's vitals were reviewed and patient was observed for 5 minutes prior to treatment. Visit Vitals  /72 (BP 1 Location: Left upper arm, BP Patient Position: Sitting)   Pulse 76   Temp 97.7 °F (36.5 °C)   Resp 16   SpO2 96%   Breastfeeding No       Lab results were obtained and reviewed. Recent Results (from the past 12 hour(s))   CBC WITH 3 PART DIFF    Collection Time: 21  1:55 PM   Result Value Ref Range    WBC 8.5 4.5 - 13.0 K/uL    RBC 4.54 4.10 - 5.10 M/uL    HGB 14.2 12.0 - 16 g/dL    HCT 41.6 36 - 48 %    MCV 91.6 78 - 102 FL    MCH 31.3 25.0 - 35.0 PG    MCHC 34.1 31 - 37 g/dL    RDW 13.3 11.5 - 14.5 %    PLATELET 331 103 - 790 K/uL    NEUTROPHILS 58 40 - 70 %    MIXED CELLS 9 0.1 - 17 %    LYMPHOCYTES 34 14 - 44 %    ABS. NEUTROPHILS 4.9 1.8 - 9.5 K/UL    ABS. MIXED CELLS 0.7 0.0 - 2.3 K/uL    ABS. LYMPHOCYTES 2.9 1.1 - 5.9 K/UL    DF AUTOMATED         Therapeutic phlebotomy not done for HCT 41.6. Discharge instructions discussed with Ms Antonio Gregory and she verbalized understanding. Patient armband removed and shredded. Ms. Antonio Gregory was discharged from [place] [address] in stable condition at 1410. She is to return on 2021 at 1000 for her next appointment.     Lorrain Dakin, RN  2021  2:25 PM

## 2021-05-12 ENCOUNTER — OFFICE VISIT (OUTPATIENT)
Dept: ORTHOPEDIC SURGERY | Age: 56
End: 2021-05-12
Payer: COMMERCIAL

## 2021-05-12 VITALS
BODY MASS INDEX: 34.23 KG/M2 | OXYGEN SATURATION: 99 % | HEIGHT: 66 IN | HEART RATE: 65 BPM | WEIGHT: 213 LBS | TEMPERATURE: 97.9 F

## 2021-05-12 DIAGNOSIS — M17.0 PRIMARY OSTEOARTHRITIS OF KNEES, BILATERAL: Primary | ICD-10-CM

## 2021-05-12 PROCEDURE — 99213 OFFICE O/P EST LOW 20 MIN: CPT | Performed by: ORTHOPAEDIC SURGERY

## 2021-05-12 PROCEDURE — 20610 DRAIN/INJ JOINT/BURSA W/O US: CPT | Performed by: ORTHOPAEDIC SURGERY

## 2021-05-12 RX ORDER — TRIAMCINOLONE ACETONIDE 40 MG/ML
40 INJECTION, SUSPENSION INTRA-ARTICULAR; INTRAMUSCULAR ONCE
Status: COMPLETED | OUTPATIENT
Start: 2021-05-12 | End: 2021-05-12

## 2021-05-12 RX ADMIN — TRIAMCINOLONE ACETONIDE 40 MG: 40 INJECTION, SUSPENSION INTRA-ARTICULAR; INTRAMUSCULAR at 15:44

## 2021-05-12 NOTE — PROGRESS NOTES
Patient: Anais Leyva                MRN: [id number]       SSN: [id number]  YOB: 1965        AGE: 64 y.o. SEX: female  Body mass index is 34.38 kg/m². PCP: Lupillo Bruner MD  05/12/21    Chief Complaint: Bilateral knee pain 9/10    HPI: Anais Leyva is a 64 y.o. female patient who returns to the office today with bilateral knee pain. At her last visit she received a cortisone injection to both knees which she said gave her significant pain relief for up to about 2 months ago. The pain is returning. She also notes some crepitus with knee range of motion. Past Medical History:   Diagnosis Date    BRCA1 positive 2012    Breast cancer [facility]) 2013    right breast    [facility]     Diabetes [facility]     Hypercholesterolemia     Hypertension     Insomnia     Menopause 2012    chemo put her in menopause    Obesity (BMI 30.0-34. 9)     UTI (urinary tract infection)        Family History   Problem Relation Age of Onset    Cancer Mother     COPD Mother     Breast Cancer Mother     Alcohol abuse Father     Diabetes Father     Hypertension Father     Heart Disease Father     Elevated Lipids Father     Hypertension Brother     Hypertension Maternal Grandmother     Hypertension Maternal Grandfather        Current Outpatient Medications   Medication Sig Dispense Refill    furosemide (Lasix) 20 mg tablet Take 1 Tab by mouth daily as needed for Other (severe swelling). 90 Tab 1    gabapentin (NEURONTIN) 100 mg capsule TAKE 1 CAPSULE BY MOUTH THREE TIMES DAILY MAX  DAILY  AMOUNT  3  TABLETS 90 Cap 0    losartan (COZAAR) 25 mg tablet Take 1 Tab by mouth daily. 90 Tab 1    meloxicam (MOBIC) 15 mg tablet Take 1 tablet by mouth once daily 90 Tab 0    metFORMIN (GLUCOPHAGE) 500 mg tablet Take 2 Tabs by mouth two (2) times a day. 180 Tab 1    simvastatin (ZOCOR) 40 mg tablet Take 1 Tab by mouth nightly.  90 Tab 1    venlafaxine (EFFEXOR) 37.5 mg tablet Take 2 Tabs by mouth daily.  180 Tab 1    zolpidem (AMBIEN) 10 mg tablet TAKE 1 TABLET BY MOUTH NIGHTLY AS NEEDED FOR SLEEP 30 Tab 2       Allergies   Allergen Reactions    Aspirin Swelling    Codeine Swelling    Penicillins Swelling       Past Surgical History:   Procedure Laterality Date    HX CARPAL TUNNEL RELEASE Bilateral     HX  SECTION      HX CHOLECYSTECTOMY      HX HYSTERECTOMY  2009    partial    HX KNEE ARTHROSCOPY Bilateral     HX MASTECTOMY Right 2013    HX TUBAL LIGATION         Social History     Socioeconomic History    Marital status:      Spouse name: Not on file    Number of children: Not on file    Years of education: Not on file    Highest education level: Not on file   Occupational History    Not on file   Social Needs    Financial resource strain: Not on file    Food insecurity     Worry: Not on file     Inability: Not on file    Transportation needs     Medical: Not on file     Non-medical: Not on file   Tobacco Use    Smoking status: Current Every Day Smoker     Packs/day: 1.00    Smokeless tobacco: Never Used   Substance and Sexual Activity    Alcohol use: Not Currently     Frequency: Never     Comment: occassionl    Drug use: No    Sexual activity: Not Currently     Partners: Male   Lifestyle    Physical activity     Days per week: Not on file     Minutes per session: Not on file    Stress: Not on file   Relationships    Social connections     Talks on phone: Not on file     Gets together: Not on file     Attends Confucianism service: Not on file     Active member of club or organization: Not on file     Attends meetings of clubs or organizations: Not on file     Relationship status: Not on file    Intimate partner violence     Fear of current or ex partner: Not on file     Emotionally abused: Not on file     Physically abused: Not on file     Forced sexual activity: Not on file   Other Topics Concern    Not on file   Social History Narrative    Not on file       REVIEW OF SYSTEMS:      No changes from previous review of systems unless noted. PHYSICAL EXAMINATION:  Visit Vitals  Pulse 65   Temp 97.9 °F (36.6 °C) (Skin)   Ht 5' 6\" (1.676 m)   Wt 213 lb (96.6 kg)   SpO2 99%   BMI 34.38 kg/m²     Body mass index is 34.38 kg/m². GENERAL: Alert and oriented x3, in no acute distress. HEENT: Normocephalic, atraumatic. RESP: Non labored breathing. SKIN: No rashes or lesions noted. Knee Examination      R   L  Effusion   -   -  Warmth   -   -  Erythema   -   -  ROM   Extension  Full   Full   Flexion   Full   Full  Tenderness   Medial Joint  +   +   Lateral Joint  +   +   Posterior knee  -   -  Strength   Quad   5   5   Hamstring  5   5  Crepitus   Tibiofemoral   +   +   Patellofemoral  +   +  Instability   Anterior  -   -   Posterior  -   -   Patellofemoral  -   -  Lachman's   -   -  Anterior Drawer  -   -  Posterior Drawer  -   -  Roc's   Pain   -   -   Locking  -   -  Calf TTP   -   -  Straight Leg Raise  -   -      IMAGING:  No new imaging today    ASSESSMENT & PLAN  Diagnosis: Bilateral knee osteoarthritis    Jason Butler has bilateral knee osteoarthritis that symptomatic. We discussed several treatment options today and she opted for a repeat corticosteroid injection to both knees which she tolerated well. Follow-up as needed.     [facility]  OFFICE PROCEDURE PROGRESS NOTE        Chart reviewed for the following:   I, Che Brock MD, have reviewed the History, Physical and updated the Allergic reactions for [facility] performed immediately prior to start of procedure:   Damien Leahy MD, have performed the following reviews on [facility] prior to the start of the procedure:            * Patient was identified by name and date of birth   * Agreement on procedure being performed was verified  * Risks and Benefits explained to the patient  * Procedure site verified and marked as necessary  * Patient was positioned for comfort  * Consent was signed and verified    Time: 3:41 PM    Location: Bilateral knee intra-articular corticosteroid injections    Kenalog 40mg & 3cc Lidocaine    Date of procedure: 5/12/2021    Procedure performed by:  Horace Nunes MD    Provider assisted by: Angel Luis Barber LPN    Patient assisted by: self    How tolerated by patient: tolerated the procedure well with no complications    Post Procedural Pain Scale: 0 - No Hurt    Comments: none        Electronically signed by: Horace Nunes MD

## 2021-05-17 DIAGNOSIS — Z85.3 HISTORY OF RIGHT BREAST CANCER: ICD-10-CM

## 2021-05-19 RX ORDER — GABAPENTIN 100 MG/1
CAPSULE ORAL
Qty: 90 CAPSULE | Refills: 0 | Status: SHIPPED | OUTPATIENT
Start: 2021-05-19 | End: 2021-06-10

## 2021-05-19 NOTE — TELEPHONE ENCOUNTER
Requested Prescriptions     Pending Prescriptions Disp Refills    gabapentin (NEURONTIN) 100 mg capsule [Pharmacy Med Name: Gabapentin 100 MG Oral Capsule] 90 Capsule 0     Sig: TAKE 1 CAPSULE BY MOUTH THREE TIMES DAILY . DO NOT EXCEED 3 PER 24 HOURS     Jennifer Andrade called for their medication refill.     Last Office visit:  01-  Last labs:  08-  Follow up visit:  No follow up scheduled  Last date prescribe 03-    Please Advise

## 2021-05-19 NOTE — TELEPHONE ENCOUNTER
Requested Prescriptions     Pending Prescriptions Disp Refills    gabapentin (NEURONTIN) 100 mg capsule [Pharmacy Med Name: Gabapentin 100 MG Oral Capsule] 90 Capsule 0     Sig: TAKE 1 CAPSULE BY MOUTH THREE TIMES DAILY .  DO NOT EXCEED 3 PER 24 HOURS

## 2021-05-27 ENCOUNTER — HOSPITAL ENCOUNTER (OUTPATIENT)
Dept: INFUSION THERAPY | Age: 56
Discharge: HOME | End: 2021-05-27
Payer: COMMERCIAL

## 2021-05-27 VITALS
RESPIRATION RATE: 16 BRPM | TEMPERATURE: 97.7 F | SYSTOLIC BLOOD PRESSURE: 114 MMHG | HEART RATE: 68 BPM | OXYGEN SATURATION: 96 % | DIASTOLIC BLOOD PRESSURE: 66 MMHG

## 2021-05-27 LAB
BASO+EOS+MONOS # BLD AUTO: 0.7 K/UL (ref 0–2.3)
BASO+EOS+MONOS NFR BLD AUTO: 6 % (ref 0.1–17)
DIFFERENTIAL METHOD BLD: NORMAL
ERYTHROCYTE [DISTWIDTH] IN BLOOD BY AUTOMATED COUNT: 13.5 % (ref 11.5–14.5)
HCT VFR BLD AUTO: 48 % (ref 36–48)
HGB BLD-MCNC: 15.3 G/DL (ref 12–16)
LYMPHOCYTES # BLD: 3.1 K/UL (ref 1.1–5.9)
LYMPHOCYTES NFR BLD: 28 % (ref 14–44)
MCH RBC QN AUTO: 30.7 PG (ref 25–35)
MCHC RBC AUTO-ENTMCNC: 31.9 G/DL (ref 31–37)
MCV RBC AUTO: 96.2 FL (ref 78–102)
NEUTS SEG # BLD: 7.2 K/UL (ref 1.8–9.5)
NEUTS SEG NFR BLD: 65 % (ref 40–70)
PLATELET # BLD AUTO: 226 K/UL (ref 140–440)
RBC # BLD AUTO: 4.99 M/UL (ref 4.1–5.1)
WBC # BLD AUTO: 11 K/UL (ref 4.5–13)

## 2021-05-27 PROCEDURE — 85025 COMPLETE CBC W/AUTO DIFF WBC: CPT

## 2021-05-27 PROCEDURE — 99195 PHLEBOTOMY: CPT

## 2021-05-27 PROCEDURE — 74011250636 HC RX REV CODE- 250/636: Performed by: INTERNAL MEDICINE

## 2021-05-27 PROCEDURE — 36415 COLL VENOUS BLD VENIPUNCTURE: CPT

## 2021-05-27 RX ORDER — SODIUM CHLORIDE 9 MG/ML
500 INJECTION, SOLUTION INTRAVENOUS CONTINUOUS
Status: DISCONTINUED | OUTPATIENT
Start: 2021-05-27 | End: 2021-05-28 | Stop reason: HOSPADM

## 2021-05-27 RX ADMIN — SODIUM CHLORIDE 500 ML: 9 INJECTION, SOLUTION INTRAVENOUS at 15:10

## 2021-05-27 NOTE — PROGRESS NOTES
[facility] Progress Note    Date: May 27, 2021    Name: Joan Quezada    MRN: [id number]         : 1965    Ms. Herrera Hansen arrived in the [facility]  Today at 1430, in stable condition, here for CBC/Phlebotomy (Monthly Status). She was assessed and education was provided. Verbal patient education on Therapeutic Phlebotomy, and potential side effects & adverse reactions was provided, and Ms. Eddy verbalized understanding. Ms. Eddy's vitals were reviewed. Visit Vitals  BP (!) 144/79 (BP 1 Location: Left upper arm, BP Patient Position: Sitting)   Pulse 66   Resp 16   SpO2 95%   Breastfeeding No         Blood for the ordered CBC was drawn from her left AC at 1435, without incident, and was processed on site. Lab results were obtained and reviewed, and the CBC results from today, were as follows:      Recent Results (from the past 12 hour(s))   CBC WITH 3 PART DIFF    Collection Time: 21  2:30 PM   Result Value Ref Range    WBC 11.0 4.5 - 13.0 K/uL    RBC 4.99 4. 10 - 5.10 M/uL    HGB 15.3 12.0 - 16 g/dL    HCT 48.0 36 - 48 %    MCV 96.2 78 - 102 FL    MCH 30.7 25.0 - 35.0 PG    MCHC 31.9 31 - 37 g/dL    RDW 13.5 11.5 - 14.5 %    PLATELET 801 093 - 075 K/uL    NEUTROPHILS 65 40 - 70 %    MIXED CELLS 6 0.1 - 17 %    LYMPHOCYTES 28 14 - 44 %    ABS. NEUTROPHILS 7.2 1.8 - 9.5 K/UL    ABS. MIXED CELLS 0.7 0.0 - 2.3 K/uL    ABS. LYMPHOCYTES 3.1 1.1 - 5.9 K/UL    DF AUTOMATED               Phlebotomy was indicated today per order, for HCT > 45.0. PIV # 21 G was established in her left AC at [phone] without incident, and phlebotomy was started. Snack was offered, but was politely declined. Phlebotomy was completed without incident at 1510, after 300 ml Blood was obtained, per order.  ml IV bolus was administered post phlebotomy, per pt request, and also without incident. After completion of the NS Bolus, her PIV was removed and gauze/coban was applied.              Ms. Herrera Hansen tolerated treatment very well today, and voiced no complaints. Ms. Fernando Booker was discharged from [place] [address] in stable condition at [phone][facility] She is to return in 1 week, for her next appointment, for CBC/Phlebotomy (Weekly Status).      Daphnie Kaur RN  May 27, 2021  3:18 PM

## 2021-06-01 ENCOUNTER — APPOINTMENT (OUTPATIENT)
Dept: INFUSION THERAPY | Age: 56
End: 2021-06-01
Payer: COMMERCIAL

## 2021-06-03 ENCOUNTER — HOSPITAL ENCOUNTER (OUTPATIENT)
Dept: INFUSION THERAPY | Age: 56
Discharge: HOME | End: 2021-06-03
Payer: COMMERCIAL

## 2021-06-03 VITALS
SYSTOLIC BLOOD PRESSURE: 130 MMHG | HEART RATE: 65 BPM | RESPIRATION RATE: 16 BRPM | TEMPERATURE: 97 F | OXYGEN SATURATION: 96 % | DIASTOLIC BLOOD PRESSURE: 77 MMHG

## 2021-06-03 LAB
BASOPHILS # BLD: 0.1 K/UL (ref 0–0.1)
BASOPHILS NFR BLD: 1 % (ref 0–2)
DIFFERENTIAL METHOD BLD: NORMAL
EOSINOPHIL # BLD: 0.2 K/UL (ref 0–0.4)
EOSINOPHIL NFR BLD: 2 % (ref 0–5)
ERYTHROCYTE [DISTWIDTH] IN BLOOD BY AUTOMATED COUNT: 13.5 % (ref 11.6–14.5)
HCT VFR BLD AUTO: 42.1 % (ref 35–45)
HGB BLD-MCNC: 14.2 G/DL (ref 12–16)
LYMPHOCYTES # BLD: 3 K/UL (ref 0.9–3.6)
LYMPHOCYTES NFR BLD: 31 % (ref 21–52)
MCH RBC QN AUTO: 30.8 PG (ref 24–34)
MCHC RBC AUTO-ENTMCNC: 33.7 G/DL (ref 31–37)
MCV RBC AUTO: 91.3 FL (ref 74–97)
MONOCYTES # BLD: 0.5 K/UL (ref 0.05–1.2)
MONOCYTES NFR BLD: 6 % (ref 3–10)
NEUTS SEG # BLD: 5.8 K/UL (ref 1.8–8)
NEUTS SEG NFR BLD: 60 % (ref 40–73)
PLATELET # BLD AUTO: 226 K/UL (ref 135–420)
PMV BLD AUTO: 10.5 FL (ref 9.2–11.8)
RBC # BLD AUTO: 4.61 M/UL (ref 4.2–5.3)
WBC # BLD AUTO: 9.7 K/UL (ref 4.6–13.2)

## 2021-06-03 PROCEDURE — 99195 PHLEBOTOMY: CPT

## 2021-06-03 PROCEDURE — 74011250636 HC RX REV CODE- 250/636: Performed by: INTERNAL MEDICINE

## 2021-06-03 PROCEDURE — 85025 COMPLETE CBC W/AUTO DIFF WBC: CPT

## 2021-06-03 PROCEDURE — 36415 COLL VENOUS BLD VENIPUNCTURE: CPT

## 2021-06-03 RX ORDER — SODIUM CHLORIDE 9 MG/ML
500 INJECTION, SOLUTION INTRAVENOUS ONCE
Status: COMPLETED | OUTPATIENT
Start: 2021-06-03 | End: 2021-06-03

## 2021-06-03 RX ADMIN — SODIUM CHLORIDE 500 ML: 9 INJECTION, SOLUTION INTRAVENOUS at 15:30

## 2021-06-03 NOTE — PROGRESS NOTES
[facility] Progress Note    Date: Serenity 3, 2021    Name: Marleni Scott    MRN: [id number]         : 1965    Ms. Jesus Alberto Castellanos arrived in the [facility]  Today at [id number] [phone] in stable condition, here for CBC/Phlebotomy (Weekly Status). She was assessed and education was provided. Verbal patient education on Therapeutic Phlebotomy, and potential side effects & adverse reactions was provided, and Ms. Eddy verbalized understanding. Ms. Eddy's vitals were reviewed. Visit Vitals  BP (!) 151/82 (BP 1 Location: Left upper arm, BP Patient Position: Sitting)   Pulse 78   Temp 97.1 °F (36.2 °C)   Resp 16   SpO2 96%   Breastfeeding No         Blood for the ordered CBC was drawn from her left AC at 1430, without incident, and was processed on site. Lab results were obtained and reviewed, and the preliminary CBC results from today, were as follows:      1st Run:     WBC 9.4  No preliminary ANC  HGB 14.3  HCT 43.8        2nd Run:    WBC 9.2  ANC 6.1  HGB 14.2  HCT 43.8              Phlebotomy was indicated today per order, for HCT > 42.0. (weekly status parameters)      PIV # 21 G was established in her left AC at 1450 without incident, and phlebotomy was started. Snack was offered, but was politely declined. Phlebotomy flowed very slowly, even though the PIV had a brisk blood return and flushed easily with NS. So, the Left AC PIV was removed and gauze/coban applied. (approximately 100 ml blood was obtained)    PIV # 20 G was immediately restarted at 1515 in her posterior left lower forearm, and phlebotomy resumed without incident. Phlebotomy was completed without incident at 1530, after a total of 500 ml Blood was obtained, per order (400 ml Blood from the current PIV site).  ml IV bolus was administered post phlebotomy, per order, and also without incident. After completion of the NS Bolus, her PIV was removed and gauze/coban was applied.              Ms. Jesus Alberto Castellanos tolerated treatment very well today, and voiced no complaints. Ms. Merary Sims was discharged from [place] [address] in stable condition at 1610. Sobeida Faustin She is to return in 1 week, on next Thursday, 6-10-21 at 1500, for her next appointment, for CBC/Phlebotomy (Weekly Status).      Ted Gavin RN  Serenity 3, 2021  3:18 PM

## 2021-06-09 DIAGNOSIS — Z85.3 HISTORY OF RIGHT BREAST CANCER: ICD-10-CM

## 2021-06-10 ENCOUNTER — HOSPITAL ENCOUNTER (OUTPATIENT)
Dept: INFUSION THERAPY | Age: 56
End: 2021-06-10

## 2021-06-10 RX ORDER — GABAPENTIN 100 MG/1
CAPSULE ORAL
Qty: 90 CAPSULE | Refills: 0 | Status: SHIPPED | OUTPATIENT
Start: 2021-06-10 | End: 2021-07-20 | Stop reason: SDUPTHER

## 2021-06-10 NOTE — TELEPHONE ENCOUNTER
Patient needs to come in for controlled substance agreement prior to any other refills.   Arther Ormond, MD

## 2021-06-24 ENCOUNTER — HOSPITAL ENCOUNTER (OUTPATIENT)
Dept: INFUSION THERAPY | Age: 56
Discharge: HOME | End: 2021-06-24
Payer: COMMERCIAL

## 2021-06-24 VITALS
HEART RATE: 70 BPM | RESPIRATION RATE: 16 BRPM | TEMPERATURE: 98.3 F | OXYGEN SATURATION: 96 % | DIASTOLIC BLOOD PRESSURE: 77 MMHG | SYSTOLIC BLOOD PRESSURE: 160 MMHG

## 2021-06-24 LAB
BASO+EOS+MONOS # BLD AUTO: 0.5 K/UL (ref 0–2.3)
BASO+EOS+MONOS NFR BLD AUTO: 7 % (ref 0.1–17)
DIFFERENTIAL METHOD BLD: NORMAL
ERYTHROCYTE [DISTWIDTH] IN BLOOD BY AUTOMATED COUNT: 13.7 % (ref 11.5–14.5)
HCT VFR BLD AUTO: 40.8 % (ref 36–48)
HGB BLD-MCNC: 13.2 G/DL (ref 12–16)
LYMPHOCYTES # BLD: 2.6 K/UL (ref 1.1–5.9)
LYMPHOCYTES NFR BLD: 35 % (ref 14–44)
MCH RBC QN AUTO: 30.8 PG (ref 25–35)
MCHC RBC AUTO-ENTMCNC: 32.4 G/DL (ref 31–37)
MCV RBC AUTO: 95.3 FL (ref 78–102)
NEUTS SEG # BLD: 4.4 K/UL (ref 1.8–9.5)
NEUTS SEG NFR BLD: 58 % (ref 40–70)
PLATELET # BLD AUTO: 206 K/UL (ref 140–440)
RBC # BLD AUTO: 4.28 M/UL (ref 4.1–5.1)
WBC # BLD AUTO: 7.5 K/UL (ref 4.5–13)

## 2021-06-24 PROCEDURE — 36415 COLL VENOUS BLD VENIPUNCTURE: CPT

## 2021-06-24 PROCEDURE — 85025 COMPLETE CBC W/AUTO DIFF WBC: CPT

## 2021-06-24 NOTE — PROGRESS NOTES
[facility] Progress Note    Date: 2021    Name: Micaela Holloway    MRN: [id number]         : 1965    Ms. Pedro Luis Burden arrived in the [facility] today at 1400, in stable condition, here for CBC/Phlebotomy (Weekly Status). She was assessed and education was provided. Verbal patient education on Therapeutic Phlebotomy, and potential side effects & adverse reactions was provided, and Ms. Eddy verbalized understanding. Ms. Eddy's vitals were reviewed. Visit Vitals  BP (!) 160/77 (BP 1 Location: Left upper arm, BP Patient Position: Sitting)   Pulse 70   Temp 98.3 °F (36.8 °C)   Resp 16   SpO2 96%         Blood for the ordered CBC was drawn from her left AC at 1412, without incident, and was processed on site. Lab results were obtained and reviewed, and the CBC results from today, were as follows:    Recent Results (from the past 12 hour(s))   CBC WITH 3 PART DIFF    Collection Time: 21  2:12 PM   Result Value Ref Range    WBC 7.5 4.5 - 13.0 K/uL    RBC 4.28 4.10 - 5.10 M/uL    HGB 13.2 12.0 - 16 g/dL    HCT 40.8 36 - 48 %    MCV 95.3 78 - 102 FL    MCH 30.8 25.0 - 35.0 PG    MCHC 32.4 31 - 37 g/dL    RDW 13.7 11.5 - 14.5 %    PLATELET 733 816 - 309 K/uL    NEUTROPHILS 58 40 - 70 %    MIXED CELLS 7 0.1 - 17 %    LYMPHOCYTES 35 14 - 44 %    ABS. NEUTROPHILS 4.4 1.8 - 9.5 K/UL    ABS. MIXED CELLS 0.5 0.0 - 2.3 K/uL    ABS. LYMPHOCYTES 2.6 1.1 - 5.9 K/UL    DF AUTOMATED               Phlebotomy was HELD today per order, for HCT now < 42.0 (Weekly Status Parameters). Ms. Eddy tolerated well, and voiced no complaints. Ms. Pedro Luis Burden was discharged from [place] [address] in stable condition at 1425. Mustapha Ross She is to return in 1 month, on Thursday, 21 at 1400, for her next appointment, for CBC/Phlebotomy (Monthly Status).      Alma Steele RN  2021  2:18 PM

## 2021-07-06 ENCOUNTER — OFFICE VISIT (OUTPATIENT)
Dept: ORTHOPEDIC SURGERY | Age: 56
End: 2021-07-06

## 2021-07-06 VITALS — WEIGHT: 210 LBS | HEIGHT: 66 IN | BODY MASS INDEX: 33.75 KG/M2

## 2021-07-06 DIAGNOSIS — M25.561 RIGHT KNEE PAIN, UNSPECIFIED CHRONICITY: ICD-10-CM

## 2021-07-06 DIAGNOSIS — M25.561 RIGHT KNEE PAIN, UNSPECIFIED CHRONICITY: Primary | ICD-10-CM

## 2021-07-06 DIAGNOSIS — M17.11 PRIMARY OSTEOARTHRITIS OF RIGHT KNEE: ICD-10-CM

## 2021-07-06 PROBLEM — Z80.3 FAMILY HISTORY OF BREAST CANCER: Status: ACTIVE | Noted: 2021-07-06

## 2021-07-06 PROCEDURE — 99203 OFFICE O/P NEW LOW 30 MIN: CPT | Performed by: ORTHOPAEDIC SURGERY

## 2021-07-06 NOTE — PATIENT INSTRUCTIONS
Knee Arthritis: Care Instructions  Your Care Instructions     Knee arthritis is a breakdown of the cartilage that cushions your knee joint. When the cartilage wears down, your bones rub against each other. This causes pain and stiffness. Knee arthritis tends to get worse with time. Treatment for knee arthritis involves reducing pain, making the leg muscles stronger, and staying at a healthy body weight. The treatment usually does not improve the health of the cartilage, but it can reduce pain and improve how well your knee works. You can take simple measures to protect your knee joints, ease your pain, and help you stay active. Follow-up care is a key part of your treatment and safety. Be sure to make and go to all appointments, and call your doctor if you are having problems. It's also a good idea to know your test results and keep a list of the medicines you take. How can you care for yourself at home? · Know that knee arthritis will cause more pain on some days than on others. · Stay at a healthy weight. Lose weight if you are overweight. When you stand up, the pressure on your knees from every pound of body weight is multiplied four times. So if you lose 10 pounds, you will reduce the pressure on your knees by 40 pounds. · Talk to your doctor or physical therapist about exercises that will help ease joint pain. ? Stretch to help prevent stiffness and to prevent injury before you exercise. You may enjoy gentle forms of yoga to help keep your knee joints and muscles flexible. ? Walk instead of jog.  ? Ride a bike. This makes your thigh muscles stronger and takes pressure off your knee. ? Wear well-fitting and comfortable shoes. ? Exercise in chest-deep water. This can help you exercise longer with less pain. ? Avoid exercises that include squatting or kneeling. They can put a lot of strain on your knees.   ? Talk to your doctor to make sure that the exercise you do is not making the arthritis worse.  · Do not sit for long periods of time. Try to walk once in a while to keep your knee from getting stiff. · Ask your doctor or physical therapist whether shoe inserts may reduce your arthritis pain. · If you can afford it, get new athletic shoes at least every year. This can help reduce the strain on your knees. · Use a device to help you do everyday activities. ? A cane or walking stick can help you keep your balance when you walk. Hold the cane or walking stick in the hand opposite the painful knee. ? If you feel like you may fall when you walk, try using crutches or a front-wheeled walker. These can prevent falls that could cause more damage to your knee. ? A knee brace may help keep your knee stable and prevent pain. ? You also can use other things to make life easier, such as a higher toilet seat and handrails in the bathtub or shower. · Take pain medicines exactly as directed. ? Do not wait until you are in severe pain. You will get better results if you take it sooner. ? If you are not taking a prescription pain medicine, take an over-the-counter medicine such as acetaminophen (Tylenol), ibuprofen (Advil, Motrin), or naproxen (Aleve). Read and follow all instructions on the label. ? Do not take two or more pain medicines at the same time unless the doctor told you to. Many pain medicines have acetaminophen, which is Tylenol. Too much acetaminophen (Tylenol) can be harmful. ? Tell your doctor if you take a blood thinner, have diabetes, or have allergies to shellfish. · Ask your doctor if you might benefit from a shot of steroid medicine into your knee. This may provide pain relief for several months. · Many people take the supplements glucosamine and chondroitin for osteoarthritis. Some people feel they help, but the medical research does not show that they work. Talk to your doctor before you take these supplements. When should you call for help?    Call your doctor now or seek immediate medical care if:    · You have sudden swelling, warmth, or pain in your knee.     · You have knee pain and a fever or rash.     · You have such bad pain that you cannot use your knee. Watch closely for changes in your health, and be sure to contact your doctor if you have any problems. Where can you learn more? Go to http://www.gray.com/  Enter W187 in the search box to learn more about \"Knee Arthritis: Care Instructions. \"  Current as of: August 5, 2020               Content Version: 12.8  © 2006-2021 [employer]. Care instructions adapted under license by [employer] (which disclaims liability or warranty for this information). If you have questions about a medical condition or this instruction, always ask your healthcare professional. [address] any warranty or liability for your use of this information.

## 2021-07-06 NOTE — PROGRESS NOTES
Name: Rosemary Gasca    : 1965     Service Dept: [id number] [facility]    Patient's Pharmacies:    [address]  [address], [address]  [place] [address]  Phone: [phone] Fax: [address], [address]  [address] [address]  Phone: [phone] Fax: [phone]       Chief Complaint   Patient presents with    Knee Pain        Visit Vitals  Ht 5' 6\" (1.676 m)   Wt 210 lb (95.3 kg)   BMI 33.89 kg/m²      Allergies   Allergen Reactions    Aspirin Swelling    Codeine Swelling    Penicillins Swelling and Hives      Current Outpatient Medications   Medication Sig Dispense Refill    gabapentin (NEURONTIN) 100 mg capsule TAKE 1 CAPSULE BY MOUTH THREE TIMES DAILY MAX  DAILY  AMOUNT  3  TABLETS 90 Capsule 0    furosemide (Lasix) 20 mg tablet Take 1 Tab by mouth daily as needed for Other (severe swelling). 90 Tab 1    losartan (COZAAR) 25 mg tablet Take 1 Tab by mouth daily. 90 Tab 1    meloxicam (MOBIC) 15 mg tablet Take 1 tablet by mouth once daily 90 Tab 0    metFORMIN (GLUCOPHAGE) 500 mg tablet Take 2 Tabs by mouth two (2) times a day. 180 Tab 1    simvastatin (ZOCOR) 40 mg tablet Take 1 Tab by mouth nightly. 90 Tab 1    venlafaxine (EFFEXOR) 37.5 mg tablet Take 2 Tabs by mouth daily.  180 Tab 1    zolpidem (AMBIEN) 10 mg tablet TAKE 1 TABLET BY MOUTH NIGHTLY AS NEEDED FOR SLEEP 30 Tab 2      Patient Active Problem List   Diagnosis Code    Severe obesity ([address] E66.01    Diabetes ([address] E11.9    Hyperlipidemia E78.5    Hypertension I10    Insomnia G47.00    At risk for bone density loss Z91.89    Breast CA (HCC) C50.919    Family history of breast cancer Z80.3    GERD (gastroesophageal reflux disease) K21.9    Hot flashes related to aromatase inhibitor therapy R23.2, T45.1X5A    Increased glucose level R73.09    Lymphedema I89.0    Osteoarthritis of knee M17.10    S/P cholecystectomy Z90.49    Tobacco abuse Z72.0      Family History   Problem Relation Age of Onset    Cancer Mother     COPD Mother     Breast Cancer Mother     Alcohol abuse Father     Diabetes Father     Hypertension Father     Heart Disease Father     Elevated Lipids Father     Hypertension Brother     Hypertension Maternal Grandmother     Hypertension Maternal Grandfather       Social History     Socioeconomic History    Marital status:      Spouse name: Not on file    Number of children: Not on file    Years of education: Not on file    Highest education level: Not on file   Tobacco Use    Smoking status: Current Every Day Smoker     Packs/day: 1.00    Smokeless tobacco: Never Used   Substance and Sexual Activity    Alcohol use: Not Currently     Comment: occassionl    Drug use: No    Sexual activity: Not Currently     Partners: Male     Social Determinants of Health     Financial Resource Strain:     Difficulty of Paying Living Expenses:    Food Insecurity:     Worried About Running Out of Food in the Last Year:     Ran Out of Food in the Last Year:    Transportation Needs:     Lack of Transportation (Medical):      Lack of Transportation (Non-Medical):    Physical Activity:     Days of Exercise per Week:     Minutes of Exercise per Session:    Stress:     Feeling of Stress :    Social Connections:     Frequency of Communication with Friends and Family:     Frequency of Social Gatherings with Friends and Family:     Attends Oriental orthodox Services:     Active Member of Clubs or Organizations:     Attends Club or Organization Meetings:     Marital Status:       Past Surgical History:   Procedure Laterality Date    HX CARPAL TUNNEL RELEASE Bilateral     HX  SECTION      HX CHOLECYSTECTOMY      HX HYSTERECTOMY  2009    partial    HX KNEE ARTHROSCOPY Bilateral     HX MASTECTOMY Right 2013    HX TUBAL LIGATION        Past Medical History:   Diagnosis Date    BRCA1 positive 2012    Breast cancer [facility]) 2013    right breast    [facility] cancer [facility]     Diabetes ([facility]     Hypercholesterolemia     Hypertension     Insomnia     Menopause 2012    chemo put her in menopause    Obesity (BMI 30.0-34. 9)     UTI (urinary tract infection)         I have reviewed and agree with PFSH and ROS and intake form in chart and the record furthermore I have reviewed prior medical record(s) regarding this patients care during this appointment. Review of Systems:   Patient is a pleasant appearing individual, appropriately dressed, well hydrated, well nourished, who is alert, appropriately oriented for age, and in no acute distress with a normal gait and normal affect who does not appear to be in any significant pain. Physical Exam:  Left Knee -Decrease range of motion with flexion, Knee arc of greater than 50 degrees, Some crepitation, Grossly neurovascularly intact, Good cap refill, No skin lesion, Moderate swelling, No gross instability, Some quadriceps weakness Kellgren and Jose at least grade 3    Right Knee -Decrease range of motion with flexion, Some crepitation, Grossly neurovascularly intact, Good cap refill, No skin lesion, Moderate swelling, No gross instability, Some quadriceps weaknessKellgren and Jose at least grade 3     Encounter Diagnoses     ICD-10-CM ICD-9-CM   1. Right knee pain, unspecified chronicity  M25.561 719.46   2. Primary osteoarthritis of right knee  M17.11 715.16       HPI:  The patient is here with a chief complaint of bilateral knee pain, sharp, throbbing pain, progressively getting worse. Pain is 8/10. X-rays are positive for severe swelling and significant amount of bone-on-bone arthritis. Failed conservative treatment including injections. Assessment/Plan:  Plan will be for right total knee replacement with general medical clearance, outpatient surgery.       As part of continued conservative pain management options the patient was advised to utilize Tylenol or OTC NSAIDS as long as it is not medically contraindicated. Return to Office: Follow-up and Dispositions    · Return for [facility] for surgery. Scribed by Eliezer Dudley MD as dictated by Ariana Eason. Alexa Gomez MD.  Documentation True and Accepted Moises Gomez MD

## 2021-07-09 DIAGNOSIS — G47.00 INSOMNIA, UNSPECIFIED TYPE: ICD-10-CM

## 2021-07-12 RX ORDER — ZOLPIDEM TARTRATE 10 MG/1
TABLET ORAL
Qty: 30 TABLET | Refills: 0 | Status: SHIPPED | OUTPATIENT
Start: 2021-07-12 | End: 2021-07-20 | Stop reason: SDUPTHER

## 2021-07-20 ENCOUNTER — OFFICE VISIT (OUTPATIENT)
Dept: FAMILY MEDICINE CLINIC | Age: 56
End: 2021-07-20
Payer: COMMERCIAL

## 2021-07-20 VITALS
TEMPERATURE: 96.3 F | RESPIRATION RATE: 16 BRPM | BODY MASS INDEX: 34.39 KG/M2 | HEART RATE: 73 BPM | SYSTOLIC BLOOD PRESSURE: 143 MMHG | WEIGHT: 214 LBS | HEIGHT: 66 IN | OXYGEN SATURATION: 92 % | DIASTOLIC BLOOD PRESSURE: 83 MMHG

## 2021-07-20 DIAGNOSIS — G62.9 NEUROPATHY: ICD-10-CM

## 2021-07-20 DIAGNOSIS — M17.0 PRIMARY OSTEOARTHRITIS OF BOTH KNEES: ICD-10-CM

## 2021-07-20 DIAGNOSIS — G47.00 INSOMNIA, UNSPECIFIED TYPE: ICD-10-CM

## 2021-07-20 DIAGNOSIS — E11.9 COMPREHENSIVE DIABETIC FOOT EXAMINATION, TYPE 2 DM, ENCOUNTER FOR (HCC): ICD-10-CM

## 2021-07-20 DIAGNOSIS — Z12.4 SCREENING FOR MALIGNANT NEOPLASM OF CERVIX: ICD-10-CM

## 2021-07-20 DIAGNOSIS — E66.9 OBESITY (BMI 30-39.9): ICD-10-CM

## 2021-07-20 DIAGNOSIS — E11.9 TYPE 2 DIABETES MELLITUS WITHOUT COMPLICATION, WITHOUT LONG-TERM CURRENT USE OF INSULIN (HCC): Primary | ICD-10-CM

## 2021-07-20 DIAGNOSIS — Z85.3 HISTORY OF RIGHT BREAST CANCER: ICD-10-CM

## 2021-07-20 DIAGNOSIS — Z12.11 SCREEN FOR COLON CANCER: ICD-10-CM

## 2021-07-20 DIAGNOSIS — I10 ESSENTIAL HYPERTENSION: ICD-10-CM

## 2021-07-20 DIAGNOSIS — D75.1 POLYCYTHEMIA: ICD-10-CM

## 2021-07-20 LAB — HBA1C MFR BLD HPLC: 9.2 %

## 2021-07-20 PROCEDURE — 83036 HEMOGLOBIN GLYCOSYLATED A1C: CPT | Performed by: FAMILY MEDICINE

## 2021-07-20 PROCEDURE — 99214 OFFICE O/P EST MOD 30 MIN: CPT | Performed by: FAMILY MEDICINE

## 2021-07-20 RX ORDER — METFORMIN HYDROCHLORIDE 1000 MG/1
1000 TABLET ORAL 2 TIMES DAILY WITH MEALS
Qty: 180 TABLET | Refills: 1 | Status: SHIPPED | OUTPATIENT
Start: 2021-07-20 | End: 2021-09-30 | Stop reason: SDUPTHER

## 2021-07-20 RX ORDER — ZOLPIDEM TARTRATE 10 MG/1
TABLET ORAL
Qty: 30 TABLET | Refills: 0 | Status: SHIPPED | OUTPATIENT
Start: 2021-07-20 | End: 2021-08-12

## 2021-07-20 RX ORDER — MELOXICAM 15 MG/1
TABLET ORAL
Qty: 90 TABLET | Refills: 0 | Status: SHIPPED | OUTPATIENT
Start: 2021-07-20 | End: 2021-08-12

## 2021-07-20 RX ORDER — GABAPENTIN 100 MG/1
CAPSULE ORAL
Qty: 120 CAPSULE | Refills: 3 | Status: SHIPPED | OUTPATIENT
Start: 2021-07-20 | End: 2022-01-20

## 2021-07-20 NOTE — PROGRESS NOTES
[facility]  Yris Monte comes in for f/u care. DM2: Patient has diabetes mellitus type 2. She is on Metformin. Her blood glucose numbers have been elevated. We did an HbA1c checked today and it is at 9.2. I did a foot exam that is stable. She has been on 500 mg of Metformin twice a day. She is not doing much of lifestyle or dietary modification. I discussed this with her. I will have her increase her Metformin to 1000 mg 2 times a day. I will place a referral for her to be seen by the endocrinologist.  She will keep a blood glucose log and I will follow-up at next visit. HTN: Patient has hypertension. Blood pressure slightly elevated today. She states that it is usually normal but keeps fluctuating. Today it is 143/83. Discussed adjustment of medication. She only takes 25 mg of the losartan. States that last time we increased this to 50 it caused hypotension and she almost had a syncopal episode. I will have her continue with the current dosage of losartan and she will keep blood pressure log and I will follow-up at next visit. Breast cancer: Patient has a history of right breast cancer. She had mastectomy and chemotherapy. She was on Femara for years but has quit since then. Has been stable since. Polycythemia: Patient has polycythemia. She is seen by the hematologist.  She will call to set up a follow-up appointment with them. Obesity: Patient has a BMI of 34.54. She will intensify lifestyle and dietary modification. Neuropathy: Patient has neuropathy. She takes gabapentin. Neuropathy is in the lower extremities. She has been on 100 mg 3 times a day. Feels that this is not enough and would like to increase. We will have her take 2 pills in the morning and take 1 in the afternoon and 1 at bedtime. I will follow-up at next visit. Arthralgia: Patient has bilateral knee arthralgia. No swelling or redness. She takes meloxicam with good results.   She would like a refill of medication. This is sent in. Insomnia: Patient has insomnia. Has used Ambien with good result. Takes 10 mg daily. Would like a refill of medication. Prescription is sent in. Dyslipidemia: Patient has dyslipidemia. She is on Zocor. Stable on the medication. We will recheck lipid panel. Mood disorder: Patient has mood disorder with anxiety and depression. She is on Effexor. Stable on the medication. Continue current treatment plan. Pedal edema: Patient has pedal edema that comes on and off. We will recheck labs. Uses furosemide as needed for this. Past Medical History  Past Medical History:   Diagnosis Date    BRCA1 positive 2012    Breast cancer ([facility] 2013    right breast    Breast cancer ([facility]     Diabetes ([facility]     Hypercholesterolemia     Hypertension     Insomnia     Menopause     chemo put her in menopause    Obesity (BMI 30.0-34. 9)     UTI (urinary tract infection)        Surgical History  Past Surgical History:   Procedure Laterality Date    HX CARPAL TUNNEL RELEASE Bilateral     HX  SECTION      HX CHOLECYSTECTOMY      HX HYSTERECTOMY  2009    partial    HX KNEE ARTHROSCOPY Bilateral     HX MASTECTOMY Right 2013    HX TUBAL LIGATION          Medications  Current Outpatient Medications   Medication Sig Dispense Refill    zolpidem (AMBIEN) 10 mg tablet TAKE 1 TABLET BY MOUTH NIGHTLY AS NEEDED FOR SLEEP 30 Tablet 0    gabapentin (NEURONTIN) 100 mg capsule TAKE 1 CAPSULE BY MOUTH THREE TIMES DAILY MAX  DAILY  AMOUNT  3  TABLETS 90 Capsule 0    furosemide (Lasix) 20 mg tablet Take 1 Tab by mouth daily as needed for Other (severe swelling). 90 Tab 1    losartan (COZAAR) 25 mg tablet Take 1 Tab by mouth daily. 90 Tab 1    meloxicam (MOBIC) 15 mg tablet Take 1 tablet by mouth once daily 90 Tab 0    metFORMIN (GLUCOPHAGE) 500 mg tablet Take 2 Tabs by mouth two (2) times a day. 180 Tab 1    simvastatin (ZOCOR) 40 mg tablet Take 1 Tab by mouth nightly.  90 Tab 1  venlafaxine (EFFEXOR) 37.5 mg tablet Take 2 Tabs by mouth daily. 180 Tab 1       Allergies  Allergies   Allergen Reactions    Aspirin Swelling    Codeine Swelling    Penicillins Swelling and Hives       Family History  Family History   Problem Relation Age of Onset    Cancer Mother     COPD Mother     Breast Cancer Mother     Alcohol abuse Father     Diabetes Father     Hypertension Father     Heart Disease Father     Elevated Lipids Father     Hypertension Brother     Hypertension Maternal Grandmother     Hypertension Maternal Grandfather        Social History  Social History     Socioeconomic History    Marital status:      Spouse name: Not on file    Number of children: Not on file    Years of education: Not on file    Highest education level: Not on file   Occupational History    Not on file   Tobacco Use    Smoking status: Current Every Day Smoker     Packs/day: 1.00    Smokeless tobacco: Never Used   Vaping Use    Vaping Use: Never used   Substance and Sexual Activity    Alcohol use: Not Currently     Comment: occassionl    Drug use: No    Sexual activity: Not Currently     Partners: Male   Other Topics Concern    Not on file   Social History Narrative    Not on file     Social Determinants of Health     Financial Resource Strain:     Difficulty of Paying Living Expenses:    Food Insecurity:     Worried About Running Out of Food in the Last Year:     Ran Out of Food in the Last Year:    Transportation Needs:     Lack of Transportation (Medical):      Lack of Transportation (Non-Medical):    Physical Activity:     Days of Exercise per Week:     Minutes of Exercise per Session:    Stress:     Feeling of Stress :    Social Connections:     Frequency of Communication with Friends and Family:     Frequency of Social Gatherings with Friends and Family:     Attends Mormonism Services:     Active Member of Clubs or Organizations:     Attends Club or Organization Meetings:     Marital Status:    Intimate Partner Violence:     Fear of Current or Ex-Partner:     Emotionally Abused:     Physically Abused:     Sexually Abused:        Review of Systems  Review of Systems - Review of all systems is negative except as noted above in the HPI. Vital Signs  Visit Vitals  BP (!) 143/83   Pulse 73   Temp (!) 96.3 °F (35.7 °C) (Oral)   Resp 16   Ht 5' 6\" (1.676 m)   Wt 214 lb (97.1 kg)   SpO2 92%   BMI 34.54 kg/m²         Physical Exam  Physical Examination: General appearance - oriented to person, place, and time, overweight and acyanotic, in no respiratory distress  Mental status - alert, oriented to person, place, and time, affect appropriate to mood  Neck - supple, no significant adenopathy  Lymphatics - no palpable lymphadenopathy  Chest - no tachypnea, retractions or cyanosis  Heart - S1 and S2 normal  Abdomen - no rebound tenderness noted  Back exam - limited range of motion  Neurological - abnormal neurological exam unchanged from prior examinations  Musculoskeletal - osteoarthritic changes noted in both hands  Extremities - intact peripheral pulses      Results  Results for orders placed or performed during the hospital encounter of 06/24/21   CBC WITH 3 PART DIFF   Result Value Ref Range    WBC 7.5 4.5 - 13.0 K/uL    RBC 4.28 4.10 - 5.10 M/uL    HGB 13.2 12.0 - 16 g/dL    HCT 40.8 36 - 48 %    MCV 95.3 78 - 102 FL    MCH 30.8 25.0 - 35.0 PG    MCHC 32.4 31 - 37 g/dL    RDW 13.7 11.5 - 14.5 %    PLATELET 999 947 - 707 K/uL    NEUTROPHILS 58 40 - 70 %    MIXED CELLS 7 0.1 - 17 %    LYMPHOCYTES 35 14 - 44 %    ABS. NEUTROPHILS 4.4 1.8 - 9.5 K/UL    ABS. MIXED CELLS 0.5 0.0 - 2.3 K/uL    ABS. LYMPHOCYTES 2.6 1.1 - 5.9 K/UL    DF AUTOMATED         ASSESSMENT and PLAN    ICD-10-CM ICD-9-CM    1.  Type 2 diabetes mellitus without complication, without long-term current use of insulin (HCC)  E11.9 250.00 metFORMIN (GLUCOPHAGE) 1,000 mg tablet      REFERRAL TO ENDOCRINOLOGY  DIABETES FOOT EXAM      LIPID PANEL      METABOLIC PANEL, COMPREHENSIVE      CBC WITH AUTOMATED DIFF      AMB POC HEMOGLOBIN A1C   2. Insomnia, unspecified type  G47.00 780.52 zolpidem (AMBIEN) 10 mg tablet   3. Primary osteoarthritis of both knees  M17.0 715.16 meloxicam (MOBIC) 15 mg tablet   4. History of right breast cancer  Z85.3 V10.3 gabapentin (NEURONTIN) 100 mg capsule   5. Comprehensive diabetic foot examination, type 2 DM, encounter for ([facility]  E11.9 250.00  DIABETES FOOT EXAM   6. Screen for colon cancer  Z12.11 V76.51 REFERRAL TO COLON AND RECTAL SURGERY   7. Screening for malignant neoplasm of cervix  Z12.4 V76.2 REFERRAL TO GYNECOLOGY   8. Essential hypertension  I10 401.9    9. Polycythemia  D75.1 238.4    10. Obesity (BMI 30-39. 9)  E66.9 278.00    11. Neuropathy  G62.9 355.9      lab results and schedule of future lab studies reviewed with patient  reviewed diet, exercise and weight control  cardiovascular risk and specific lipid/LDL goals reviewed  reviewed medications and side effects in detail  specific diabetic recommendations: low cholesterol diet, weight control and daily exercise discussed, home glucose monitoring emphasized, all medications, side effects and compliance discussed carefully, foot care discussed and Podiatry visits discussed, annual eye examinations at Ophthalmology discussed, glycohemoglobin and other lab monitoring discussed and long term diabetic complications discussed  radiology results and schedule of future radiology studies reviewed with patient      I have discussed the diagnosis with the patient and the intended plan of care as seen in the above orders. The patient has received an after-visit summary and questions were answered concerning future plans. I have discussed medication, side effects, and warnings with the patient in detail. The patient verbalized understanding and is in agreement with the plan of care.  The patient will contact the office with any additional concerns. Teri Velasquez MD    PLEASE NOTE:   This document has been produced using voice recognition software.  Unrecognized errors in transcription may be present

## 2021-07-20 NOTE — PROGRESS NOTES
Chief Complaint   Patient presents with    Follow Up Chronic Condition     1. Have you been to the ER, urgent care clinic since your last visit? Hospitalized since your last visit? No    2. Have you seen or consulted any other health care providers outside of the [address] [place] [address] since your last visit? Include any pap smears or colon screening.  No

## 2021-07-22 ENCOUNTER — HOSPITAL ENCOUNTER (OUTPATIENT)
Dept: INFUSION THERAPY | Age: 56
Discharge: HOME | End: 2021-07-22
Payer: COMMERCIAL

## 2021-07-22 ENCOUNTER — LAB ONLY (OUTPATIENT)
Dept: ONCOLOGY | Age: 56
End: 2021-07-22

## 2021-07-22 ENCOUNTER — HOSPITAL ENCOUNTER (OUTPATIENT)
Dept: LAB | Age: 56
Discharge: HOME | End: 2021-07-22
Payer: COMMERCIAL

## 2021-07-22 VITALS
HEART RATE: 64 BPM | OXYGEN SATURATION: 95 % | DIASTOLIC BLOOD PRESSURE: 76 MMHG | TEMPERATURE: 97.8 F | SYSTOLIC BLOOD PRESSURE: 135 MMHG | RESPIRATION RATE: 18 BRPM

## 2021-07-22 DIAGNOSIS — D75.1 POLYCYTHEMIA: ICD-10-CM

## 2021-07-22 DIAGNOSIS — D75.1 SECONDARY POLYCYTHEMIA: Primary | ICD-10-CM

## 2021-07-22 LAB
ALBUMIN SERPL-MCNC: 4 G/DL (ref 3.4–5)
ALBUMIN/GLOB SERPL: 1.3 {RATIO} (ref 0.8–1.7)
ALP SERPL-CCNC: 85 U/L (ref 45–117)
ALT SERPL-CCNC: 59 U/L (ref 13–56)
ANION GAP SERPL CALC-SCNC: 7 MMOL/L (ref 3–18)
AST SERPL-CCNC: 42 U/L (ref 10–38)
BASO+EOS+MONOS # BLD AUTO: 0.5 K/UL (ref 0–2.3)
BASO+EOS+MONOS NFR BLD AUTO: 6 % (ref 0.1–17)
BASOPHILS # BLD: 0.1 K/UL (ref 0–0.1)
BASOPHILS NFR BLD: 1 % (ref 0–2)
BILIRUB SERPL-MCNC: 0.7 MG/DL (ref 0.2–1)
BUN SERPL-MCNC: 8 MG/DL (ref 7–18)
BUN/CREAT SERPL: 12 (ref 12–20)
CALCIUM SERPL-MCNC: 9.5 MG/DL (ref 8.5–10.1)
CHLORIDE SERPL-SCNC: 103 MMOL/L (ref 100–111)
CO2 SERPL-SCNC: 28 MMOL/L (ref 21–32)
CREAT SERPL-MCNC: 0.69 MG/DL (ref 0.6–1.3)
DIFFERENTIAL METHOD BLD: ABNORMAL
DIFFERENTIAL METHOD BLD: NORMAL
EOSINOPHIL # BLD: 0.2 K/UL (ref 0–0.4)
EOSINOPHIL NFR BLD: 2 % (ref 0–5)
ERYTHROCYTE [DISTWIDTH] IN BLOOD BY AUTOMATED COUNT: 13.2 % (ref 11.5–14.5)
ERYTHROCYTE [DISTWIDTH] IN BLOOD BY AUTOMATED COUNT: 13.4 % (ref 11.6–14.5)
FERRITIN SERPL-MCNC: 34 NG/ML (ref 8–388)
GLOBULIN SER CALC-MCNC: 3 G/DL (ref 2–4)
GLUCOSE SERPL-MCNC: 189 MG/DL (ref 74–99)
HCT VFR BLD AUTO: 46 % (ref 36–48)
HCT VFR BLD AUTO: 48.7 % (ref 35–45)
HGB BLD-MCNC: 15 G/DL (ref 12–16)
HGB BLD-MCNC: 15.1 G/DL (ref 12–16)
IRON SATN MFR SERPL: 18 % (ref 20–50)
IRON SERPL-MCNC: 83 UG/DL (ref 50–175)
LYMPHOCYTES # BLD: 2.5 K/UL (ref 0.9–3.6)
LYMPHOCYTES # BLD: 2.5 K/UL (ref 1.1–5.9)
LYMPHOCYTES NFR BLD: 33 % (ref 14–44)
LYMPHOCYTES NFR BLD: 33 % (ref 21–52)
MCH RBC QN AUTO: 29.2 PG (ref 24–34)
MCH RBC QN AUTO: 30.5 PG (ref 25–35)
MCHC RBC AUTO-ENTMCNC: 31 G/DL (ref 31–37)
MCHC RBC AUTO-ENTMCNC: 32.6 G/DL (ref 31–37)
MCV RBC AUTO: 93.5 FL (ref 78–102)
MCV RBC AUTO: 94 FL (ref 74–97)
MONOCYTES # BLD: 0.5 K/UL (ref 0.05–1.2)
MONOCYTES NFR BLD: 7 % (ref 3–10)
NEUTS SEG # BLD: 4.1 K/UL (ref 1.8–8)
NEUTS SEG # BLD: 4.6 K/UL (ref 1.8–9.5)
NEUTS SEG NFR BLD: 56 % (ref 40–73)
NEUTS SEG NFR BLD: 61 % (ref 40–70)
PLATELET # BLD AUTO: 190 K/UL (ref 140–440)
PLATELET # BLD AUTO: 240 K/UL (ref 135–420)
PMV BLD AUTO: 10.9 FL (ref 9.2–11.8)
POTASSIUM SERPL-SCNC: 3.8 MMOL/L (ref 3.5–5.5)
PROT SERPL-MCNC: 7 G/DL (ref 6.4–8.2)
RBC # BLD AUTO: 4.92 M/UL (ref 4.1–5.1)
RBC # BLD AUTO: 5.18 M/UL (ref 4.2–5.3)
SODIUM SERPL-SCNC: 138 MMOL/L (ref 136–145)
TIBC SERPL-MCNC: 470 UG/DL (ref 250–450)
WBC # BLD AUTO: 7.4 K/UL (ref 4.6–13.2)
WBC # BLD AUTO: 7.6 K/UL (ref 4.5–13)

## 2021-07-22 PROCEDURE — 82728 ASSAY OF FERRITIN: CPT

## 2021-07-22 PROCEDURE — 80053 COMPREHEN METABOLIC PANEL: CPT

## 2021-07-22 PROCEDURE — 74011250636 HC RX REV CODE- 250/636: Performed by: INTERNAL MEDICINE

## 2021-07-22 PROCEDURE — 99195 PHLEBOTOMY: CPT

## 2021-07-22 PROCEDURE — 85025 COMPLETE CBC W/AUTO DIFF WBC: CPT

## 2021-07-22 PROCEDURE — 83550 IRON BINDING TEST: CPT

## 2021-07-22 PROCEDURE — 36415 COLL VENOUS BLD VENIPUNCTURE: CPT

## 2021-07-22 RX ORDER — SODIUM CHLORIDE 9 MG/ML
500 INJECTION, SOLUTION INTRAVENOUS CONTINUOUS
Status: DISCONTINUED | OUTPATIENT
Start: 2021-07-22 | End: 2021-07-23 | Stop reason: HOSPADM

## 2021-07-22 RX ADMIN — SODIUM CHLORIDE 500 ML: 9 INJECTION, SOLUTION INTRAVENOUS at 14:41

## 2021-07-22 NOTE — PROGRESS NOTES
[facility] Progress Note    Date: 2021    Name: Fareed Liang    MRN: [id number]         : 1965    Ms. Adam Rubio arrived in the [facility]  Today at 1400 in stable condition, here for CBC/Phlebotomy (monthly Status). She was assessed and education was provided. Verbal patient education on Therapeutic Phlebotomy, and potential side effects & adverse reactions was provided, and Ms. Eddy verbalized understanding. Ms. Eddy's vitals were reviewed. Visit Vitals  /85 (BP 1 Location: Left upper arm, BP Patient Position: Sitting)   Pulse 72   Temp 97.8 °F (36.6 °C)   Resp 16   SpO2 95%         Blood for the ordered CBC was drawn from her left AC without incident, and was processed on site. Lab results were obtained and reviewed, and the preliminary CBC results from today, were as follows:    Recent Results (from the past 12 hour(s))   CBC WITH 3 PART DIFF    Collection Time: 21  2:09 PM   Result Value Ref Range    WBC 7.6 4.5 - 13.0 K/uL    RBC 4.92 4.10 - 5.10 M/uL    HGB 15.0 12.0 - 16 g/dL    HCT 46.0 36 - 48 %    MCV 93.5 78 - 102 FL    MCH 30.5 25.0 - 35.0 PG    MCHC 32.6 31 - 37 g/dL    RDW 13.2 11.5 - 14.5 %    PLATELET 150 986 - 090 K/uL    NEUTROPHILS 61 40 - 70 %    MIXED CELLS 6 0.1 - 17 %    LYMPHOCYTES 33 14 - 44 %    ABS. NEUTROPHILS 4.6 1.8 - 9.5 K/UL    ABS. MIXED CELLS 0.5 0.0 - 2.3 K/uL    ABS. LYMPHOCYTES 2.5 1.1 - 5.9 K/UL    DF AUTOMATED       Phlebotomy was indicated today per order, for HCT > 45.0. (monthly status parameters)      PIV # 25 G was established in her left AC without incident at 1421, and phlebotomy was started. Snack was offered, but was politely declined. At 1440 phlebotomy completed and 500ml whole blood removed then NS infusion started as ordered.  ml IV bolus was administered post phlebotomy, per order, and also without incident. After completion of the NS Bolus, her PIV was removed and gauze/coban was applied.      Ms. Surjit tolerated treatment very well today, and voiced no complaints. Ms. Nataly Harden was discharged from [place] [address] in stable condition at 1610. [employer] She is to return in 1 week, on next Thursday, 7-29-21 at 1300, for her next appointment, for CBC/Phlebotomy (Weekly Status).      Chris Romano RN  July 22, 2021

## 2021-08-03 ENCOUNTER — VIRTUAL VISIT (OUTPATIENT)
Dept: ONCOLOGY | Age: 56
End: 2021-08-03
Payer: COMMERCIAL

## 2021-08-03 DIAGNOSIS — D75.1 POLYCYTHEMIA: ICD-10-CM

## 2021-08-03 DIAGNOSIS — Z85.3 HISTORY OF BREAST CANCER: ICD-10-CM

## 2021-08-03 DIAGNOSIS — D75.1 SECONDARY POLYCYTHEMIA: Primary | ICD-10-CM

## 2021-08-03 PROCEDURE — 99213 OFFICE O/P EST LOW 20 MIN: CPT | Performed by: INTERNAL MEDICINE

## 2021-08-03 NOTE — PROGRESS NOTES
Britta Sharif is a 64 y.o. female, evaluated via audio-only technology on 8/3/2021 for Polycythemia. Assessment & Plan:   Polycythemia  -- The patient has a past medical history significant of uncontrolled diabetes mellitus, right breast cancer (Dx 2013) BRCA1 positive, obesity. -- 8/25/2020 CBC reported hemoglobin was of 16.2%, hematocrit was 48.1%. Total WBC was 7.6 and platelet was 539,525.  -- Lab reviews indicated chronic polycythemia since at least 9/3/2019, hematocrit was 47.6%. -- She is an active smoker, 1-1/2 PPD for years. -- Erythropoietin 11.5, WNL; elevated Carbon monoxide; NEGATIVE JAK2/MPL/CALR mutations, BCR/ABL1. Abd US reviewed. -- Her polycythemia was likely a secondary process, smoking related. She is still active smoking.   -- Clinical stable. Recent lab reviewed with the patient. Plan:  -- She is on monthly phlebotomy schedule currently while she is working on smoking cessation. -- Advised pt to take ASA 81 mg if no further contraindications. -- F/u PCP for smoking cessation    -- We will see the patient back in clinic in about 4 months. Always sooner if required. History of breast cancer  -- She has a past medical history significant of right breast cancer (Dx 2013, S.p chemotherapy at [facility]) BRCA1 positive, obesity. -- She has f/u with PCP for annual mammogram as scheduled. 12  Subjective: The patient today has no new complaints. She denied fever, chills, night sweat, unintentional weight loss, skin lumps or bumps, acute bleeding or bruising issues. No acute bleeding, blood in stool, dark stool, melena, hematochezia, hemoptysis, dark urine, or easily bruising. Denied headache, acute vision change, dizziness, chest pain, worsen shortness of breath, palpitation, productive cough, nausea, vomiting, abdominal pain, altered bowel habits, dysuria, worsen bone pain or back pain, new focal numbness or weakness.          Prior to Admission medications    Medication Sig Start Date End Date Taking? Authorizing Provider   zolpidem (AMBIEN) 10 mg tablet TAKE 1 TABLET BY MOUTH NIGHTLY AS NEEDED FOR SLEEP 7/20/21   Julian Vivas MD   meloxicam (MOBIC) 15 mg tablet Take 1 tablet by mouth once daily 7/20/21   Julian Vivas MD   gabapentin (NEURONTIN) 100 mg capsule TAKE 2 CAPSULEs BY MOUTH IN MORNING THEN 1 CAPSULE AFTERNOON AND 1 CAPSULE AT BEDTIME. MAX 4 CAPS /24 HRS 7/20/21   Julian Vivas MD   metFORMIN (GLUCOPHAGE) 1,000 mg tablet Take 1 Tablet by mouth two (2) times daily (with meals). 7/20/21   Julian Vivas MD   furosemide (Lasix) 20 mg tablet Take 1 Tab by mouth daily as needed for Other (severe swelling). 3/25/21   Julian Vivas MD   losartan (COZAAR) 25 mg tablet Take 1 Tab by mouth daily. 3/25/21   Julian Vivas MD   simvastatin (ZOCOR) 40 mg tablet Take 1 Tab by mouth nightly. 3/25/21   Julian Vivas MD   venlafaxine (EFFEXOR) 37.5 mg tablet Take 2 Tabs by mouth daily. 3/25/21   Julian Vivas MD         Review of Systems   Constitutional: Negative for chills, diaphoresis, fever, malaise/fatigue and weight loss. Respiratory: Negative for cough, hemoptysis, shortness of breath and wheezing. Cardiovascular: Negative for chest pain, palpitations and leg swelling. Gastrointestinal: Negative for abdominal pain, diarrhea, heartburn, nausea and vomiting. Genitourinary: Negative for dysuria, frequency, hematuria and urgency. Musculoskeletal: Negative for joint pain and myalgias. Skin: Negative for itching and rash. Neurological: Negative for dizziness, seizures, weakness and headaches. Psychiatric/Behavioral: Negative for depression. The patient does not have insomnia. No flowsheet data found.      Fred Sidhu, who was evaluated through a patient-initiated, synchronous (real-time) audio only encounter, and/or her healthcare decision maker, is aware that it is a billable service, with coverage as determined by her insurance carrier. She provided verbal consent to proceed: Yes. She has not had a related appointment within my department in the past 7 days or scheduled within the next 24 hours.       Total Time: minutes: 11-20 minutes    Marylen Masse, MD

## 2021-08-08 DIAGNOSIS — G47.00 INSOMNIA, UNSPECIFIED TYPE: ICD-10-CM

## 2021-08-08 DIAGNOSIS — M17.0 PRIMARY OSTEOARTHRITIS OF BOTH KNEES: ICD-10-CM

## 2021-08-12 RX ORDER — ZOLPIDEM TARTRATE 10 MG/1
TABLET ORAL
Qty: 30 TABLET | Refills: 0 | Status: SHIPPED | OUTPATIENT
Start: 2021-08-12 | End: 2021-09-30 | Stop reason: SDUPTHER

## 2021-08-12 RX ORDER — MELOXICAM 15 MG/1
TABLET ORAL
Qty: 30 TABLET | Refills: 0 | Status: SHIPPED | OUTPATIENT
Start: 2021-08-12 | End: 2021-09-30 | Stop reason: SDUPTHER

## 2021-09-13 DIAGNOSIS — D64.9 ANEMIA, UNSPECIFIED TYPE: ICD-10-CM

## 2021-09-13 DIAGNOSIS — D75.1 POLYCYTHEMIA: Primary | ICD-10-CM

## 2021-09-23 ENCOUNTER — HOSPITAL ENCOUNTER (OUTPATIENT)
Dept: LAB | Age: 56
Discharge: HOME | End: 2021-09-23
Payer: COMMERCIAL

## 2021-09-23 ENCOUNTER — HOSPITAL ENCOUNTER (OUTPATIENT)
Dept: GENERAL RADIOLOGY | Age: 56
Discharge: HOME | End: 2021-09-23
Attending: ORTHOPAEDIC SURGERY
Payer: COMMERCIAL

## 2021-09-23 ENCOUNTER — HOSPITAL ENCOUNTER (OUTPATIENT)
Dept: NON INVASIVE DIAGNOSTICS | Age: 56
Discharge: HOME | End: 2021-09-23
Payer: COMMERCIAL

## 2021-09-23 DIAGNOSIS — M25.561 RIGHT KNEE PAIN, UNSPECIFIED CHRONICITY: ICD-10-CM

## 2021-09-23 DIAGNOSIS — D64.9 ANEMIA, UNSPECIFIED TYPE: ICD-10-CM

## 2021-09-23 DIAGNOSIS — M17.11 PRIMARY OSTEOARTHRITIS OF RIGHT KNEE: ICD-10-CM

## 2021-09-23 DIAGNOSIS — D75.1 POLYCYTHEMIA: ICD-10-CM

## 2021-09-23 LAB
ALBUMIN SERPL-MCNC: 4 G/DL (ref 3.5–4.7)
ALBUMIN/GLOB SERPL: 1.3 {RATIO}
ALP SERPL-CCNC: 77 U/L (ref 38–126)
ALT SERPL-CCNC: 44 U/L (ref 3–52)
ANION GAP SERPL CALC-SCNC: 12 MMOL/L
AST SERPL W P-5'-P-CCNC: 33 U/L (ref 14–74)
BASOPHILS # BLD: 0.1 K/UL (ref 0–0.1)
BASOPHILS NFR BLD: 1 % (ref 0–2)
BILIRUB SERPL-MCNC: 0.6 MG/DL (ref 0.2–1)
BUN SERPL-MCNC: 9 MG/DL (ref 9–21)
BUN/CREAT SERPL: 15
CA-I BLD-MCNC: 9.6 MG/DL (ref 8.5–10.5)
CHLORIDE SERPL-SCNC: 97 MMOL/L (ref 94–111)
CO2 SERPL-SCNC: 29 MMOL/L (ref 21–33)
CREAT SERPL-MCNC: 0.6 MG/DL (ref 0.7–1.2)
DIFFERENTIAL METHOD BLD: ABNORMAL
EOSINOPHIL # BLD: 0.2 K/UL (ref 0–0.4)
EOSINOPHIL NFR BLD: 2 % (ref 0–5)
ERYTHROCYTE [DISTWIDTH] IN BLOOD BY AUTOMATED COUNT: 13.7 % (ref 11.6–14.5)
FERRITIN SERPL-MCNC: 16 NG/ML (ref 8–388)
GLOBULIN SER CALC-MCNC: 3.1 G/DL
GLUCOSE SERPL-MCNC: 327 MG/DL (ref 70–110)
HCT VFR BLD AUTO: 51 % (ref 35–45)
HGB BLD-MCNC: 16.2 G/DL (ref 12–16)
IMM GRANULOCYTES # BLD AUTO: 0 K/UL (ref 0–0.04)
IMM GRANULOCYTES NFR BLD AUTO: 0 % (ref 0–0.5)
IRON SATN MFR SERPL: 13 % (ref 20–50)
IRON SERPL-MCNC: 66 UG/DL (ref 50–175)
LYMPHOCYTES # BLD: 2.3 K/UL (ref 0.9–3.6)
LYMPHOCYTES NFR BLD: 30 % (ref 21–52)
MCH RBC QN AUTO: 28.6 PG (ref 24–34)
MCHC RBC AUTO-ENTMCNC: 31.8 G/DL (ref 31–37)
MCV RBC AUTO: 89.9 FL (ref 78–100)
MONOCYTES # BLD: 0.6 K/UL (ref 0.05–1.2)
MONOCYTES NFR BLD: 8 % (ref 3–10)
NEUTS SEG # BLD: 4.5 K/UL (ref 1.8–8)
NEUTS SEG NFR BLD: 59 % (ref 40–73)
NRBC # BLD: 0 K/UL (ref 0–0.01)
NRBC BLD-RTO: 0 PER 100 WBC
PLATELET # BLD AUTO: 206 K/UL (ref 135–420)
PMV BLD AUTO: 11 FL (ref 9.2–11.8)
POTASSIUM SERPL-SCNC: 4 MMOL/L (ref 3.2–5.1)
PROT SERPL-MCNC: 7.1 G/DL (ref 6.1–8.4)
RBC # BLD AUTO: 5.67 M/UL (ref 4.2–5.3)
SODIUM SERPL-SCNC: 138 MMOL/L (ref 135–145)
TIBC SERPL-MCNC: 492 UG/DL (ref 250–450)
WBC # BLD AUTO: 7.6 K/UL (ref 4.6–13.2)

## 2021-09-23 PROCEDURE — 36415 COLL VENOUS BLD VENIPUNCTURE: CPT

## 2021-09-23 PROCEDURE — 93005 ELECTROCARDIOGRAM TRACING: CPT

## 2021-09-23 PROCEDURE — 71046 X-RAY EXAM CHEST 2 VIEWS: CPT

## 2021-09-23 PROCEDURE — 80053 COMPREHEN METABOLIC PANEL: CPT

## 2021-09-23 PROCEDURE — 83540 ASSAY OF IRON: CPT

## 2021-09-23 PROCEDURE — 85025 COMPLETE CBC W/AUTO DIFF WBC: CPT

## 2021-09-23 PROCEDURE — 82728 ASSAY OF FERRITIN: CPT

## 2021-09-24 LAB
ATRIAL RATE: 74 BPM
BACTERIA SPEC CULT: ABNORMAL
BACTERIA SPEC CULT: ABNORMAL
CALCULATED P AXIS, ECG09: 66 DEGREES
CALCULATED R AXIS, ECG10: -8 DEGREES
CALCULATED T AXIS, ECG11: 43 DEGREES
DIAGNOSIS, 93000: NORMAL
P-R INTERVAL, ECG05: 160 MS
Q-T INTERVAL, ECG07: 414 MS
QRS DURATION, ECG06: 100 MS
QTC CALCULATION (BEZET), ECG08: 459 MS
SPECIAL REQUESTS,SREQ: ABNORMAL
VENTRICULAR RATE, ECG03: 74 BPM

## 2021-09-30 ENCOUNTER — OFFICE VISIT (OUTPATIENT)
Dept: FAMILY MEDICINE CLINIC | Age: 56
End: 2021-09-30
Payer: COMMERCIAL

## 2021-09-30 VITALS
HEART RATE: 72 BPM | TEMPERATURE: 97.6 F | BODY MASS INDEX: 33.43 KG/M2 | OXYGEN SATURATION: 94 % | DIASTOLIC BLOOD PRESSURE: 86 MMHG | SYSTOLIC BLOOD PRESSURE: 146 MMHG | RESPIRATION RATE: 18 BRPM | WEIGHT: 208 LBS | HEIGHT: 66 IN

## 2021-09-30 DIAGNOSIS — M25.561 CHRONIC PAIN OF RIGHT KNEE: ICD-10-CM

## 2021-09-30 DIAGNOSIS — Z85.3 HISTORY OF RIGHT BREAST CANCER: ICD-10-CM

## 2021-09-30 DIAGNOSIS — Z12.31 ENCOUNTER FOR SCREENING MAMMOGRAM FOR MALIGNANT NEOPLASM OF BREAST: ICD-10-CM

## 2021-09-30 DIAGNOSIS — M17.0 PRIMARY OSTEOARTHRITIS OF BOTH KNEES: ICD-10-CM

## 2021-09-30 DIAGNOSIS — G62.9 NEUROPATHY: ICD-10-CM

## 2021-09-30 DIAGNOSIS — E78.5 HYPERLIPIDEMIA, UNSPECIFIED HYPERLIPIDEMIA TYPE: ICD-10-CM

## 2021-09-30 DIAGNOSIS — E11.9 TYPE 2 DIABETES MELLITUS WITHOUT COMPLICATION, WITHOUT LONG-TERM CURRENT USE OF INSULIN (HCC): ICD-10-CM

## 2021-09-30 DIAGNOSIS — Z01.818 PREOP EXAMINATION: Primary | ICD-10-CM

## 2021-09-30 DIAGNOSIS — E78.00 HYPERCHOLESTEROLEMIA: ICD-10-CM

## 2021-09-30 DIAGNOSIS — Z12.11 SCREEN FOR COLON CANCER: ICD-10-CM

## 2021-09-30 DIAGNOSIS — E66.9 OBESITY (BMI 30-39.9): ICD-10-CM

## 2021-09-30 DIAGNOSIS — G89.29 CHRONIC PAIN OF RIGHT KNEE: ICD-10-CM

## 2021-09-30 DIAGNOSIS — G47.00 INSOMNIA, UNSPECIFIED TYPE: ICD-10-CM

## 2021-09-30 DIAGNOSIS — I10 ESSENTIAL HYPERTENSION: ICD-10-CM

## 2021-09-30 DIAGNOSIS — R23.2 HOT FLASHES: ICD-10-CM

## 2021-09-30 DIAGNOSIS — D75.1 POLYCYTHEMIA: ICD-10-CM

## 2021-09-30 LAB
CREAT SERPL-MCNC: 100 MG/DL
MICROALBUMIN UR TEST STR-MCNC: 150 MG/L
MICROALBUMIN/CREAT RATIO POC: >300 MG/G

## 2021-09-30 PROCEDURE — 82044 UR ALBUMIN SEMIQUANTITATIVE: CPT | Performed by: FAMILY MEDICINE

## 2021-09-30 PROCEDURE — 99214 OFFICE O/P EST MOD 30 MIN: CPT | Performed by: FAMILY MEDICINE

## 2021-09-30 RX ORDER — METFORMIN HYDROCHLORIDE 1000 MG/1
1000 TABLET ORAL 2 TIMES DAILY WITH MEALS
Qty: 180 TABLET | Refills: 1 | Status: SHIPPED | OUTPATIENT
Start: 2021-09-30 | End: 2022-02-01 | Stop reason: SDUPTHER

## 2021-09-30 RX ORDER — ZOLPIDEM TARTRATE 10 MG/1
TABLET ORAL
Qty: 30 TABLET | Refills: 2 | Status: SHIPPED | OUTPATIENT
Start: 2021-09-30 | End: 2021-11-17

## 2021-09-30 RX ORDER — SIMVASTATIN 40 MG/1
40 TABLET, FILM COATED ORAL
Qty: 90 TABLET | Refills: 1 | Status: SHIPPED | OUTPATIENT
Start: 2021-09-30 | End: 2022-02-01 | Stop reason: SDUPTHER

## 2021-09-30 RX ORDER — ZOSTER VACCINE RECOMBINANT, ADJUVANTED 50 MCG/0.5
0.5 KIT INTRAMUSCULAR ONCE
Qty: 0.5 ML | Refills: 0 | Status: SHIPPED | OUTPATIENT
Start: 2021-09-30 | End: 2021-09-30

## 2021-09-30 RX ORDER — VENLAFAXINE 37.5 MG/1
75 TABLET ORAL DAILY
Qty: 180 TABLET | Refills: 1 | Status: SHIPPED | OUTPATIENT
Start: 2021-09-30 | End: 2022-02-01 | Stop reason: SDUPTHER

## 2021-09-30 RX ORDER — MELOXICAM 15 MG/1
15 TABLET ORAL DAILY
Qty: 30 TABLET | Refills: 0 | Status: ON HOLD | OUTPATIENT
Start: 2021-09-30 | End: 2021-10-14 | Stop reason: SINTOL

## 2021-09-30 RX ORDER — LOSARTAN POTASSIUM 25 MG/1
25 TABLET ORAL DAILY
Qty: 90 TABLET | Refills: 1 | Status: SHIPPED | OUTPATIENT
Start: 2021-09-30 | End: 2021-10-17

## 2021-09-30 RX ORDER — FUROSEMIDE 20 MG/1
20 TABLET ORAL
Qty: 90 TABLET | Refills: 1 | Status: SHIPPED | OUTPATIENT
Start: 2021-09-30 | End: 2022-08-03 | Stop reason: SDUPTHER

## 2021-09-30 NOTE — PROGRESS NOTES
MEHUL Franklin comes in for preop evaluation:  Referring physician: Dr Shari Sparrow. Date of procedure:10/14/2021  Planned procedure: Right total knee arthroplasty  Indication for procedure: Chronic right knee pain and osteoarthritis    Patient has chronic right knee pain and osteoarthritis. Has been followed up by the orthopedist.  Pain has been getting gradually worse and interferes with her activities of daily living. She has tried conservative management. She is now scheduled to have right total knee arthroplasty. She has had CBC done that is stable. Her electrolytes are also stable. She has had an EKG done that is stable. Chest x-ray is normal.  She is medically stabilized and cleared to proceed with planned procedure. HTN: Patient has hypertension. She is on losartan 25 mg daily. Blood pressure has been stable. Today slightly elevated. She denies headache, changes in vision or focal weakness. We will continue with the current treatment plan. She will keep a blood pressure log and I will follow-up at next visit. Insomnia: Patient has insomnia. She is on Ambien. Requests refill of medication. Prescription will be sent in.  DM2: Patient has type 2 diabetes mellitus. She takes Metformin 1000 mg twice a day. States that her blood glucose numbers have been stable. We will check a microalbumin. Her last HbA1c was 9.2. We will request a recheck of this. Menopausal symptoms: Patient has menopausal symptoms. She takes venlafaxine for this. Stable on the medication. Continue current treatment plan. Neuropathy: Patient has neuropathy. She takes gabapentin. She will continue with medication. Has numbness and tingling of the hands and the feet. Dyslipidemia: Patient has dyslipidemia. She will exercise and take a diet low in polysaturated fats. She is on simvastatin. Breast cancer: Patient has a history of right breast cancer.   She had right mastectomy and chemotherapy and the took Femara. She is off the medication now. Currently stable. Polycythemia: Patient has a history of polycythemia. She has been followed up with a hematologist/oncologist.  Stable. Obesity: Patient has a BMI of 33.57. She will intensify lifestyle and dietary modification. Health maintenance: Patient will have flu vaccine administered at her workplace. I will give a prescription for the shingles vaccine. We will place a referral for screening colonoscopy. We will also place a referral for mammogram.      Past Medical History  Past Medical History:   Diagnosis Date    BRCA1 positive 2012    [facility] [facility] 2013    right breast    [facility] [address] [facility]     Diabetes ([facility] [address] [facility]     Hypercholesterolemia     Hypertension     Insomnia     Menopause 2012    chemo put her in menopause    Obesity (BMI 30.0-34. 9)     UTI (urinary tract infection)        Surgical History  Past Surgical History:   Procedure Laterality Date    HX CARPAL TUNNEL RELEASE Bilateral     HX  SECTION      HX CHOLECYSTECTOMY      HX HYSTERECTOMY  2009    partial    HX KNEE ARTHROSCOPY Bilateral     HX MASTECTOMY Right 2013    HX TUBAL LIGATION          Medications  Current Outpatient Medications   Medication Sig Dispense Refill    meloxicam (MOBIC) 15 mg tablet Take 1 tablet by mouth once daily 30 Tablet 0    zolpidem (AMBIEN) 10 mg tablet TAKE 1 TABLET BY MOUTH NIGHTLY AS NEEDED FOR SLEEP 30 Tablet 0    gabapentin (NEURONTIN) 100 mg capsule TAKE 2 CAPSULEs BY MOUTH IN MORNING THEN 1 CAPSULE AFTERNOON AND 1 CAPSULE AT BEDTIME. MAX 4 CAPS /24  Capsule 3    metFORMIN (GLUCOPHAGE) 1,000 mg tablet Take 1 Tablet by mouth two (2) times daily (with meals). 180 Tablet 1    furosemide (Lasix) 20 mg tablet Take 1 Tab by mouth daily as needed for Other (severe swelling). 90 Tab 1    losartan (COZAAR) 25 mg tablet Take 1 Tab by mouth daily. 90 Tab 1    simvastatin (ZOCOR) 40 mg tablet Take 1 Tab by mouth nightly.  90 Tab 1  venlafaxine (EFFEXOR) 37.5 mg tablet Take 2 Tabs by mouth daily. 180 Tab 1       Allergies  Allergies   Allergen Reactions    Aspirin Swelling    Codeine Swelling    Penicillins Swelling and Hives       Family History  Family History   Problem Relation Age of Onset    Cancer Mother     COPD Mother     Breast Cancer Mother     Alcohol abuse Father     Diabetes Father     Hypertension Father     Heart Disease Father     Elevated Lipids Father     Hypertension Brother     Hypertension Maternal Grandmother     Hypertension Maternal Grandfather        Social History  Social History     Socioeconomic History    Marital status:      Spouse name: Not on file    Number of children: Not on file    Years of education: Not on file    Highest education level: Not on file   Occupational History    Not on file   Tobacco Use    Smoking status: Current Every Day Smoker     Packs/day: 1.00    Smokeless tobacco: Never Used   Vaping Use    Vaping Use: Never used   Substance and Sexual Activity    Alcohol use: Not Currently     Comment: occassionl    Drug use: No    Sexual activity: Not Currently     Partners: Male   Other Topics Concern    Not on file   Social History Narrative    Not on file     Social Determinants of Health     Financial Resource Strain:     Difficulty of Paying Living Expenses:    Food Insecurity:     Worried About Running Out of Food in the Last Year:     Ran Out of Food in the Last Year:    Transportation Needs:     Lack of Transportation (Medical):      Lack of Transportation (Non-Medical):    Physical Activity:     Days of Exercise per Week:     Minutes of Exercise per Session:    Stress:     Feeling of Stress :    Social Connections:     Frequency of Communication with Friends and Family:     Frequency of Social Gatherings with Friends and Family:     Attends [employer] Services:     Active Member of Clubs or Organizations:     Attends Club or Organization Meetings:     Marital Status:    Intimate Partner Violence:     Fear of Current or Ex-Partner:     Emotionally Abused:     Physically Abused:     Sexually Abused:        Review of Systems  Review of Systems - Review of all systems is negative except as noted above in the HPI. Vital Signs  Visit Vitals  BP (!) 146/86   Pulse 72   Temp 97.6 °F (36.4 °C) (Oral)   Resp 18   Ht 5' 6\" (1.676 m)   Wt 208 lb (94.3 kg)   SpO2 94%   BMI 33.57 kg/m²         Physical Exam  Physical Examination: General appearance - alert, well appearing, and in no distress, oriented to person, place, and time, overweight and acyanotic, in no respiratory distress  Mental status - alert, oriented to person, place, and time, affect appropriate to mood  Neck - supple, no significant adenopathy  Lymphatics - no palpable lymphadenopathy, no hepatosplenomegaly  Chest - clear to auscultation, no wheezes, rales or rhonchi, symmetric air entry  Heart - normal rate, regular rhythm, normal S1, S2, no murmurs, rubs, clicks or gallops  Abdomen - soft, nontender, nondistended, no masses or organomegaly  Back exam - limited range of motion  Neurological - normal muscle tone, no tremors, strength 5/5  Musculoskeletal -discomfort to flexion extension movements right knee.   Discomfort also to palpation along the knee margins  Extremities - no pedal edema noted, intact peripheral pulses      Results  Results for orders placed or performed during the hospital encounter of 09/23/21   EKG, 12 LEAD, INITIAL   Result Value Ref Range    Ventricular Rate 74 BPM    Atrial Rate 74 BPM    P-R Interval 160 ms    QRS Duration 100 ms    Q-T Interval 414 ms    QTC Calculation (Bezet) 459 ms    Calculated P Axis 66 degrees    Calculated R Axis -8 degrees    Calculated T Axis 43 degrees    Diagnosis       Normal sinus rhythm  Normal ECG  When compared with ECG of 22-DEC-2017 20:24,  PREVIOUS ECG IS PRESENT  Confirmed by Isabel Ocampo ([id number]) on 9/24/2021 2:42:55 PM ASSESSMENT and PLAN    ICD-10-CM ICD-9-CM    1. Preop examination  Z01.818 V72.84    2. Chronic pain of right knee  M25.561 719.46     G89.29 338.29    3. Insomnia, unspecified type  G47.00 780.52 zolpidem (AMBIEN) 10 mg tablet   4. Primary osteoarthritis of both knees  M17.0 715.16 meloxicam (MOBIC) 15 mg tablet   5. Hot flashes  R23.2 782.62 venlafaxine (EFFEXOR) 37.5 mg tablet   6. Hypercholesterolemia  E78.00 272.0 simvastatin (ZOCOR) 40 mg tablet   7. Type 2 diabetes mellitus without complication, without long-term current use of insulin (HCC)  E11.9 250.00 metFORMIN (GLUCOPHAGE) 1,000 mg tablet      AMB POC URINE, MICROALBUMIN, SEMIQUANTITATIVE      HEMOGLOBIN A1C WITH EAG   8. Essential hypertension  I10 401.9 losartan (COZAAR) 25 mg tablet   9. History of right breast cancer  Z85.3 V10.3 furosemide (Lasix) 20 mg tablet   10. Screen for colon cancer  Z12.11 V76.51 REFERRAL TO GASTROENTEROLOGY   11. Encounter for screening mammogram for malignant neoplasm of breast  Z12.31 V76.12 [facility] MAMMO BI SCREENING INCL CAD   12. Polycythemia  D75.1 238.4    13. Neuropathy  G62.9 355.9    14. Obesity (BMI 30-39. 9)  E66.9 278.00    15.  Hyperlipidemia, unspecified hyperlipidemia type  E78.5 272.4 LIPID PANEL     lab results and schedule of future lab studies reviewed with patient  reviewed diet, exercise and weight control  cardiovascular risk and specific lipid/LDL goals reviewed  reviewed medications and side effects in detail  specific diabetic recommendations: low cholesterol diet, weight control and daily exercise discussed, home glucose monitoring emphasized, all medications, side effects and compliance discussed carefully, annual eye examinations at Ophthalmology discussed, glycohemoglobin and other lab monitoring discussed and long term diabetic complications discussed  radiology results and schedule of future radiology studies reviewed with patient      I have discussed the diagnosis with the patient and the intended plan of care as seen in the above orders. The patient has received an after-visit summary and questions were answered concerning future plans. I have discussed medication, side effects, and warnings with the patient in detail. The patient verbalized understanding and is in agreement with the plan of care. The patient will contact the office with any additional concerns. Sabina Núñez MD    PLEASE NOTE:   This document has been produced using voice recognition software.  Unrecognized errors in transcription may be present

## 2021-09-30 NOTE — PROGRESS NOTES
Chief Complaint   Patient presents with    Pre-op Exam     1. Have you been to the ER, urgent care clinic since your last visit? Hospitalized since your last visit? No    2. Have you seen or consulted any other health care providers outside of the [address] [place] [address] since your last visit? Include any pap smears or colon screening.  No

## 2021-10-04 ENCOUNTER — ANESTHESIA EVENT (OUTPATIENT)
Dept: SURGERY | Age: 56
End: 2021-10-04
Payer: COMMERCIAL

## 2021-10-06 DIAGNOSIS — M17.11 PRIMARY OSTEOARTHRITIS OF RIGHT KNEE: Primary | ICD-10-CM

## 2021-10-07 ENCOUNTER — HOSPITAL ENCOUNTER (OUTPATIENT)
Dept: PREADMISSION TESTING | Age: 56
Discharge: HOME | End: 2021-10-07
Payer: COMMERCIAL

## 2021-10-07 ENCOUNTER — OFFICE VISIT (OUTPATIENT)
Dept: ORTHOPEDIC SURGERY | Age: 56
End: 2021-10-07
Payer: COMMERCIAL

## 2021-10-07 DIAGNOSIS — M17.11 OSTEOARTHRITIS OF RIGHT KNEE, UNSPECIFIED OSTEOARTHRITIS TYPE: ICD-10-CM

## 2021-10-07 DIAGNOSIS — M25.561 RIGHT KNEE PAIN, UNSPECIFIED CHRONICITY: Primary | ICD-10-CM

## 2021-10-07 LAB — SARS-COV-2, COV2: NORMAL

## 2021-10-07 PROCEDURE — U0003 INFECTIOUS AGENT DETECTION BY NUCLEIC ACID (DNA OR RNA); SEVERE ACUTE RESPIRATORY SYNDROME CORONAVIRUS 2 (SARS-COV-2) (CORONAVIRUS DISEASE [COVID-19]), AMPLIFIED PROBE TECHNIQUE, MAKING USE OF HIGH THROUGHPUT TECHNOLOGIES AS DESCRIBED BY CMS-2020-01-R: HCPCS

## 2021-10-07 PROCEDURE — 99214 OFFICE O/P EST MOD 30 MIN: CPT | Performed by: ORTHOPAEDIC SURGERY

## 2021-10-07 RX ORDER — ONDANSETRON 4 MG/1
4 TABLET, ORALLY DISINTEGRATING ORAL
Qty: 20 TABLET | Refills: 0 | Status: SHIPPED | OUTPATIENT
Start: 2021-10-07 | End: 2021-11-16

## 2021-10-07 RX ORDER — OXYCODONE AND ACETAMINOPHEN 5; 325 MG/1; MG/1
1 TABLET ORAL
Qty: 30 TABLET | Refills: 0 | Status: SHIPPED | OUTPATIENT
Start: 2021-10-07 | End: 2021-10-21 | Stop reason: SDUPTHER

## 2021-10-07 RX ORDER — CLINDAMYCIN HYDROCHLORIDE 300 MG/1
300 CAPSULE ORAL EVERY 8 HOURS
Qty: 3 CAPSULE | Refills: 0 | Status: SHIPPED | OUTPATIENT
Start: 2021-10-07 | End: 2021-10-08

## 2021-10-07 NOTE — H&P (VIEW-ONLY)
Name: Tania Stahl    : 1965     Service Dept: [facility]    Patient's Pharmacies:    [address]  [address], [address]  [place] [address]  Phone: [phone] Fax: [address]  [address] [address]  Phone: [phone] Fax: [phone]       Chief Complaint   Patient presents with    Pre-op Exam    Knee Pain        There were no vitals taken for this visit. Allergies   Allergen Reactions    Aspirin Swelling    Codeine Swelling    Penicillins Swelling and Hives      Current Outpatient Medications   Medication Sig Dispense Refill    zolpidem (AMBIEN) 10 mg tablet TAKE 1 TABLET BY MOUTH NIGHTLY AS NEEDED FOR SLEEP 30 Tablet 2    meloxicam (MOBIC) 15 mg tablet Take 1 Tablet by mouth daily. 30 Tablet 0    venlafaxine (EFFEXOR) 37.5 mg tablet Take 2 Tablets by mouth daily. 180 Tablet 1    simvastatin (ZOCOR) 40 mg tablet Take 1 Tablet by mouth nightly. 90 Tablet 1    metFORMIN (GLUCOPHAGE) 1,000 mg tablet Take 1 Tablet by mouth two (2) times daily (with meals). 180 Tablet 1    losartan (COZAAR) 25 mg tablet Take 1 Tablet by mouth daily. 90 Tablet 1    furosemide (Lasix) 20 mg tablet Take 1 Tablet by mouth daily as needed for PRN Indication (Other) (severe swelling). 90 Tablet 1    gabapentin (NEURONTIN) 100 mg capsule TAKE 2 CAPSULEs BY MOUTH IN MORNING THEN 1 CAPSULE AFTERNOON AND 1 CAPSULE AT BEDTIME.  MAX 4 CAPS /24  Capsule 3      Patient Active Problem List   Diagnosis Code    Severe obesity ([address] E66.01    Diabetes ([address] E11.9    Hyperlipidemia E78.5    Hypertension I10    Insomnia G47.00    At risk for bone density loss Z91.89    Breast CA (HCC) C50.919    Family history of breast cancer Z80.3    GERD (gastroesophageal reflux disease) K21.9    Hot flashes related to aromatase inhibitor therapy R23.2, T45.1X5A    Increased glucose level R73.09    Lymphedema I89.0    Osteoarthritis of knee M17.10    S/P cholecystectomy Z90.49    Tobacco abuse Z72.0      Family History   Problem Relation Age of Onset    Cancer Mother     COPD Mother     Breast Cancer Mother     Alcohol abuse Father     Diabetes Father     Hypertension Father     Heart Disease Father     Elevated Lipids Father     Hypertension Brother     Hypertension Maternal Grandmother     Hypertension Maternal Grandfather       Social History     Socioeconomic History    Marital status:      Spouse name: Not on file    Number of children: Not on file    Years of education: Not on file    Highest education level: Not on file   Tobacco Use    Smoking status: Current Every Day Smoker     Packs/day: 1.00    Smokeless tobacco: Never Used   Vaping Use    Vaping Use: Never used   Substance and Sexual Activity    Alcohol use: Not Currently     Comment: occassionl    Drug use: No    Sexual activity: Not Currently     Partners: Male     Social Determinants of Health     Financial Resource Strain:     Difficulty of Paying Living Expenses:    Food Insecurity:     Worried About Running Out of Food in the Last Year:     Ran Out of Food in the Last Year:    Transportation Needs:     Lack of Transportation (Medical):      Lack of Transportation (Non-Medical):    Physical Activity:     Days of Exercise per Week:     Minutes of Exercise per Session:    Stress:     Feeling of Stress :    Social Connections:     Frequency of Communication with Friends and Family:     Frequency of Social Gatherings with Friends and Family:     Attends S[employer] Services:     Active Member of Clubs or Organizations:     Attends Club or Organization Meetings:     Marital Status:       Past Surgical History:   Procedure Laterality Date    HX CARPAL TUNNEL RELEASE Bilateral     HX  SECTION      HX CHOLECYSTECTOMY  HX HYSTERECTOMY  2009    partial    HX KNEE ARTHROSCOPY Bilateral     HX MASTECTOMY Right 2013    HX TUBAL LIGATION        Past Medical History:   Diagnosis Date    BRCA1 positive 2012    Breast cancer ([facility] [address] 2013    right breast    Breast cancer ([facility] [address] [facility]     Diabetes ([facility] [address] [facility]     Hypercholesterolemia     Hypertension     Insomnia     Menopause 2012    chemo put her in menopause    Obesity (BMI 30.0-34. 9)     UTI (urinary tract infection)         I have reviewed and agree with PFSH and ROS and intake form in chart and the record furthermore I have reviewed prior medical record(s) regarding this patients care during this appointment. Review of Systems:   Patient is a pleasant appearing individual, appropriately dressed, well hydrated, well nourished, who is alert, appropriately oriented for age, and in no acute distress with a normal gait and normal affect who does not appear to be in any significant pain. Physical Exam:  Right Knee -Decrease range of motion with flexion, Knee arc of greater than 50 degrees, Some crepitation, Grossly neurovascularly intact, Good cap refill, No skin lesion, Moderate swelling, No gross instability, Some quadriceps weakness, Kellgren and Jose at least grade 3    Left Knee - Full Range of Motion, No crepitation, Grossly neurovascularly intact, Good cap refill, No skin lesion, No swelling, No gross instability, No quadriceps weakness    Inpatient status: The patient has admitted to severe pain in the affected knee and due to such pain they are unable to complete activities of daily living at home and/or work on a regular basis where conservative treatments have failed. After extensive discussion with the patient, they have chosen to receive a total knee replacement with the expectation of inpatient procedure.  Their dependent functional status (i.e. lack of capable support and safety at home, pain management, comorbities, or difficulty ambulating with assistive walking devices) would deem them a candidate for an inpatient stay. The patient acknowledges and understand the plan. The risks of surgery were explained to the patient which include but not limited to infection, nerve injury, artery injury, tendon injury, poor result, poor wound healing, unforeseen incidence, bleeding, infection, nerve damage, failure to improve, worsening of symptoms, morbidity, and mortality risks were explained. All questions were answered. Patient was told of no guarantees. Patient accepts all risks and benefits. A consent for surgery will be documented and signed by the patient or a legal guardian. All questions were answered. The procedure was explained in detail. The patient was counseled about the risks of jud Covid-19 during their perioperative period and any recovery window from their procedure. The patient was made aware that jud Covid-19 may worsen their prognosis for recovering from their procedure and lend to a higher morbidity and/or mortality risk. All material risks, benefits, and reasonable alternatives including postponing the procedure were discussed. The patient DOES wish to proceed with their procedure at this time. Encounter Diagnoses     ICD-10-CM ICD-9-CM   1. Right knee pain, unspecified chronicity  M25.561 719.46   2. Osteoarthritis of right knee, unspecified osteoarthritis type  M17.11 715.96       HPI:  The patient is here with a chief complaint of right knee pain, dull throbbing pain. It has been the same. Pain is 7/10. Failed conservative treatment. Assessment/Plan:  Plan would be for right total knee replacement. General medical clearance has been done. We will do Percocet, clindamycin, Zofran, and Eliquis 2.5 mg q.12h. for 3 weeks because she has got anaphylactic reaction to aspirin.       As part of continued conservative pain management options the patient was advised to utilize Tylenol or OTC NSAIDS as long as it is not medically contraindicated. Return to Office: Follow-up and Dispositions    · Return for already scheduled for surgery. Scribed by Fabrice Wilson LPN as dictated by [facility] NARDA Botello MD.  Documentation True and Accepted Moises NARDA Botello MD

## 2021-10-07 NOTE — PROGRESS NOTES
Name: Fany Rodriguez    : 1965     Service Dept: [address] [facility]    Patient's Pharmacies:    [facility] [address], [address] [place] [id number] [facility]  [place] [address] [address]  Phone: [phone] Fax: [address], [address] [place] [id number] [address]  Phone: [phone] Fax: [phone]       Chief Complaint   Patient presents with    Pre-op Exam    Knee Pain        There were no vitals taken for this visit. Allergies   Allergen Reactions    Aspirin Swelling    Codeine Swelling    Penicillins Swelling and Hives      Current Outpatient Medications   Medication Sig Dispense Refill    zolpidem (AMBIEN) 10 mg tablet TAKE 1 TABLET BY MOUTH NIGHTLY AS NEEDED FOR SLEEP 30 Tablet 2    meloxicam (MOBIC) 15 mg tablet Take 1 Tablet by mouth daily. 30 Tablet 0    venlafaxine (EFFEXOR) 37.5 mg tablet Take 2 Tablets by mouth daily. 180 Tablet 1    simvastatin (ZOCOR) 40 mg tablet Take 1 Tablet by mouth nightly. 90 Tablet 1    metFORMIN (GLUCOPHAGE) 1,000 mg tablet Take 1 Tablet by mouth two (2) times daily (with meals). 180 Tablet 1    losartan (COZAAR) 25 mg tablet Take 1 Tablet by mouth daily. 90 Tablet 1    furosemide (Lasix) 20 mg tablet Take 1 Tablet by mouth daily as needed for PRN Indication (Other) (severe swelling). 90 Tablet 1    gabapentin (NEURONTIN) 100 mg capsule TAKE 2 CAPSULEs BY MOUTH IN MORNING THEN 1 CAPSULE AFTERNOON AND 1 CAPSULE AT BEDTIME.  MAX 4 CAPS /24  Capsule 3      Patient Active Problem List   Diagnosis Code    Severe obesity ([address] E66.01    Diabetes ([address] E11.9    Hyperlipidemia E78.5    Hypertension I10    Insomnia G47.00    At risk for bone density loss Z91.89    Breast CA (HCC) C50.919    Family history of breast cancer Z80.3    GERD (gastroesophageal reflux disease) K21.9    Hot flashes related to aromatase inhibitor therapy R23.2, T45.1X5A    Increased glucose level R73.09    Lymphedema I89.0    Osteoarthritis of knee M17.10    S/P cholecystectomy Z90.49    Tobacco abuse Z72.0      Family History   Problem Relation Age of Onset    Cancer Mother     COPD Mother     Breast Cancer Mother     Alcohol abuse Father     Diabetes Father     Hypertension Father     Heart Disease Father     Elevated Lipids Father     Hypertension Brother     Hypertension Maternal Grandmother     Hypertension Maternal Grandfather       Social History     Socioeconomic History    Marital status:      Spouse name: Not on file    Number of children: Not on file    Years of education: Not on file    Highest education level: Not on file   Tobacco Use    Smoking status: Current Every Day Smoker     Packs/day: 1.00    Smokeless tobacco: Never Used   Vaping Use    Vaping Use: Never used   Substance and Sexual Activity    Alcohol use: Not Currently     Comment: occassionl    Drug use: No    Sexual activity: Not Currently     Partners: Male     Social Determinants of Health     Financial Resource Strain:     Difficulty of Paying Living Expenses:    Food Insecurity:     Worried About Running Out of Food in the Last Year:     Ran Out of Food in the Last Year:    Transportation Needs:     Lack of Transportation (Medical):      Lack of Transportation (Non-Medical):    Physical Activity:     Days of Exercise per Week:     Minutes of Exercise per Session:    Stress:     Feeling of Stress :    Social Connections:     Frequency of Communication with Friends and Family:     Frequency of Social Gatherings with Friends and Family:     Attends Judaism Services:     Active Member of Clubs or Organizations:     Attends Club or Organization Meetings:     Marital Status:       Past Surgical History:   Procedure Laterality Date    HX CARPAL TUNNEL RELEASE Bilateral     HX  SECTION      HX CHOLECYSTECTOMY  HX HYSTERECTOMY  2009    partial    HX KNEE ARTHROSCOPY Bilateral     HX MASTECTOMY Right 2013    HX TUBAL LIGATION        Past Medical History:   Diagnosis Date    BRCA1 positive 2012    [facility] ([facility] 2013    right breast    Breast cancer [facility]     Diabetes ([facility]     Hypercholesterolemia     Hypertension     Insomnia     Menopause 2012    chemo put her in menopause    Obesity (BMI 30.0-34. 9)     UTI (urinary tract infection)         I have reviewed and agree with PFSH and ROS and intake form in chart and the record furthermore I have reviewed prior medical record(s) regarding this patients care during this appointment. Review of Systems:   Patient is a pleasant appearing individual, appropriately dressed, well hydrated, well nourished, who is alert, appropriately oriented for age, and in no acute distress with a normal gait and normal affect who does not appear to be in any significant pain. Physical Exam:  Right Knee -Decrease range of motion with flexion, Knee arc of greater than 50 degrees, Some crepitation, Grossly neurovascularly intact, Good cap refill, No skin lesion, Moderate swelling, No gross instability, Some quadriceps weakness, Kellgren and Jose at least grade 3    Left Knee - Full Range of Motion, No crepitation, Grossly neurovascularly intact, Good cap refill, No skin lesion, No swelling, No gross instability, No quadriceps weakness    Inpatient status: The patient has admitted to severe pain in the affected knee and due to such pain they are unable to complete activities of daily living at home and/or work on a regular basis where conservative treatments have failed. After extensive discussion with the patient, they have chosen to receive a total knee replacement with the expectation of inpatient procedure.  Their dependent functional status (i.e. lack of capable support and safety at home, pain management, comorbities, or difficulty ambulating with assistive walking devices) would deem them a candidate for an inpatient stay. The patient acknowledges and understand the plan. The risks of surgery were explained to the patient which include but not limited to infection, nerve injury, artery injury, tendon injury, poor result, poor wound healing, unforeseen incidence, bleeding, infection, nerve damage, failure to improve, worsening of symptoms, morbidity, and mortality risks were explained. All questions were answered. Patient was told of no guarantees. Patient accepts all risks and benefits. A consent for surgery will be documented and signed by the patient or a legal guardian. All questions were answered. The procedure was explained in detail. The patient was counseled about the risks of jud Covid-19 during their perioperative period and any recovery window from their procedure. The patient was made aware that jud Covid-19 may worsen their prognosis for recovering from their procedure and lend to a higher morbidity and/or mortality risk. All material risks, benefits, and reasonable alternatives including postponing the procedure were discussed. The patient DOES wish to proceed with their procedure at this time. Encounter Diagnoses     ICD-10-CM ICD-9-CM   1. Right knee pain, unspecified chronicity  M25.561 719.46   2. Osteoarthritis of right knee, unspecified osteoarthritis type  M17.11 715.96       HPI:  The patient is here with a chief complaint of right knee pain, dull throbbing pain. It has been the same. Pain is 7/10. Failed conservative treatment. Assessment/Plan:  Plan would be for right total knee replacement. General medical clearance has been done. We will do Percocet, clindamycin, Zofran, and Eliquis 2.5 mg q.12h. for 3 weeks because she has got anaphylactic reaction to aspirin.       As part of continued conservative pain management options the patient was advised to utilize Tylenol or OTC NSAIDS as long as it is not medically contraindicated. Return to Office: Follow-up and Dispositions    · Return for already scheduled for surgery. Scribed by Garrison Bahena LPN as dictated by [facility] NARDA Anderson MD.  Documentation True and Accepted [facility] NARDA Anderson MD

## 2021-10-07 NOTE — LETTER
10/8/2021    Patient: Kenia Barreto   YOB: 1965   Date of Visit: 10/7/2021     Wilmar Shen MD  [facility]    Dear Wilmar Shen MD,      Thank you for referring Ms. Mina Bassett to [address] [place] [address] AND [facility] for evaluation. My notes for this consultation are attached. If you have questions, please do not hesitate to call me. I look forward to following your patient along with you.       Sincerely,    Tay Carreon MD

## 2021-10-07 NOTE — PATIENT INSTRUCTIONS
Knee Arthritis: Care Instructions  Your Care Instructions     Knee arthritis is a breakdown of the cartilage that cushions your knee joint. When the cartilage wears down, your bones rub against each other. This causes pain and stiffness. Knee arthritis tends to get worse with time. Treatment for knee arthritis involves reducing pain, making the leg muscles stronger, and staying at a healthy body weight. The treatment usually does not improve the health of the cartilage, but it can reduce pain and improve how well your knee works. You can take simple measures to protect your knee joints, ease your pain, and help you stay active. Follow-up care is a key part of your treatment and safety. Be sure to make and go to all appointments, and call your doctor if you are having problems. It's also a good idea to know your test results and keep a list of the medicines you take. How can you care for yourself at home? · Know that knee arthritis will cause more pain on some days than on others. · Stay at a healthy weight. Lose weight if you are overweight. When you stand up, the pressure on your knees from every pound of body weight is multiplied four times. So if you lose 10 pounds, you will reduce the pressure on your knees by 40 pounds. · Talk to your doctor or physical therapist about exercises that will help ease joint pain. ? Stretch to help prevent stiffness and to prevent injury before you exercise. You may enjoy gentle forms of yoga to help keep your knee joints and muscles flexible. ? Walk instead of jog.  ? Ride a bike. This makes your thigh muscles stronger and takes pressure off your knee. ? Wear well-fitting and comfortable shoes. ? Exercise in chest-deep water. This can help you exercise longer with less pain. ? Avoid exercises that include squatting or kneeling. They can put a lot of strain on your knees.   ? Talk to your doctor to make sure that the exercise you do is not making the arthritis worse.  · Do not sit for long periods of time. Try to walk once in a while to keep your knee from getting stiff. · Ask your doctor or physical therapist whether shoe inserts may reduce your arthritis pain. · If you can afford it, get new athletic shoes at least every year. This can help reduce the strain on your knees. · Use a device to help you do everyday activities. ? A cane or walking stick can help you keep your balance when you walk. Hold the cane or walking stick in the hand opposite the painful knee. ? If you feel like you may fall when you walk, try using crutches or a front-wheeled walker. These can prevent falls that could cause more damage to your knee. ? A knee brace may help keep your knee stable and prevent pain. ? You also can use other things to make life easier, such as a higher toilet seat and handrails in the bathtub or shower. · Take pain medicines exactly as directed. ? Do not wait until you are in severe pain. You will get better results if you take it sooner. ? If you are not taking a prescription pain medicine, take an over-the-counter medicine such as acetaminophen (Tylenol), ibuprofen (Advil, Motrin), or naproxen (Aleve). Read and follow all instructions on the label. ? Do not take two or more pain medicines at the same time unless the doctor told you to. Many pain medicines have acetaminophen, which is Tylenol. Too much acetaminophen (Tylenol) can be harmful. ? Tell your doctor if you take a blood thinner, have diabetes, or have allergies to shellfish. · Ask your doctor if you might benefit from a shot of steroid medicine into your knee. This may provide pain relief for several months. · Many people take the supplements glucosamine and chondroitin for osteoarthritis. Some people feel they help, but the medical research does not show that they work. Talk to your doctor before you take these supplements. When should you call for help?    Call your doctor now or seek immediate medical care if:    · You have sudden swelling, warmth, or pain in your knee.     · You have knee pain and a fever or rash.     · You have such bad pain that you cannot use your knee. Watch closely for changes in your health, and be sure to contact your doctor if you have any problems. Where can you learn more? Go to http://www.gray.com/  Enter W187 in the search box to learn more about \"Knee Arthritis: Care Instructions. \"  Current as of: April 30, 2021               Content Version: 13.0  © 7405-9200 [employer]. Care instructions adapted under license by [employer] (which disclaims liability or warranty for this information). If you have questions about a medical condition or this instruction, always ask your healthcare professional. [address] any warranty or liability for your use of this information.

## 2021-10-09 LAB — SARS-COV-2, COV2NT: NOT DETECTED

## 2021-10-11 ENCOUNTER — TELEPHONE (OUTPATIENT)
Dept: ORTHOPEDIC SURGERY | Age: 56
End: 2021-10-11

## 2021-10-11 ENCOUNTER — TELEPHONE (OUTPATIENT)
Dept: ONCOLOGY | Age: 56
End: 2021-10-11

## 2021-10-11 NOTE — TELEPHONE ENCOUNTER
Pt has some questions about her medication for surgery.  She has surgery this coming up Thursday- she sees  Hematology

## 2021-10-11 NOTE — TELEPHONE ENCOUNTER
Patient contacted office an is scheduled for a surgery on Thursday the 13th. She is wondering about her current medications and her hematology stand point if she should be stopping any of her meds for this surgery. Please call patient.

## 2021-10-12 ENCOUNTER — TELEPHONE (OUTPATIENT)
Dept: ORTHOPEDIC SURGERY | Age: 56
End: 2021-10-12

## 2021-10-13 NOTE — TELEPHONE ENCOUNTER
anthony states they have nothing on file for patient. Called pt and LM to clarify pharm she is trying to fill elaquis.

## 2021-10-14 ENCOUNTER — ANESTHESIA (OUTPATIENT)
Dept: SURGERY | Age: 56
End: 2021-10-14
Payer: COMMERCIAL

## 2021-10-14 ENCOUNTER — APPOINTMENT (OUTPATIENT)
Dept: GENERAL RADIOLOGY | Age: 56
End: 2021-10-14
Attending: NURSE PRACTITIONER
Payer: COMMERCIAL

## 2021-10-14 ENCOUNTER — HOSPITAL ENCOUNTER (OUTPATIENT)
Age: 56
Discharge: HOME | End: 2021-10-14
Attending: ORTHOPAEDIC SURGERY | Admitting: NURSE PRACTITIONER
Payer: COMMERCIAL

## 2021-10-14 VITALS
DIASTOLIC BLOOD PRESSURE: 69 MMHG | BODY MASS INDEX: 33.33 KG/M2 | TEMPERATURE: 98 F | HEIGHT: 66 IN | HEART RATE: 73 BPM | SYSTOLIC BLOOD PRESSURE: 129 MMHG | RESPIRATION RATE: 16 BRPM | OXYGEN SATURATION: 97 % | WEIGHT: 207.4 LBS

## 2021-10-14 PROBLEM — M17.9 KNEE OSTEOARTHRITIS: Status: ACTIVE | Noted: 2021-10-14

## 2021-10-14 LAB
ABO + RH BLD: NORMAL
BLOOD GROUP ANTIBODIES SERPL: NEGATIVE
GLUCOSE BLD STRIP.AUTO-MCNC: 322 MG/DL (ref 70–110)
PERFORMED BY, TECHID: ABNORMAL
SPECIMEN EXP DATE BLD: NORMAL

## 2021-10-14 PROCEDURE — 77030006835 HC BLD SAW SAG STRY -B: Performed by: ORTHOPAEDIC SURGERY

## 2021-10-14 PROCEDURE — 74011000250 HC RX REV CODE- 250: Performed by: NURSE ANESTHETIST, CERTIFIED REGISTERED

## 2021-10-14 PROCEDURE — 77030031139 HC SUT VCRL2 J&J -A: Performed by: ORTHOPAEDIC SURGERY

## 2021-10-14 PROCEDURE — 82962 GLUCOSE BLOOD TEST: CPT

## 2021-10-14 PROCEDURE — 77030041690 HC SYS PINNING KN JNJ -D: Performed by: ORTHOPAEDIC SURGERY

## 2021-10-14 PROCEDURE — 76060000034 HC ANESTHESIA 1.5 TO 2 HR: Performed by: ORTHOPAEDIC SURGERY

## 2021-10-14 PROCEDURE — 76010000153 HC OR TIME 1.5 TO 2 HR: Performed by: ORTHOPAEDIC SURGERY

## 2021-10-14 PROCEDURE — 86900 BLOOD TYPING SEROLOGIC ABO: CPT

## 2021-10-14 PROCEDURE — 97116 GAIT TRAINING THERAPY: CPT

## 2021-10-14 PROCEDURE — 74011250636 HC RX REV CODE- 250/636: Performed by: NURSE PRACTITIONER

## 2021-10-14 PROCEDURE — C1776 JOINT DEVICE (IMPLANTABLE): HCPCS | Performed by: ORTHOPAEDIC SURGERY

## 2021-10-14 PROCEDURE — 77030011266 HC ELECTRD BLD INSL COVD -A: Performed by: ORTHOPAEDIC SURGERY

## 2021-10-14 PROCEDURE — C1713 ANCHOR/SCREW BN/BN,TIS/BN: HCPCS | Performed by: ORTHOPAEDIC SURGERY

## 2021-10-14 PROCEDURE — 36415 COLL VENOUS BLD VENIPUNCTURE: CPT

## 2021-10-14 PROCEDURE — 74011250637 HC RX REV CODE- 250/637: Performed by: NURSE ANESTHETIST, CERTIFIED REGISTERED

## 2021-10-14 PROCEDURE — 76210000025 HC REC RM PH II 3 TO 3.5 HR: Performed by: ORTHOPAEDIC SURGERY

## 2021-10-14 PROCEDURE — 77030018673: Performed by: ORTHOPAEDIC SURGERY

## 2021-10-14 PROCEDURE — 74011250636 HC RX REV CODE- 250/636: Performed by: NURSE ANESTHETIST, CERTIFIED REGISTERED

## 2021-10-14 PROCEDURE — 74011250637 HC RX REV CODE- 250/637: Performed by: NURSE PRACTITIONER

## 2021-10-14 PROCEDURE — 2709999900 HC NON-CHARGEABLE SUPPLY: Performed by: ORTHOPAEDIC SURGERY

## 2021-10-14 PROCEDURE — 77030040393 HC DRSG OPTIFOAM GENT MDII -B: Performed by: ORTHOPAEDIC SURGERY

## 2021-10-14 PROCEDURE — 73560 X-RAY EXAM OF KNEE 1 OR 2: CPT

## 2021-10-14 PROCEDURE — 77030010783 HC BOWL MX BN CEM J&J -B: Performed by: ORTHOPAEDIC SURGERY

## 2021-10-14 PROCEDURE — 97161 PT EVAL LOW COMPLEX 20 MIN: CPT

## 2021-10-14 PROCEDURE — 77030002933 HC SUT MCRYL J&J -A: Performed by: ORTHOPAEDIC SURGERY

## 2021-10-14 PROCEDURE — 77030002982 HC SUT POLYSRB J&J -A: Performed by: ORTHOPAEDIC SURGERY

## 2021-10-14 PROCEDURE — 76210000063 HC OR PH I REC FIRST 0.5 HR: Performed by: ORTHOPAEDIC SURGERY

## 2021-10-14 PROCEDURE — 74011000272 HC RX REV CODE- 272: Performed by: ORTHOPAEDIC SURGERY

## 2021-10-14 PROCEDURE — 74011000250 HC RX REV CODE- 250: Performed by: ORTHOPAEDIC SURGERY

## 2021-10-14 PROCEDURE — 77030013708 HC HNDPC SUC IRR PULS STRY –B: Performed by: ORTHOPAEDIC SURGERY

## 2021-10-14 PROCEDURE — 77030006812 HC BLD SAW RECIP STRY -B: Performed by: ORTHOPAEDIC SURGERY

## 2021-10-14 PROCEDURE — 77030040361 HC SLV COMPR DVT MDII -B: Performed by: ORTHOPAEDIC SURGERY

## 2021-10-14 PROCEDURE — 77030029372 HC ADH SKN CLSR PRINEO J&J -C: Performed by: ORTHOPAEDIC SURGERY

## 2021-10-14 DEVICE — INSERT TIB SZ 6 THK5MM KNEE POST STBL ROT PLATFRM ATTUNE: Type: IMPLANTABLE DEVICE | Site: KNEE | Status: FUNCTIONAL

## 2021-10-14 DEVICE — BASEPLATE TIB SZ 5 KNEE ROT PLATFRM CEM SYS ATTUNE: Type: IMPLANTABLE DEVICE | Site: KNEE | Status: FUNCTIONAL

## 2021-10-14 DEVICE — CEMENT BNE 40GM FULL DOSE PMMA W/ GENT HI VISC RADPQ LNG: Type: IMPLANTABLE DEVICE | Site: KNEE | Status: FUNCTIONAL

## 2021-10-14 DEVICE — COMPONENT PAT DIA35MM KNEE POLY DOME CEM MEDIALIZED ATTUNE: Type: IMPLANTABLE DEVICE | Site: KNEE | Status: FUNCTIONAL

## 2021-10-14 DEVICE — KNEE K1 TOT HEMI STD CEM IMPL CAPPED SYNTHES: Type: IMPLANTABLE DEVICE | Site: KNEE | Status: FUNCTIONAL

## 2021-10-14 DEVICE — COMPONENT FEM SZ 6 R KNEE NAR POST STBL CEM ATTUNE: Type: IMPLANTABLE DEVICE | Site: KNEE | Status: FUNCTIONAL

## 2021-10-14 RX ORDER — MIDAZOLAM HYDROCHLORIDE 1 MG/ML
INJECTION, SOLUTION INTRAMUSCULAR; INTRAVENOUS
Status: SHIPPED | OUTPATIENT
Start: 2021-10-14 | End: 2021-10-14

## 2021-10-14 RX ORDER — DEXAMETHASONE SODIUM PHOSPHATE 4 MG/ML
INJECTION, SOLUTION INTRA-ARTICULAR; INTRALESIONAL; INTRAMUSCULAR; INTRAVENOUS; SOFT TISSUE
Status: SHIPPED | OUTPATIENT
Start: 2021-10-14 | End: 2021-10-14

## 2021-10-14 RX ORDER — OXYCODONE AND ACETAMINOPHEN 5; 325 MG/1; MG/1
2 TABLET ORAL
Status: DISCONTINUED | OUTPATIENT
Start: 2021-10-14 | End: 2021-10-14 | Stop reason: HOSPADM

## 2021-10-14 RX ORDER — DIPHENHYDRAMINE HYDROCHLORIDE 50 MG/ML
12.5 INJECTION, SOLUTION INTRAMUSCULAR; INTRAVENOUS
Status: CANCELLED | OUTPATIENT
Start: 2021-10-14

## 2021-10-14 RX ORDER — PROPOFOL 10 MG/ML
INJECTION, EMULSION INTRAVENOUS
Status: DISCONTINUED | OUTPATIENT
Start: 2021-10-14 | End: 2021-10-14 | Stop reason: HOSPADM

## 2021-10-14 RX ORDER — ONDANSETRON 2 MG/ML
4 INJECTION INTRAMUSCULAR; INTRAVENOUS
Status: DISCONTINUED | OUTPATIENT
Start: 2021-10-14 | End: 2021-10-14 | Stop reason: HOSPADM

## 2021-10-14 RX ORDER — SODIUM CHLORIDE 0.9 % (FLUSH) 0.9 %
5-40 SYRINGE (ML) INJECTION AS NEEDED
Status: DISCONTINUED | OUTPATIENT
Start: 2021-10-14 | End: 2021-10-14 | Stop reason: HOSPADM

## 2021-10-14 RX ORDER — CLINDAMYCIN PHOSPHATE 900 MG/50ML
900 INJECTION INTRAVENOUS EVERY 8 HOURS
Status: CANCELLED | OUTPATIENT
Start: 2021-10-14 | End: 2021-10-15

## 2021-10-14 RX ORDER — OXYCODONE AND ACETAMINOPHEN 10; 325 MG/1; MG/1
1 TABLET ORAL
Status: DISCONTINUED | OUTPATIENT
Start: 2021-10-14 | End: 2021-10-14 | Stop reason: HOSPADM

## 2021-10-14 RX ORDER — SODIUM CHLORIDE 0.9 % (FLUSH) 0.9 %
5-40 SYRINGE (ML) INJECTION AS NEEDED
Status: CANCELLED | OUTPATIENT
Start: 2021-10-14

## 2021-10-14 RX ORDER — ACETAMINOPHEN 325 MG/1
650 TABLET ORAL
Status: CANCELLED | OUTPATIENT
Start: 2021-10-14

## 2021-10-14 RX ORDER — INSULIN LISPRO 100 [IU]/ML
INJECTION, SOLUTION INTRAVENOUS; SUBCUTANEOUS ONCE
Status: DISCONTINUED | OUTPATIENT
Start: 2021-10-14 | End: 2021-10-14 | Stop reason: HOSPADM

## 2021-10-14 RX ORDER — SODIUM CHLORIDE 0.9 % (FLUSH) 0.9 %
5-40 SYRINGE (ML) INJECTION EVERY 8 HOURS
Status: DISCONTINUED | OUTPATIENT
Start: 2021-10-14 | End: 2021-10-14 | Stop reason: HOSPADM

## 2021-10-14 RX ORDER — SENNOSIDES 8.6 MG/1
1 TABLET ORAL 2 TIMES DAILY
Status: CANCELLED | OUTPATIENT
Start: 2021-10-14

## 2021-10-14 RX ORDER — SODIUM CHLORIDE, SODIUM LACTATE, POTASSIUM CHLORIDE, CALCIUM CHLORIDE 600; 310; 30; 20 MG/100ML; MG/100ML; MG/100ML; MG/100ML
25 INJECTION, SOLUTION INTRAVENOUS CONTINUOUS
Status: DISCONTINUED | OUTPATIENT
Start: 2021-10-14 | End: 2021-10-14 | Stop reason: HOSPADM

## 2021-10-14 RX ORDER — FACIAL-BODY WIPES
10 EACH TOPICAL DAILY PRN
Status: CANCELLED | OUTPATIENT
Start: 2021-10-14

## 2021-10-14 RX ORDER — CLINDAMYCIN PHOSPHATE 900 MG/50ML
900 INJECTION INTRAVENOUS ONCE
Status: COMPLETED | OUTPATIENT
Start: 2021-10-14 | End: 2021-10-14

## 2021-10-14 RX ORDER — LIDOCAINE HYDROCHLORIDE 10 MG/ML
INJECTION, SOLUTION EPIDURAL; INFILTRATION; INTRACAUDAL; PERINEURAL
Status: SHIPPED | OUTPATIENT
Start: 2021-10-14 | End: 2021-10-14

## 2021-10-14 RX ORDER — SODIUM CHLORIDE, SODIUM LACTATE, POTASSIUM CHLORIDE, CALCIUM CHLORIDE 600; 310; 30; 20 MG/100ML; MG/100ML; MG/100ML; MG/100ML
INJECTION, SOLUTION INTRAVENOUS
Status: DISCONTINUED | OUTPATIENT
Start: 2021-10-14 | End: 2021-10-14 | Stop reason: HOSPADM

## 2021-10-14 RX ORDER — SODIUM CHLORIDE 0.9 % (FLUSH) 0.9 %
5-40 SYRINGE (ML) INJECTION EVERY 8 HOURS
Status: CANCELLED | OUTPATIENT
Start: 2021-10-14

## 2021-10-14 RX ORDER — NALOXONE HYDROCHLORIDE 0.4 MG/ML
0.4 INJECTION, SOLUTION INTRAMUSCULAR; INTRAVENOUS; SUBCUTANEOUS AS NEEDED
Status: CANCELLED | OUTPATIENT
Start: 2021-10-14

## 2021-10-14 RX ORDER — TRANEXAMIC ACID 100 MG/ML
INJECTION, SOLUTION INTRAVENOUS AS NEEDED
Status: DISCONTINUED | OUTPATIENT
Start: 2021-10-14 | End: 2021-10-14 | Stop reason: HOSPADM

## 2021-10-14 RX ORDER — GABAPENTIN 300 MG/1
300 CAPSULE ORAL ONCE
Status: COMPLETED | OUTPATIENT
Start: 2021-10-14 | End: 2021-10-14

## 2021-10-14 RX ORDER — BUPIVACAINE HYDROCHLORIDE 5 MG/ML
INJECTION, SOLUTION EPIDURAL; INTRACAUDAL
Status: SHIPPED | OUTPATIENT
Start: 2021-10-14 | End: 2021-10-14

## 2021-10-14 RX ORDER — BUPIVACAINE HYDROCHLORIDE 2.5 MG/ML
INJECTION, SOLUTION EPIDURAL; INFILTRATION; INTRACAUDAL AS NEEDED
Status: DISCONTINUED | OUTPATIENT
Start: 2021-10-14 | End: 2021-10-14 | Stop reason: HOSPADM

## 2021-10-14 RX ORDER — BUPIVACAINE HYDROCHLORIDE 7.5 MG/ML
INJECTION, SOLUTION INTRASPINAL
Status: SHIPPED | OUTPATIENT
Start: 2021-10-14 | End: 2021-10-14

## 2021-10-14 RX ORDER — ACETAMINOPHEN 500 MG
1000 TABLET ORAL ONCE
Status: COMPLETED | OUTPATIENT
Start: 2021-10-14 | End: 2021-10-14

## 2021-10-14 RX ADMIN — ACETAMINOPHEN 1000 MG: 500 TABLET ORAL at 07:02

## 2021-10-14 RX ADMIN — PROPOFOL 25 MCG/KG/MIN: 10 INJECTION, EMULSION INTRAVENOUS at 08:23

## 2021-10-14 RX ADMIN — TRANEXAMIC ACID 1 G: 1 INJECTION, SOLUTION INTRAVENOUS at 08:23

## 2021-10-14 RX ADMIN — MIDAZOLAM 4 MG: 1 INJECTION INTRAMUSCULAR; INTRAVENOUS at 07:45

## 2021-10-14 RX ADMIN — PROPOFOL 50 MCG/KG/MIN: 10 INJECTION, EMULSION INTRAVENOUS at 09:24

## 2021-10-14 RX ADMIN — DEXAMETHASONE SODIUM PHOSPHATE 5 MG: 4 INJECTION, SOLUTION INTRAMUSCULAR; INTRAVENOUS at 07:45

## 2021-10-14 RX ADMIN — CLINDAMYCIN IN 5 PERCENT DEXTROSE 900 MG: 18 INJECTION, SOLUTION INTRAVENOUS at 08:23

## 2021-10-14 RX ADMIN — SODIUM CHLORIDE, POTASSIUM CHLORIDE, SODIUM LACTATE AND CALCIUM CHLORIDE 25 ML/HR: 600; 310; 30; 20 INJECTION, SOLUTION INTRAVENOUS at 07:02

## 2021-10-14 RX ADMIN — BUPIVACAINE HYDROCHLORIDE 25 ML: 5 INJECTION, SOLUTION EPIDURAL; INTRACAUDAL; PERINEURAL at 07:45

## 2021-10-14 RX ADMIN — GABAPENTIN 300 MG: 300 CAPSULE ORAL at 07:02

## 2021-10-14 RX ADMIN — OXYCODONE AND ACETAMINOPHEN 1 TABLET: 325; 10 TABLET ORAL at 12:02

## 2021-10-14 RX ADMIN — BUPIVACAINE HYDROCHLORIDE IN DEXTROSE 15 MG: 7.5 INJECTION, SOLUTION SUBARACHNOID at 08:20

## 2021-10-14 RX ADMIN — TRANEXAMIC ACID 1 G: 1 INJECTION, SOLUTION INTRAVENOUS at 09:13

## 2021-10-14 RX ADMIN — LIDOCAINE HYDROCHLORIDE 50 MG: 10 INJECTION, SOLUTION EPIDURAL; INFILTRATION; INTRACAUDAL; PERINEURAL at 08:20

## 2021-10-14 RX ADMIN — SODIUM CHLORIDE, POTASSIUM CHLORIDE, SODIUM LACTATE AND CALCIUM CHLORIDE: 600; 310; 30; 20 INJECTION, SOLUTION INTRAVENOUS at 08:09

## 2021-10-14 NOTE — ANESTHESIA PROCEDURE NOTES
Peripheral Block    Start time: 10/14/2021 7:43 AM  End time: 10/14/2021 7:45 AM  Performed by: Jovi Thompson CRNA  Authorized by: Jovi Thompson CRNA       Pre-procedure: Indications: at surgeon's request and post-op pain management    Preanesthetic Checklist: patient identified, risks and benefits discussed, site marked, timeout performed, anesthesia consent given and patient being monitored    Timeout Time: 07:43 EDT          Block Type:   Block Type:   Adductor canal block  Laterality:  Right  Monitoring:  Standard ASA monitoring, continuous pulse ox, frequent vital sign checks, heart rate, oxygen and responsive to questions  Injection Technique:  Single shot  Procedures: ultrasound guided    Patient Position: supine  Prep: chlorhexidine    Location:  Mid thigh  Needle Type:  Ultraplex  Needle Gauge:  20 G  Needle Localization:  Ultrasound guidance  Medication Injected:  Midazolam (VERSED) injection, 4 mg  bupivacaine (PF) (MARCAINE) 0.5% injection, 25 mL  dexamethasone (DECADRON) 4 mg/mL injection, 5 mg  Med Admin Time: 10/14/2021 7:45 AM    Assessment:  Number of attempts:  1  Injection Assessment:  Incremental injection every 5 mL, local visualized surrounding nerve on ultrasound, negative aspiration for blood, no intravascular symptoms and no paresthesia  Patient tolerance:  Patient tolerated the procedure well with no immediate complications

## 2021-10-14 NOTE — ANESTHESIA POSTPROCEDURE EVALUATION
Procedure(s):  RIGHT TKA. general - backup, regional, spinal    Anesthesia Post Evaluation      Multimodal analgesia: multimodal analgesia used between 6 hours prior to anesthesia start to PACU discharge  Patient location during evaluation: bedside  Patient participation: complete - patient participated  Level of consciousness: awake and alert  Pain score: 0  Pain management: adequate  Airway patency: patent  Anesthetic complications: no  Cardiovascular status: acceptable and stable  Respiratory status: acceptable and room air  Hydration status: acceptable  Comments: Ok to discharge when post op criteria met.    Post anesthesia nausea and vomiting:  none  Final Post Anesthesia Temperature Assessment:  Normothermia (36.0-37.5 degrees C)      INITIAL Post-op Vital signs:   Vitals Value Taken Time   /69 10/14/21 1004   Temp     Pulse 69 10/14/21 1004   Resp 12 10/14/21 1004   SpO2 98 % 10/14/21 1004

## 2021-10-14 NOTE — INTERVAL H&P NOTE
Update History & Physical    The Patient's History and Physical was reviewed with the patient. There was no change. The surgical site was confirmed by the patient and me. Patient understands and wants to proceed with the procedure. If applicable, I have discussed with the patient / power of  the rationale for blood component transfusion; its benefits in treating or preventing fatigue, organ damage, or death; and its risk which includes mild transfusion reactions, rare risk of blood borne infection, or more serious but rare reactions. I have discussed the alternatives to transfusion, including the risk and consequences of not receiving transfusion. The patient / Asa Fallow of  had an opportunity to ask questions and had agreed to proceed with transfusion of blood components. Plan:  The risk, benefits, expected outcome, and alternative to the recommended procedure have been discussed with the patient.       Electronically signed by GISSEL Liu on 10/14/2021 at 7:18 AM

## 2021-10-14 NOTE — ANESTHESIA PROCEDURE NOTES
Spinal Block    Start time: 10/14/2021 8:09 AM  End time: 10/14/2021 8:20 AM  Performed by: Natalee Jerry CRNA  Authorized by: Natalee Jerry CRNA     Pre-procedure:   Indications: at surgeon's request and primary anesthetic  Preanesthetic Checklist: patient identified, risks and benefits discussed, anesthesia consent, site marked, patient being monitored and timeout performed    Timeout Time: 08:09 EDT          Spinal Block:   Patient Position:  Seated  Prep Region:  Lumbar  Prep: Betadine      Location:  L2-3  Technique:  Single shot    Local Dose (mL):  5    Needle:   Needle Type:  Pencan  Needle Gauge:  25 G  Attempts:  2      Events: CSF confirmed, no blood with aspiration and no paresthesia        Assessment:  Insertion:  Uncomplicated  Patient tolerance:  Patient tolerated the procedure well with no immediate complications

## 2021-10-14 NOTE — PERIOP NOTES
[place] with PT in to work with pt, teach home exercises, and ambulate pt in hallway. Pt stable, VSS. Will continue to monitor.

## 2021-10-14 NOTE — PROGRESS NOTES
Problem: Mobility Impaired (Adult and Pediatric)  Goal: *Acute Goals and Plan of Care (Insert Text)  Description: Pt is MOD (I)  with gait and navigates stairs with MOD (I) . PLOF: Community ambulator who did not use an AD and who was (I) with ADLs. Outcome: Resolved/Met     Problem: Patient Education: Go to Patient Education Activity  Goal: Patient/Family Education  Outcome: Resolved/Met   PHYSICAL THERAPY EVALUATION AND DISCHARGE    Patient: Mallory Messina (33 y.o. female)  Date: 10/14/2021  Primary Diagnosis: Osteoarthritis of right knee, unspecified osteoarthritis type [M17.11]  Knee osteoarthritis [M17.10]  Procedure(s) (LRB):  RIGHT TKA (Right) Day of Surgery   Precautions:  WBAT    ASSESSMENT :  Based on the objective data described below, the patient presents s/p R TKA and she is WBAT. She is able to ambulate with a RW with MOD (I) and she is able to navigate stairs with MOD (I). She is educated on a HEP and tolerates well. She can return home with outpatient P.T. Patient does not require further skilled intervention at this level of care. PLAN :  Recommendations and Planned Interventions:   No formal PT needs identified at this time. Discharge Recommendations: Outpatient  Further Equipment Recommendations for Discharge: N/A     SUBJECTIVE:   Patient states I need to use the bathroom.     OBJECTIVE DATA SUMMARY:     Past Medical History:   Diagnosis Date    BRCA1 positive 2012    Breast cancer ([facility] 2013    right breast    Breast cancer ([facility]     Diabetes ([facility] [facility]     Hypercholesterolemia     Hypertension     Insomnia     Menopause 2012    chemo put her in menopause    Obesity (BMI 30.0-34.9)     UTI (urinary tract infection)      Past Surgical History:   Procedure Laterality Date    HX CARPAL TUNNEL RELEASE Bilateral     HX  SECTION      HX CHOLECYSTECTOMY      HX HYSTERECTOMY  2009    partial    HX KNEE ARTHROSCOPY Bilateral     HX MASTECTOMY Right 2013    HX TUBAL LIGATION Barriers to Learning/Limitations: None  Compensate with: N/A  Home Situation:   Home Situation  Home Environment: Private residence  # Steps to Enter: 2  Rails to Enter: No  One/Two Story Residence: One story  Living Alone: No  Support Systems: Spouse/Significant Other  Patient Expects to be Discharged to[de-identified] House  Current DME Used/Available at Home: Walker, rollator  Critical Behavior:  Neurologic State: Alert  Orientation Level: Oriented X4  Skin Integrity: Incision (comment) (R KNEE)  Skin Integumentary  Skin Integrity: Incision (comment) (R KNEE)  Strength:    Strength: Generally decreased, functional (R knee 4/5, SLR 1130, 3.25 hours after spinal)  Tone & Sensation:   Sensation: Impaired (Pelvic area still numb)  Range Of Motion:   AROM: Generally decreased, functional (R knee 7-58)  PROM: Generally decreased, functional (R knee 5-80)  Posture:  Posture (WDL): Within defined limits  Functional Mobility:  Bed Mobility:  Rolling: Modified independent  Supine to Sit: Modified independent  Sit to Supine: Modified independent  Scooting: Modified independent  Transfers:  Sit to Stand: Modified independent  Stand to Sit: Modified independent  Balance:   Sitting: Intact  Standing: Intact  Ambulation/Gait Training:  Distance (ft): 250 Feet (ft) (and another 25')  Assistive Device: Walker, rolling  Ambulation - Level of Assistance: Modified independent  Gait Description (WDL): Exceptions to WDL  Gait Abnormalities: Path deviations  Right Side Weight Bearing: As tolerated  Stairs:  Number of Stairs Trained: 5 (non-reciprocating)  Stairs - Level of Assistance: Modified independent  Rail Use: Both  Today's TX:   Pt is able to ambulate with MOD(I)  with a RW. She is able to navigate stairs with MOD (I). She is educated on a HEP and states understanding. Pain:  Pain level pre-treatment: 6/10   Pain level post-treatment: 3/10  Pain Location: R knee  Pain Intervention(s): Medication (see MAR);  Rest, Ice, Repositioning Response to intervention: Nurse notified, See doc flow    Activity Tolerance:   Good  Please refer to the flowsheet for vital signs taken during this treatment. After treatment:   []         Patient left in no apparent distress sitting up in chair  [x]         Patient left in no apparent distress in bed  []         Call bell left within reach  [x]         Nursing notified  []         Caregiver present  []         Bed alarm activated  []         SCDs applied    COMMUNICATION/EDUCATION:   [x]         Role of Physical Therapy in the acute care setting. [x]         Fall prevention education was provided and the patient/caregiver indicated understanding. [x]         Patient/family have participated as able in goal setting and plan of care. [x]         Patient/family agree to work toward stated goals and plan of care. []         Patient understands intent and goals of therapy, but is neutral about his/her participation. []         Patient is unable to participate in goal setting/plan of care: ongoing with therapy staff.  []         Other:     Thank you for this referral.  Angy Mack, PT, DPT   Time Calculation: 35 mins

## 2021-10-14 NOTE — DISCHARGE INSTRUCTIONS
TOTAL KNEE REPLACEMENT DISCHARGE INFORMATION    You have undergone a Total Knee Replacement. The following list is to provide you with some expectations over the next week upon your discharge from the hospital.     1. Please begin Eliquis every 12 hours (twice daily) starting tomorrow as directed until Dr. Taj Duncan instructs you to discontinue it. If you are not sure which blood thinner to take please contact Dr. Sandro Haro office next business day for clarification. 2. Please be sure to continue your thigh-high compression stockings on both sides until instructed to discontinue them. 3. Over the course of the next week, you should continue thigh high stockings on the operative leg, DO NOT GET THE INCISION WET until instructed to do so. Please make sure the stockings on the operative leg are pulled up all the way to the thigh to prevent any creases which may result in abrasions or creases in the skin. If the stockings are creating creases resulting in abrasions or blistering on the operative leg please remove the stockings. You may take the stockings off on the nonoperative leg once you arrive home. 4. You may notice some bruising on your thigh and it may extend all the way to the ankles. That is perfectly normal early on.  5. You may experience a clicking noise in your knee and that is normal because of the artificial knee. 6. It is important to remember if you have any surgical procedure including dental procedures which may result in bleeding that an antibiotic 1 hour before the procedure will be required. Please let the provider performing the procedure know that you have artificial joint. If an antibiotic is not given by them please call our office and give us at least 5 business days to get you the appropriate antibiotics if needed. This rule applies indefinitely. 7. If an Ace wrap is placed on your knee you may remove the Ace wrap only 48 hours after your surgery.   We will leave the stockings on.  8. During the course of your  over the next week, should you experience fevers of 101.5 F, a white drainage from the incision, extreme redness around the incision, or the incision begins to have a pungent smell; Please call our office or page Dr. Taj Duncan whose numbers are provided in your discharge paperwork. To Page Dr. Taj Duncan please call [phone] and dial 0. Have the  page whomever is on call for Orthopedics. These are signs of infection and it should be addressed immediately. 9. Please do not drive until instructed to do so. 10. If you need a refill on pain medication please allow at least 2 business days notice for any refills. Immediate refill request may not be possible. Medication refill requests will not be addressed during non-business hours. Please do not page the on-call provider for pain medication refills after hours. 11. It is very important for you to begin your Outpatient Physical Therapy within a couple days of the day of your discharge and your appointment should have been set up. If your physical therapy has not been set up please call our office the next business day for assistance. Details provided in a separate sheet. 12. Remove ace wrap in 48 hrs after surgery but keep stockings on. 15. Finish all antibiotics, start the antibiotics as soon as you go home if you have prescribed antibiotics. 14.  You should perform your daily home exercises at least 4 times a day 30 minutes each time. Perform foot pumps on both feet at least 10 times every 15 minutes while awake. This helps prevent swelling in the leg and can help prevent blood clots in the leg. On the operative leg if you have significant swelling you can also lay down flat and put 3 pillows under the heel so the heel is above the heart level and then perform foot pumps 4 times a day for 10 minutes to help bring the swelling down. 15.  Do not place anything under your knee while sleeping at night.   Elevate your heel so your  is straight while sleeping at night. 16.  Perform deep breathing exercises 10 times every hour while awake. 17.  If you had a nerve block and you are not having pain the day of the surgery, at nighttime it is okay to take 1 pain medication before going to sleep to help prevent excruciating pain when the nerve block wears off. 18.  You may be given an ice pack machine use that to help prevent swelling. Do not apply heat to the incision area. 19.  While you are awake at least 10 times every 30 minutes move your foot up and down as if you are pumping gas from both feet to help prevent swelling and to promote blood circulation in the calf. 20.  If you develop sudden onset of shortness of breath or severe calf pain please go to closest emergency room. 21. Your pain medicine is a Narcotic and may cause constipation. You may take an over the counter stool softener while taking pain medicine. ICE THERAPY WRAP:    · Keep ice therapy wrap on when resting. · DO not wear when moving or walking. · Ice packs are reusable. · Ice Therapy wrap holds two ice packs at a time. Things to watch for:             Increased swelling of the surgical site             Spreading of redness around the incision site             Drainage of pus from the incision site             Developing a fever of 101.5 °F or higher             If any of these symptoms occur you have any questions please contact our office at [phone]. If you need to talk to Dr. Ji Rey on an urgent basis please call the hospital at [phone]. 0 for the . Please let the  know you are a surgical patient of Dr. Ji Rey and you wish to get in contact with him. If Dr. Ji Rey or his staff do not call you back within 30 minutes. Please tell the  to try again. Phone: [phone]  [email or web address]

## 2021-10-14 NOTE — OP NOTES
Operative Note    Patient: Abdifatah Brower MRN: [id number]  Surgery Date: 10/14/2021  [unfilled]          Procedure  Primary Surgeon    RIGHT TKA  Liliana Isaacs MD    * Panel 2 does not exist *  * Panel 2 does not exist *    * Panel 3 does not exist *  * Panel 3 does not exist *     Surgeon(s) and Role:     * Liliana Isaacs MD - Primary    Other OR Staff/Assistants:  Circ-1: Babatunde Rivers RN  Scrub Tech-1: Samuel Barnard RN-1: Thelma Lutz  Surg Asst-1: Piotr Upton    1st Assistant Tasks:  Closing    Pre-operative Diagnosis:  Osteoarthritis of right knee, unspecified osteoarthritis type [M17.11]    Post-operative Diagnosis: same as preop diagnosis    Anesthesia Type: Spinal     Findings: djd    Complications: No    EBL: 50 cc    Specimens: None    Implants     Implant    Impl Sys Biocomposite Achilles -- [employer] - [id number] - Implanted   (Left) Achilles Tendon    Inventory item: IMPL SYS BIOCOMPOSITE ACHILLES -- SPEEDBRIDGE Model/Cat number: [id number]    : [employer] Lot number: [id number]    As of 1/24/2020     Status: Implanted                  Cement Bne 40gm Full Dose Pmma W/ Gent Hi Visc Radpq Lng - [id number] - Implanted   (Right) Knee    Inventory item: CEMENT BNE 40GM FULL DOSE PMMA W/ GENT HI VISC RADPQ LNG Model/Cat number: [id number]    : [employer] [employer] Lot number: [id number]    As of 10/14/2021     Status: Implanted                  Baseplate Tib Sz 5 Knee Rot Platfrm Gil Sys Attune - [id number] - Implanted   (Right) Knee    Inventory item: BASEPLATE TIB SZ 5 KNEE ROT PLATFRM GIL SYS ATTUNE Model/Cat number: [id number]    : [employer] [employer] Lot number: [id number]    As of 10/14/2021     Status: Implanted                  Insert Tib Sz 6 Thk5mm Knee Post Stbl Rot Platfrm Attune - [id number] - Implanted   (Right) Knee    Inventory item: INSERT TIB SZ 6 THK5MM KNEE POST STBL ROT PLATFRM ATTUNE Model/Cat number: [id number]    : [employer] Lot number: [id number]    As of 10/14/2021     Status: Implanted                  Component Fem Sz 6 R Knee Mlalika Post Stbl Martita Attune - [id number] - Implanted   (Right) Knee    Inventory item: COMPONENT FEM SZ 6 R KNEE MALLIKA POST STBL MATRITA ATTUNE Model/Cat number: [id number]    : [employer] Lot number: [id number]    As of 10/14/2021     Status: Implanted                  Component Pat Kjp14nu Knee Poly Dome Martita Medialized Attune - [id number] - Implanted   (Right) Knee    Inventory item: COMPONENT PAT [id number] KNEE POLY DOME MARTITA MEDIALIZED ATTUNE Model/Cat number: [id number]    : "[employer] Lot number: [id number]    As of 10/14/2021     Status: Implanted                         OPERATIVE PROCEDURE:  Please note the first assistant role was to help in patient positioning and draping of the extremity in a sterile fashion. Also during the surgery the assistant's responsibilities included but not limited to extremity positioning during critical portions of the surgery. Assisting in using and placement of retractors during surgery. Lower extremity was prepped and draped in a sterile fashion. After adequate anesthesia was given, the patient was placed in a well-padded supine position. Subvastus arthrotomy from the tibial tubercle to the superior pole of the patella was made. Knee was hyperflexed. Intramedullary reaming of distal femur and proximal tibia was performed. 10 mm of distal femur was cut. Anterior-posterior sizing guide was used. Anterior, posterior, chamfer cuts, and box cuts were made next. Proximal tibial cut and preparation performed. Posterior osteophyte meniscal remnants were removed, and also patella was everted. Free-hand cut of the patella was made. Trial components were placed. The patient was found to have excellent range of motion and stability with all trial components.   All the trial components removed. Copious irrigation performed. Distal femur, proximal tibia, and patella were impacted in place. Excessive cement was removed. After the cement was hard, Subvastus arthrotomy closed with Vicryl and Prineo stitch. Compressive dressing was applied. The patient was taken to PACU in stable condition. Please note due to the patient's BMI of greater than 30 significant surgical effort was required compared to the standard patient with a BMI lower than 30. Surgical time increased approximately 30% from the normal surgical time due to the patient's high BMI. Because of the high BMI patient's knee would be considered a complex total knee replacement rather than a standard total knee replacement.       Mikie Dunbar MD

## 2021-10-14 NOTE — PERIOP NOTES
Pt sitting on side of bed dressed. Pt and  given discharge and ice wrap therapy instructions with verbalized understanding, opportunity for questions given. No questions voiced.

## 2021-10-15 ENCOUNTER — HOSPITAL ENCOUNTER (OUTPATIENT)
Dept: PHYSICAL THERAPY | Age: 56
Discharge: HOME | End: 2021-10-15
Payer: COMMERCIAL

## 2021-10-15 ENCOUNTER — APPOINTMENT (OUTPATIENT)
Dept: PHYSICAL THERAPY | Age: 56
End: 2021-10-15
Payer: COMMERCIAL

## 2021-10-15 PROCEDURE — 97530 THERAPEUTIC ACTIVITIES: CPT

## 2021-10-15 PROCEDURE — 97162 PT EVAL MOD COMPLEX 30 MIN: CPT

## 2021-10-15 NOTE — PROGRESS NOTES
PT KNEE EVAL AND DAILY NOTE 10-18    Patient Name: Blair Reid  Date:10/15/2021  : 1965  [x]  Patient  Verified  Payor: Jordon Parish / Plan: [employer] HMO/CHOICE PLUS/POS / Product Type: HMO /    In time: 7580  Out time: 5118  Total Treatment Time (min): 50  Visit #: 1    Medicare/BCBS Only   Total Timed Codes (min):  20 1:1 Treatment Time:  50     Treatment Area: Pain in right knee [M25.561]    SUBJECTIVE  Pain Level (0-10 scale):  Current 8/10; Worst 9/10; Best 3/10   []Sharp  [x]Dull  [x]Achy []Burning []Throbbing []N&T []Other:  [x]constant []intermittent []improving []worsening []no change since onset    Any medication changes, allergies to medications, adverse drug reactions, diagnosis change, or new procedure performed?: [x] No    [] Yes (see summary sheet for update)  Subjective:       Pt is a 65 y/o female who presents to physical therapy s/p R TKA on 10/14/21. Pt received in waiting area ambulating with 2WW. Pt reports increased pain and stiffness in R knee since surgery. Pt has been performing HEP. Prior to surgery pt was Ind with functional mobility. Pt presents today with decreased ROM, decreased strength, increased pain, and decreased ability to ambulate with normalized gait pattern. Pt could benefit from skilled PT services to address the above impairments and to improve Pt ability to participate in functional activities of choice.      Date of Surgery: 10/14/21  Weight bearing status: WBAT    Patient Goals: Reduce pain, restore ambulation ability  Previous Treatment/Compliance: NA  Barriers: []pain []financial []time []transportation []other  Motivation: Good  Substance use: []Alcohol []Tobacco []other:   LEFS Score: 17.7  FOTO: 34 / 44 adjusted      OBJECTIVE/EXAMINATION  Posture: [] Varus    [] Valgus    [] Recurvatum        [] Tibial Torsion    [] Foot Supination    [] Foot Pronation    Describe:    Gait:  [] Normal    [] Abnormal    [x] Antalgic    [] NWB Device:    Describe: decreased step length, leslee, and gait speed  ROM / Strength  [] Unable to assess                  AROM                      PROM                   Strength (1-5)    Left Right Left Right Left Right   Hip Flexion     4+/5 4/5    Extension     4+/5 4/5    Abduction     4+/5 4/5    Adduction     4+/5 4/5   Knee Flexion WFL 60 WFL 80 4+/5 2-/5    Extension WFL -13 WFL -11 4+/5 2-/5   Ankle Plantarflexion     5/5 5/5    Dorsiflexion     5/5 5/5       Flexibility: [] Unable to assess at this time  Hamstrings:    (L) Tightness= [] WNL   [] Min   [] Mod   [] Severe    (R) Tightness= [] WNL   [] Min   [] Mod   [] Severe  Quadriceps:    (L) Tightness= [] WNL   [] Min   [] Mod   [] Severe    (R) Tightness= [] WNL   [] Min   [] Mod   [] Severe  Gastroc:      (L) Tightness= [] WNL   [] Min   [] Mod   [] Severe    (R) Tightness= [] WNL   [] Min   [] Mod   [] Severe  Other:    Palpation:  TTP R quadriceps, medial knee at pes anserine, patellar tendon    Optional Tests:  Patellar Positioning (Static)   []L []R Normal []L []R Lateral   []L []R Elfida Freeze      []L []R Medial   []L []R SOJOURN AT [place]    Patellar Tracking   []L []R Glide (Lat)   []L []R Tilt (Lat)     []L []R Glide (Med)  []L []R Tilt (Med)      []L []R Tile (Inf)     Patellar Mobility   []L []R Hypermobile []L []R Hypomobile         Girth Measurements:   Mid patellar (cm) 10 cm proximal to superior border of patella (cm) 10 cm inferior to inferior pole of patella (cm)            Special testing:  Lachmans  [] Neg    [] Pos Posterior Drawer [] Neg    [] Pos  Pivot Shift  [] Neg    [] Pos Posterior Sag  [] Neg    [] Pos  MEHNAZ   [] Neg    [] Pos Ellen's Test [] Neg    [] Pos  ALRI   [] Neg    [] Pos Squat   [] Neg    [] Pos  Valgus@ 0 Degrees [] Neg    [] Pos Sammy [] Neg    [] Pos  Valgus@ 30 Degrees [] Neg    [] Pos Patellar Apprehension [] Neg    [] Pos  Varus@ 0 Degrees [] Neg    [] Pos Sampson's Compression [] Neg    [] Pos  Varus@ 30 Degrees [] Neg    [] Pos Ely's Test  [] Neg    [] Pos  Apley's Compression [] Neg    [] Pos Cristian's Test  [] Neg    [] Pos  Apley's Distraction [] Neg    [] Pos Stroke Test  [] Neg    [] Pos   Anterior Drawer [] Neg    [] Pos Fluctuation Test [] Neg    [] Pos  Other:                  [] Neg    [] Pos                 Other tests/comments:  Mobility: Ind  Self Care: Ind        Modality rationale:     Min Type Additional Details    [] Estim:  []Unatt       []IFC  []Premod                        []Other:  []w/ice   []w/heat  Position:  Location:    [] Estim: []Att    []TENS instruct  []NMES                    []Other:  []w/US   []w/ice   []w/heat  Position:  Location:    []  Traction: [] Cervical       []Lumbar                       [] Prone          []Supine                       []Intermittent   []Continuous Lbs:  [] before manual  [] after manual    []  Ultrasound: []Continuous   [] Pulsed                           []1MHz   []3MHz Location:  W/cm2:    []  Iontophoresis with dexamethasone         Location: [] Take home patch   [] In clinic    []  Ice     []  heat  []  Ice massage  []  Laser   []  Anodyne Position:  Location:    []  Laser with stim  []  Other: Position:  Location:    []  Vasopneumatic Device Pressure:       [] lo [] med [] hi   Temperature: [] lo [] med [] hi   [] Skin assessment post-treatment:  []intact []redness- no adverse reaction    []redness  adverse reaction:     30 min [x]Eval                  []Re-Eval         min Therapeutic Exercise:  [] See flow sheet :        20 min Therapeutic Activity:  [x]  See flow sheet :   Rationale: increase ROM, increase strength and improve coordination  to improve the patients ability to ambulate and traverse stairs. min Neuromuscular Re-education:  []  See flow sheet :          min Manual Therapy:            min Gait Training:  ___ feet with ___ device on level surfaces with ___ level of assist   Rationale:           With   [] TE   [] TA   [] neuro   [] other: Patient Education: [x] Review HEP    [] Progressed/Changed HEP based on:   [] positioning   [] body mechanics   [] transfers   [] heat/ice application    [] other:        Pain Level (0-10 scale) post treatment: 8/10      Assessment:    [x]  See Plan of Care  []  See progress note/recertification  []  See Discharge Summary           Colonel Yuen PT, DPT  10/15/2021  2:58 PM

## 2021-10-18 ENCOUNTER — HOSPITAL ENCOUNTER (OUTPATIENT)
Dept: PHYSICAL THERAPY | Age: 56
Discharge: HOME | End: 2021-10-18
Payer: COMMERCIAL

## 2021-10-18 PROCEDURE — 97016 VASOPNEUMATIC DEVICE THERAPY: CPT

## 2021-10-18 PROCEDURE — 97110 THERAPEUTIC EXERCISES: CPT

## 2021-10-18 NOTE — PROGRESS NOTES
PT DAILY TREATMENT NOTE 8    Patient Name: Mary Montgomery  Date:10/18/2021  : 1965  [x]  Patient  Verified  Payor: Misty Andrew / Plan: [facility] HMO/CHOICE PLUS/POS / Product Type: HMO /    In time:1455  Out time:1609  Total Treatment Time (min): 74  Total Timed Codes (min): 54  1:1 Treatment Time (min): 54   Visit # 2      Treatment Area: Unilateral primary osteoarthritis, right knee [M17.11]    SUBJECTIVE  Pt enters today with rollator and states that she took 1/2 pain pill before arrival. Pt states is motivated to return to work this month.    Pain Level (0-10 scale): 8/10  Any medication changes, allergies to medications, adverse drug reactions, diagnosis change, or new procedure performed?: [x] No    [] Yes (see summary sheet for update)        OBJECTIVE  Modality rationale: decrease inflammation and decrease pain to improve the patients ability to move/heal optimally    Min Type Additional Details    [] Estim: []Att   []Unatt  []TENS instruct                 []IFC  []Premod []NMES                       []Other:  []w/US   []w/ice   []w/heat  Position:  Location:    []  Traction: [] Cervical       []Lumbar                       [] Prone          []Supine                       []Intermittent   []Continuous Lbs:  [] before manual  [] after manual    []  Ultrasound: []Continuous   [] Pulsed                           []1MHz   []3MHz Location:  W/cm2:        10 []  Ice     [x]  heat  []  Ice massage Position: sitting with LE propped  Location: right knee   10 [x]  Vasopneumatic Device Pressure: [x] lo [] med [] hi   Temp: [x] lo [] med [] hi   [x] Skin assessment post-treatment:  [x]intact []redness- no adverse reaction       []redness  adverse reaction:       54 min Therapeutic Exercise:  [x] See flow sheet :   Rationale: increase ROM, increase strength and improve balance to improve the patients ability to maximize pain-free daily activity, restore function & assist with return to work              With TE Patient Education: [x] Review HEP    [] Progressed/Changed HEP based on:   [] positioning   [] body mechanics   [] transfers   [] heat/ice application          Pain Level (0-10 scale) post treatment: 4/10    ASSESSMENT/Changes in Function: Session initiated with MHP followed by stepper for active warm up and to promote reciprocal motion. Plan of care initiated to promote ROM, strength, function. Pt provided with demonstration , verbal cues with all activities to promote proper posture and muscle engagement. Pt cued to promote initial heel contact and increase knee flexion during gait. Will initiate gait with cane next visit. Pt encouraged to perform HEP regularly. Patient will continue to benefit from skilled PT services to modify and progress therapeutic interventions, address functional mobility deficits, address ROM deficits, address strength deficits, analyze and address soft tissue restrictions, analyze and cue movement patterns, analyze and modify body mechanics/ergonomics and assess and modify postural abnormalities to attain remaining goals.      [x]  See Plan of Care  []  See progress note/recertification  []  See Discharge Summary             PLAN  [x]  Upgrade activities as tolerated     [x]  Continue plan of care  []  Update interventions per flow sheet       []  Discharge due to:_  []  Other:_      Pricilla Damon PT, DPT 10/18/2021  2:55 PM

## 2021-10-20 ENCOUNTER — HOSPITAL ENCOUNTER (OUTPATIENT)
Dept: PHYSICAL THERAPY | Age: 56
Discharge: HOME | End: 2021-10-20
Payer: COMMERCIAL

## 2021-10-20 PROCEDURE — 97016 VASOPNEUMATIC DEVICE THERAPY: CPT

## 2021-10-20 PROCEDURE — 97110 THERAPEUTIC EXERCISES: CPT

## 2021-10-20 NOTE — PROGRESS NOTES
PT DAILY TREATMENT NOTE 8    Patient Name: Ondina Buckley  Date:10/20/2021  : 1965  [x]  Patient  Verified  Payor: Tika Spivey / Plan: [facility] HMO/CHOICE PLUS/POS / Product Type: HMO /    In time:1600  Out time:1702  Total Treatment Time (min): 62  Total Timed Codes (min): 42  1:1 Treatment Time (min): 42   Visit #: 3     Treatment Area: Unilateral primary osteoarthritis, right knee [M17.11]    SUBJECTIVE  Pt enters today without AD reporting minor pain and stiffness in R knee. Pain Level (0-10 scale): 2    Any medication changes, allergies to medications, adverse drug reactions, diagnosis change, or new procedure performed?: [x] No    [] Yes (see summary sheet for update)    OBJECTIVE  Modality rationale: decrease edema, decrease inflammation, decrease pain and increase tissue extensibility to improve the patients ability to fully particpate in physical therapy.    Min Type Additional Details    [] Estim: []Att   []Unatt  []TENS instruct                 []IFC  []Premod []NMES                       []Other:  []w/US   []w/ice   []w/heat  Position:  Location:    []  Traction: [] Cervical       []Lumbar                       [] Prone          []Supine                       []Intermittent   []Continuous Lbs:  [] before manual  [] after manual    []  Ultrasound: []Continuous   [] Pulsed                           []1MHz   []3MHz Location:  W/cm2:   10 []  Ice     [x]  heat  []  Ice massage Position: seated with R LE propped  Location: R knee   10 [x]  Vasopneumatic Device Pressure: [] lo [] med [] hi   Temp: [] lo [] med [] hi   [] Skin assessment post-treatment:  []intact []redness- no adverse reaction       []redness  adverse reaction:     42 min Therapeutic Exercise:  [x] See flow sheet :   Rationale: increase ROM, increase strength, improve coordination and improve balance to improve the patients ability to restore function and mobility in R LE while remaining pain free.           With TE  TA   NR  GT   [facility] Patient Education: [x] Review HEP    [] Progressed/Changed HEP based on:   [] positioning   [] body mechanics   [] transfers   [] heat/ice application        Pain Level (0-10 scale) post treatment: 2    ASSESSMENT/Changes in Function: Session began with MHP followed the stepper to promote muscular endurance and knee mobility. Self stretches in parallel bars followed to patient's tolerance. Continued with exercises designed to promote R knee strength and range of motion in both seated and standing positions. Verbal cues given to obtain maximum quad contraction with quad sets and mini squats. Most difficulty was with knee extension secondary to pain. Vaso post rehab to combat pain and soreness post exercise. Patient will continue to benefit from skilled PT services to modify and progress therapeutic interventions, address functional mobility deficits, address ROM deficits, address strength deficits, analyze and address soft tissue restrictions, analyze and cue movement patterns, analyze and modify body mechanics/ergonomics, assess and modify postural abnormalities and address imbalance/dizziness to attain remaining goals.      [x]  See Plan of Care  []  See progress note/recertification  []  See Discharge Summary         PLAN  []  Upgrade activities as tolerated     [x]  Continue plan of care  []  Update interventions per flow sheet       []  Discharge due to:_  []  Other:_      TK Funez  10/20/2021  4:16 PM

## 2021-10-21 ENCOUNTER — OFFICE VISIT (OUTPATIENT)
Dept: ORTHOPEDIC SURGERY | Age: 56
End: 2021-10-21
Payer: COMMERCIAL

## 2021-10-21 ENCOUNTER — HOSPITAL ENCOUNTER (OUTPATIENT)
Dept: MAMMOGRAPHY | Age: 56
Discharge: HOME | End: 2021-10-21
Payer: COMMERCIAL

## 2021-10-21 DIAGNOSIS — M17.11 OSTEOARTHRITIS OF RIGHT KNEE, UNSPECIFIED OSTEOARTHRITIS TYPE: ICD-10-CM

## 2021-10-21 DIAGNOSIS — M25.561 RIGHT KNEE PAIN, UNSPECIFIED CHRONICITY: ICD-10-CM

## 2021-10-21 DIAGNOSIS — Z12.31 ENCOUNTER FOR SCREENING MAMMOGRAM FOR MALIGNANT NEOPLASM OF BREAST: ICD-10-CM

## 2021-10-21 DIAGNOSIS — Z96.651 STATUS POST RIGHT KNEE REPLACEMENT: Primary | ICD-10-CM

## 2021-10-21 PROCEDURE — 99024 POSTOP FOLLOW-UP VISIT: CPT | Performed by: NURSE PRACTITIONER

## 2021-10-21 PROCEDURE — 77067 SCR MAMMO BI INCL CAD: CPT

## 2021-10-21 RX ORDER — OXYCODONE AND ACETAMINOPHEN 5; 325 MG/1; MG/1
1 TABLET ORAL
Qty: 30 TABLET | Refills: 0 | Status: SHIPPED | OUTPATIENT
Start: 2021-10-21 | End: 2021-11-04

## 2021-10-21 RX ORDER — CLINDAMYCIN HYDROCHLORIDE 300 MG/1
CAPSULE ORAL
COMMUNITY
Start: 2021-10-08 | End: 2021-11-16

## 2021-10-21 NOTE — PROGRESS NOTES
[address] [facility] Alexa Sanford, [address] [facility], [address] [place] [address]  PLAN OF CARE / STATEMENT OF MEDICAL NECESSITY FOR PHYSICAL THERAPY SERVICES  Patient Name: Bere Warren : 1965   Medical   Diagnosis: Pain in right knee [M25.561] Treatment Diagnosis: R26.2   Onset Date: 10/14/21     Referral Source: Oseas Patel MD Start of Care [facility]): 10/21/2021   Prior Hospitalization: See medical history Provider #: [id number]   Prior Level of Function: Ind   Comorbidities: DM, hyperlipidemia, HTN, hx of CA, GERD, OA   Medications: Verified on Patient Summary List   The Plan of Care and following information is based on the information from the initial evaluation.   ==========================================================================================  Assessment / Functional Analysis:    Pt is a 63 y/o female who presents to physical therapy s/p R TKA on 10/14/21. Pt received in waiting area ambulating with 2WW. Pt reports increased pain and stiffness in R knee since surgery. Pt has been performing HEP. Prior to surgery pt was Ind with functional mobility. Pt presents today with decreased ROM, decreased strength, increased pain, and decreased ability to ambulate with normalized gait pattern.  Pt could benefit from skilled PT services to address the above impairments and to improve Pt ability to participate in functional activities of choice.       ==========================================================================================  Eval Complexity: History: MEDIUM  Complexity : 1-2 comorbidities / personal factors will impact the outcome/ POC Exam:LOW Complexity : 1-2 Standardized tests and measures addressing body structure, function, activity limitation and / or participation in recreation  Presentation: LOW Complexity : Stable, uncomplicated  Clinical Decision Making:MEDIUM Complexity : FOTO score of 26-74Overall Complexity:MEDIUM    Problem List: pain affecting function, decrease ROM, decrease strength, edema affecting function, impaired gait/ balance, decrease ADL/ functional abilitiies, decrease activity tolerance, decrease flexibility/ joint mobility and decrease transfer abilities   Treatment Plan may include any combination of the following: Therapeutic exercise, Therapeutic activities, Neuromuscular re-education, Physical agent/modality, Gait/balance training, Manual therapy and Patient education  Patient / Family readiness to learn indicated by: asking questions, trying to perform skills and interest  Persons(s) to be included in education: patient (P)  Barriers to Learning/Limitations: None      Patient self reported health status: good  Rehabilitation Potential: excellent    Objective Measures:  LEFS Score: 17.7  FOTO: 34 / 44 adjusted    ROM / Strength  []? Unable to assess                  AROM                      PROM                   Strength (1-5)      Left Right Left Right Left Right   Hip Flexion WFL   WFL     4+/5 4/5     Extension  WFL   WFL      4+/5 4/5     Abduction  WFL  WFL       4+/5 4/5     Adduction  WFL  WFL       4+/5 4/5   Knee Flexion WFL 60  80 4+/5 2-/5     Extension WFL -13  -11 4+/5 2-/5   Ankle Plantarflexion  WFL   WFL      5/5 5/5     Dorsiflexion  WFL  WFL       5/5 5/5        Short Term Goals:  1. Patient will report the knowledge of 3 exercises that can be used to help reduce symptoms to be able to ind reduce symptoms while at home. 2. Patient will demonstrate a 1/2 grade improvement in RLE MMT to be able to better ambulate with a normalized gait without pain. 3. Patient will demonstrate R knee PROM of 0-110 to facilitate increased ability to ascend/descend stairs. 4. Patient will increase LEFS > 10 points to facilitate increased ability to perform functional activities of choice. Long Term Goals:  1.  Patient will demonstrate a 2 grade improvement in RLE MMT to be able to better ambulate with a normalized gait without pain.  2. Patient will demonstrate R knee AROM of 0-120 deg or greater to be able to return to normal ambulation on level and unlevel surfaces. 3. Patient will demonstrate the ability to ambulate > 500 feet without an AD without evidence of antalgia to be able to return to an ind walking program following discharge. 4. Patient will increase LEFS > 20 points to facilitate increased ability to perform leisure activities of choice. Frequency / Duration: Patient to be seen  2-3  times per week for 6  weeks:  Patient / Caregiver education and instruction: self care, activity modification and exercises    Therapist Signature: Octavio Galaviz PT, DPT Date: 49/80/2867   Certification Period: 10/15/21 - 1/13/22 Time: 4:26 PM   ===========================================================================================  I certify that the above Physical Therapy Services are being furnished while the patient is under my care. I agree with the treatment plan and certify that this therapy is necessary. Physician Signature:        Date:       Time:     Please sign and return to [facility] or you may fax the signed copy to [phone]. Please call [phone] if more information required. Thank you.

## 2021-10-21 NOTE — PROGRESS NOTES
Subjective:      Patient presents for postop care following right TKA. Surgery was on 10/14/2021. Ambulating independantly . Pain is controlled with current analgesics. Medication(s) being used: Percocet. .    Objective: There were no vitals taken for this visit. General:  alert, cooperative, no distress, appears stated age   ROM: -5/100   Incision:   healing well, no drainage, no erythema, incision well approximated, moderate swelling     Assessment:     Doing well postoperatively. Plan:     1. Continue PT. 2. Wound care/showering discussed. 3. Continue DVT prophylaxis as directed. 4. Pt is to increase activities as tolerated. Follow up at 1 yr with Dr. Vu Shearer for xray's of the right knee and as needed. Pattie Harmon

## 2021-10-21 NOTE — LETTER
NOTIFICATION RETURN TO WORK / SCHOOL    10/21/2021 10:39 AM    Ms. [address] [id number]      To Whom It May Concern:    Belinda Cavazos is currently under the care of [address] [place] [address]. She will return to work for half shifts x 2 weeks on 10/25/2021 and then full duty two weeks after that. If there are questions or concerns please have the patient contact our office.         Sincerely,      GISSEL Parnell

## 2021-10-22 ENCOUNTER — HOSPITAL ENCOUNTER (OUTPATIENT)
Dept: PHYSICAL THERAPY | Age: 56
Discharge: HOME | End: 2021-10-22
Payer: COMMERCIAL

## 2021-10-25 ENCOUNTER — HOSPITAL ENCOUNTER (OUTPATIENT)
Dept: PHYSICAL THERAPY | Age: 56
Discharge: HOME | End: 2021-10-25
Payer: COMMERCIAL

## 2021-10-25 PROCEDURE — 97110 THERAPEUTIC EXERCISES: CPT

## 2021-10-25 PROCEDURE — 97016 VASOPNEUMATIC DEVICE THERAPY: CPT

## 2021-10-25 NOTE — PROGRESS NOTES
PT DAILY TREATMENT NOTE     Patient Name: Ondina Buckley  Date:10/25/2021  : 1965  [x]  Patient  Verified  Payor: Tika Spivey / Plan: [facility] HMO/CHOICE PLUS/POS / Product Type: HMO /    In time:930  Out time:1031  Total Treatment Time (min): 61  Total Timed Codes (min): 51   1:1 Treatment Time (min): 51  Visit #: 4      Treatment Area: Unilateral primary osteoarthritis, right knee [M17.11]    SUBJECTIVE  Pt reported that she was feeling better than last session. No AD used when pt arrived at PT today. Pain Level (0-10 scale): 0    Any medication changes, allergies to medications, adverse drug reactions, diagnosis change, or new procedure performed?: [x] No    [] Yes (see summary sheet for update)        OBJECTIVE  Modality rationale: decrease inflammation and decrease pain to improve the patients ability to CP/Vaso post session to decrease pain and edema from exercises.       Min Type Additional Details    [] Estim: []Att   []Unatt  []TENS instruct                 []IFC  []Premod []NMES                       []Other:  []w/US   []w/ice   []w/heat  Position:  Location:    []  Traction: [] Cervical       []Lumbar                       [] Prone          []Supine                       []Intermittent   []Continuous Lbs:  [] before manual  [] after manual    []  Ultrasound: []Continuous   [] Pulsed                           []1MHz   []3MHz Location:  W/cm2:    []  Ice     []  heat  []  Ice massage Position:  Location:   10 [x]  Vasopneumatic Device Pressure: [] lo [] med [] hi   Temp: [] lo [] med [] hi   [x] Skin assessment post-treatment:  [x]intact [x]redness- no adverse reaction       []redness  adverse reaction:       51 min Therapeutic Exercise:  [x] See flow sheet :   Rationale: increase ROM, increase strength and improve coordination to improve the patients ability to return to prior level of function before injury/illness with reduced pain, achieving optimal strength and function to perform household tasks, daily activities, and return to community events, and/or work. With TE  TA   NR  GT   [facility] Patient Education: [x] Review HEP    [] Progressed/Changed HEP based on:   [] positioning   [] body mechanics   [] transfers   [] heat/ice application          Patient's response to today's treatment: Began session with warm up on LBE, t was able to make full backwards revolutions after rocking a few times. Progressed with stretching and strengthening exercises as noted per flow sheet. Cont POC. Pain Level (0-10 scale) post treatment: 0    ASSESSMENT/Changes in Function: Continued with POC with exercises as noted per flow sheet. Pt able to tolerate today's session with minimal increase in pain, which resolved post exercise. Will cont to progress as able. Patient will continue to benefit from skilled PT services to modify and progress therapeutic interventions, address functional mobility deficits, address ROM deficits, address strength deficits and analyze and cue movement patterns to attain remaining goals.      [x]  See Plan of Care  []  See progress note/recertification  []  See Discharge Summary           PLAN  []  Upgrade activities as tolerated     [x]  Continue plan of care  []  Update interventions per flow sheet       []  Discharge due to:_  []  Other:_      TK Diaz 10/25/2021  1:34 PM

## 2021-10-27 ENCOUNTER — HOSPITAL ENCOUNTER (OUTPATIENT)
Dept: PHYSICAL THERAPY | Age: 56
Discharge: HOME | End: 2021-10-27
Payer: COMMERCIAL

## 2021-10-27 PROCEDURE — 97110 THERAPEUTIC EXERCISES: CPT

## 2021-10-27 PROCEDURE — 97016 VASOPNEUMATIC DEVICE THERAPY: CPT

## 2021-10-27 NOTE — PROGRESS NOTES
PT DAILY TREATMENT NOTE     Patient Name: Ephraim Burns  Date:10/27/2021  : 1965  [x]  Patient  Verified  Payor: Marixa Challenger / Plan: [facility] HMO/CHOICE PLUS/POS / Product Type: HMO /    In time:   Out time:953  Total Treatment Time (min): 68  Total Timed Codes (min): 58  1:1 Treatment Time (min): 48   Visit #: 5     Treatment Area: Unilateral primary osteoarthritis, right knee [M17.11]    SUBJECTIVE  \"My knee is really stiff today. \"     Pain Level (0-10 scale): 0/10    Any medication changes, allergies to medications, adverse drug reactions, diagnosis change, or new procedure performed?: [x] No    [] Yes (see summary sheet for update)        OBJECTIVE  Modality rationale: decrease inflammation, decrease pain and increase tissue extensibility to improve the patients ability to tolerate today's session, recover post treatment via vaso compression and heal optimally   Min Type Additional Details    [] Estim: []Att   []Unatt  []TENS instruct                 []IFC  []Premod []NMES                       []Other:  []w/US   []w/ice   []w/heat  Position:  Location:    []  Traction: [] Cervical       []Lumbar                       [] Prone          []Supine                       []Intermittent   []Continuous Lbs:  [] before manual  [] after manual    []  Ultrasound: []Continuous   [] Pulsed                           []1MHz   []3MHz Location:  W/cm2:   10 []  Ice     [x]  heat  []  Ice massage Position: seated   Location: R knee   10 [x]  Vasopneumatic Device Pressure: [x] lo [] med [] hi   Temp: [x] lo [] med [] hi   [] Skin assessment post-treatment:  []intact []redness- no adverse reaction       []redness  adverse reaction:       48 min Therapeutic Exercise:  [] See flow sheet :   Rationale: increase ROM and increase strength to improve the patients ability to return to prior level of function before injury/illness with reduced pain, achieving optimal strength and function to perform household tasks, daily activities, and return to community events, and/or work. min Gait Training:  ___ feet with ___ device on level surfaces with ___ level of assist   Rationale: With TE  TA   NR  GT   [facility] Patient Education: [x] Review HEP    [] Progressed/Changed HEP based on:   [] positioning   [] body mechanics   [] transfers   [] heat/ice application        Pain Level (0-10 scale) post treatment: 0/10    ASSESSMENT/Changes in Function: Session began with MHP to R knee followed by active warm up using bike. Supine mat exercise via heel slides and quad sets. Sitting on EOB for LAQ. Standing exercise of calf raises, mini squats and HSC promoting strength and range of motion in weight bearing position. Ended session with vaso compression to recover post rehab. Patient will continue to benefit from skilled PT services to modify and progress therapeutic interventions, address functional mobility deficits, address ROM deficits, address strength deficits, analyze and cue movement patterns, analyze and modify body mechanics/ergonomics and assess and modify postural abnormalities to attain remaining goals.      [x]  See Plan of Care  []  See progress note/recertification  []  See Discharge Summary           PLAN  []  Upgrade activities as tolerated     [x]  Continue plan of care  []  Update interventions per flow sheet       []  Discharge due to:_  []  Other:_      Meryle Roche , LPTA 10/27/2021  9:42 AM

## 2021-10-29 ENCOUNTER — HOSPITAL ENCOUNTER (OUTPATIENT)
Dept: PHYSICAL THERAPY | Age: 56
Discharge: HOME | End: 2021-10-29
Payer: COMMERCIAL

## 2021-10-29 PROCEDURE — 97016 VASOPNEUMATIC DEVICE THERAPY: CPT

## 2021-10-29 PROCEDURE — 97110 THERAPEUTIC EXERCISES: CPT

## 2021-10-29 NOTE — PROGRESS NOTES
PT DAILY TREATMENT NOTE     Patient Name: Bere Warren  Date:10/29/2021  : 1965  [x]  Patient  Verified  Payor: Farhat Duran / Plan: [employer] HMO/CHOICE PLUS/POS / Product Type: HMO /    In RMHN:[id number]  Out time:949  Total Treatment Time (min): 64  Total Timed Codes (min): 54  1:1 Treatment Time (min): 44   Visit #: 6    Treatment Area: Unilateral primary osteoarthritis, right knee [V58.15]    SUBJECTIVE  Chief complaint is stiffness and soreness in R knee. Pain Level (0-10 scale): 0    Any medication changes, allergies to medications, adverse drug reactions, diagnosis change, or new procedure performed?: [x] No    [] Yes (see summary sheet for update)    OBJECTIVE  Modality rationale: decrease inflammation, decrease pain and increase tissue extensibility to improve the patients ability to participate in rehab and post rehab activities. Min Type Additional Details    [] Estim: []Att   []Unatt  []TENS instruct                 []IFC  []Premod []NMES                       []Other:  []w/US   []w/ice   []w/heat  Position:  Location:    []  Traction: [] Cervical       []Lumbar                       [] Prone          []Supine                       []Intermittent   []Continuous Lbs:  [] before manual  [] after manual    []  Ultrasound: []Continuous   [] Pulsed                           []1MHz   []3MHz Location:  W/cm2:   10 []  Ice     [x]  heat  []  Ice massage Position: seated with R LE propped  Location: R knee   10 [x]  Vasopneumatic Device Pressure: [] lo [] med [] hi   Temp: [] lo [] med [] hi   [] Skin assessment post-treatment:  []intact []redness- no adverse reaction       []redness  adverse reaction:     44 min Therapeutic Exercise:  [] See flow sheet :   Rationale: increase ROM, increase strength, improve coordination and improve balance to improve the patients ability to reach personal goal of returning to work.            With JOSÉ MIGUEL PABON   NR  GT   Misc Patient Education: [x] Review HEP    [] Progressed/Changed HEP based on:   [] positioning   [] body mechanics   [] transfers   [] heat/ice application        Pain Level (0-10 scale) post treatment: 0    ASSESSMENT/Changes in Function: Session began with MHP followed by stationary bicycle working to promote muscular endurance and knee flexion. Continued with exercises working to increase quadriceps strength and motor control. Repetitions increased with mini squats and calf raises with no adverse effects. Resistance increased with LAQ and hamstrings curls. Introduced standing TKE to obtain terminal knee extension. Pt tolerated all progression well. Vaso post rehab to combat reported soreness. Will continue to progress within TKA protocol next visit. Patient will continue to benefit from skilled PT services to modify and progress therapeutic interventions, address functional mobility deficits, address ROM deficits, address strength deficits, analyze and address soft tissue restrictions, analyze and cue movement patterns, analyze and modify body mechanics/ergonomics, assess and modify postural abnormalities and address imbalance/dizziness to attain remaining goals.      [x]  See Plan of Care  []  See progress note/recertification  []  See Discharge Summary         PLAN  []  Upgrade activities as tolerated     [x]  Continue plan of care  []  Update interventions per flow sheet       []  Discharge due to:_  []  Other:_      TK Funez  10/29/2021  10:51 AM

## 2021-11-02 ENCOUNTER — HOSPITAL ENCOUNTER (OUTPATIENT)
Dept: PHYSICAL THERAPY | Age: 56
Discharge: HOME | End: 2021-11-02
Payer: COMMERCIAL

## 2021-11-02 PROCEDURE — 97110 THERAPEUTIC EXERCISES: CPT

## 2021-11-02 PROCEDURE — 97016 VASOPNEUMATIC DEVICE THERAPY: CPT

## 2021-11-02 NOTE — TELEPHONE ENCOUNTER
Patient contacted office to let us know she had the knee surgery on 10/14/21 and they have put her on a blood thinner for 4 weeks. She is not sure of the name of the medication but she wanted us to be informed.

## 2021-11-02 NOTE — PROGRESS NOTES
PT DAILY TREATMENT NOTE 8    Patient Name: Chet Bumpers  Date:2021  : 1965  [x]  Patient  Verified  Payor: Radha Win / Plan: [facility] HMO/CHOICE PLUS/POS / Product Type: HMO /    In time:1432  Out time:1536  Total Treatment Time (min): 64  Total Timed Codes (min): 54  1:1 Treatment Time (min): 44   Visit #: 7    Treatment Area: Unilateral primary osteoarthritis, right knee [M17.11]    SUBJECTIVE  Pt returned to work this week reporting increase stiffness and tightness in R knee from extended durations of standing and/or walking. Pain Level (0-10 scale): 0    Any medication changes, allergies to medications, adverse drug reactions, diagnosis change, or new procedure performed?: [x] No    [] Yes (see summary sheet for update)    OBJECTIVE  Modality rationale: decrease inflammation, decrease pain and increase tissue extensibility to improve the patients ability to participate in rehab and post rehab activities.     Min Type Additional Details    [] Estim: []Att   []Unatt  []TENS instruct                 []IFC  []Premod []NMES                       []Other:  []w/US   []w/ice   []w/heat  Position:  Location:    []  Traction: [] Cervical       []Lumbar                       [] Prone          []Supine                       []Intermittent   []Continuous Lbs:  [] before manual  [] after manual    []  Ultrasound: []Continuous   [] Pulsed                           []1MHz   []3MHz Location:  W/cm2:   10 []  Ice     [x]  heat  []  Ice massage Position: seated with R LE propped  Location: R knee   10 [x]  Vasopneumatic Device Pressure: [x] lo [] med [] hi   Temp: [] lo [x] med [] hi   [] Skin assessment post-treatment:  []intact []redness- no adverse reaction       []redness  adverse reaction:     42 min Therapeutic Exercise:  [] See flow sheet :   Rationale: increase ROM, increase strength, improve balance and increase proprioception to improve the patients ability to complete work related activities pain free. With TE  TA   NR  GT   [facility] Patient Education: [x] Review HEP    [] Progressed/Changed HEP based on:   [] positioning   [] body mechanics   [] transfers   [] heat/ice application        Pain Level (0-10 scale) post treatment: 0    ASSESSMENT/Changes in Function: Exercises completed per flowsheet working to achieve short term goals of demonstrating a 1/2 grade improvement in RLE MMT to be able to better ambulate with a normalized gait without pain and demonstrating R knee PROM of 0-110 to facilitate increased ability to ascend/descend stairs. Incorporated step ups working to increase R knee stability and overall R LE strength. Pt showed improve tolerance and endurance compared to previous sessions. Will continue POC and progress as able. Patient will continue to benefit from skilled PT services to modify and progress therapeutic interventions, address functional mobility deficits, address ROM deficits, address strength deficits, analyze and address soft tissue restrictions, analyze and cue movement patterns, analyze and modify body mechanics/ergonomics, assess and modify postural abnormalities and address imbalance/dizziness to attain remaining goals.      [x]  See Plan of Care  []  See progress note/recertification  []  See Discharge Summary         PLAN  []  Upgrade activities as tolerated     [x]  Continue plan of care  []  Update interventions per flow sheet       []  Discharge due to:_  []  Other:_      TK Funez  11/2/2021  4:59 PM

## 2021-11-04 ENCOUNTER — HOSPITAL ENCOUNTER (OUTPATIENT)
Dept: PHYSICAL THERAPY | Age: 56
Discharge: HOME | End: 2021-11-04
Payer: COMMERCIAL

## 2021-11-04 PROCEDURE — 97016 VASOPNEUMATIC DEVICE THERAPY: CPT

## 2021-11-04 PROCEDURE — 97110 THERAPEUTIC EXERCISES: CPT

## 2021-11-04 NOTE — PROGRESS NOTES
PT DAILY TREATMENT NOTE     Patient Name: Jolly Shankar  Date:2021  : 1965  [x]  Patient  Verified  Payor: Misty Fitch / Plan: [facility] [employer] HMO/CHOICE PLUS/POS / Product Type: HMO /    In time:1420  Out time:1529  Total Treatment Time (min): 69  Total Timed Codes (min): 59  1:1 Treatment Time (min): 49   Visit #: 8    Treatment Area: Unilateral primary osteoarthritis, right knee [M17.11]    SUBJECTIVE  Pt reports tightness and stiffness in R knee. She states pain increases throughout the day when walking and/or standing at work. Pain Level (0-10 scale):  2    Any medication changes, allergies to medications, adverse drug reactions, diagnosis change, or new procedure performed?: [x] No    [] Yes (see summary sheet for update)    OBJECTIVE  Modality rationale: decrease edema, decrease inflammation, decrease pain and increase tissue extensibility to improve the patients ability to optimally heal.   Min Type Additional Details    [] Estim: []Att   []Unatt  []TENS instruct                 []IFC  []Premod []NMES                       []Other:  []w/US   []w/ice   []w/heat  Position:  Location:    []  Traction: [] Cervical       []Lumbar                       [] Prone          []Supine                       []Intermittent   []Continuous Lbs:  [] before manual  [] after manual    []  Ultrasound: []Continuous   [] Pulsed                           []1MHz   []3MHz Location:  W/cm2:   10 []  Ice     [x]  heat  []  Ice massage Position: seated with R LE propped  Location: R knee   10 [x]  Vasopneumatic Device Pressure: [] lo [] med [] hi   Temp: [] lo [] med [] hi   [] Skin assessment post-treatment:  []intact []redness- no adverse reaction       []redness  adverse reaction:     45 min Therapeutic Exercise:  [x] See flow sheet :   Rationale: increase ROM, increase strength, improve coordination, improve balance and increase proprioception to improve the patients ability to complete work and household related tasks while remaining pain free. With TE  TA   NR  GT   Misc Patient Education: [x] Review HEP    [] Progressed/Changed HEP based on:   [] positioning   [] body mechanics   [] transfers   [] heat/ice application        Pain Level (0-10 scale) post treatment: 0    ASSESSMENT/Changes in Function: Continued within TKA protocol working to increase R knee strength, stability, and range of motion. Session began with MHP per patient's request followed by an active warm up via stationary bicycle. Exercises completed per flowsheet with patient showing a lack in flexion. Attempted PROM however patient is sensitive/tender on lateral aspect of knee. Vaso post rehab to combat soreness and pain. Will continue to progress as able. Patient will continue to benefit from skilled PT services to modify and progress therapeutic interventions, address functional mobility deficits, address ROM deficits, address strength deficits, analyze and address soft tissue restrictions, analyze and cue movement patterns, analyze and modify body mechanics/ergonomics, assess and modify postural abnormalities and address imbalance/dizziness to attain remaining goals.      [x]  See Plan of Care  []  See progress note/recertification  []  See Discharge Summary         PLAN  []  Upgrade activities as tolerated     [x]  Continue plan of care  []  Update interventions per flow sheet       []  Discharge due to:_  []  Other:_      TK Funez  11/4/2021  5:01 PM

## 2021-11-08 ENCOUNTER — HOSPITAL ENCOUNTER (OUTPATIENT)
Dept: PHYSICAL THERAPY | Age: 56
Discharge: HOME | End: 2021-11-08
Payer: COMMERCIAL

## 2021-11-08 PROCEDURE — 97110 THERAPEUTIC EXERCISES: CPT

## 2021-11-08 PROCEDURE — 97016 VASOPNEUMATIC DEVICE THERAPY: CPT

## 2021-11-08 NOTE — PROGRESS NOTES
PT DAILY TREATMENT NOTE     Patient Name: Roque Rivas  Date:2021  : 1965  [x]  Patient  Verified  Payor: Karen Mojica / Plan: [facility] HMO/CHOICE PLUS/POS / Product Type: HMO /    In time:1305  Out time:1411  Total Treatment Time (min): 66  Total Timed Codes (min): 66  1:1 Treatment Time (min): 66  Visit #: 9    Treatment Area: Unilateral primary osteoarthritis, right knee [M17.11]    SUBJECTIVE  Pt reports tightness and stiffness. Stated she is tired from working this morning.      Pain Level (0-10 scale): 0/10  Any medication changes, allergies to medications, adverse drug reactions, diagnosis change, or new procedure performed?: [x] No    [] Yes (see summary sheet for update)    OBJECTIVE  Modality rationale: decrease edema, decrease inflammation, decrease pain and increase tissue extensibility to improve the patients ability to optimally heal.   Min Type Additional Details    [] Estim: []Att   []Unatt  []TENS instruct                 []IFC  []Premod []NMES                       []Other:  []w/US   []w/ice   []w/heat  Position:  Location:    []  Traction: [] Cervical       []Lumbar                       [] Prone          []Supine                       []Intermittent   []Continuous Lbs:  [] before manual  [] after manual    []  Ultrasound: []Continuous   [] Pulsed                           []1MHz   []3MHz Location:  W/cm2:   10 []  Ice     [x]  heat  []  Ice massage Position: seated with R LE propped  Location: R knee   10 [x]  Vasopneumatic Device Pressure: [] lo [] med [] hi   Temp: [] lo [] med [] hi   [] Skin assessment post-treatment:  []intact []redness- no adverse reaction       []redness  adverse reaction:     46 min Therapeutic Exercise:  [x] See flow sheet :   Rationale: increase ROM, increase strength, improve coordination, improve balance and increase proprioception to improve the patients ability to complete work and household related tasks while remaining pain free. With TE  TA   NR  GT   Misc Patient Education: [x] Review HEP    [] Progressed/Changed HEP based on:   [] positioning   [] body mechanics   [] transfers   [] heat/ice application        Pain Level (0-10 scale) post treatment: 0/10    ASSESSMENT/Changes in Function: Continued within TKA protocol working to increase R knee strength, stability, and range of motion. Session began with MHP per patient's request followed by an active warm up via stationary bicycle. This session Pt was placed in heel prop for MHP to encourage knee extension stretching. Exercises completed per flowsheet with VC to ensure proper form. Attempted PROM however patient is resistive to this and pulls knee away. Educated Pt on importance of Heel slides and Heel prop at home to increase ROM. Vaso post rehab to combat soreness and pain. Will continue to progress as able. Patient will continue to benefit from skilled PT services to modify and progress therapeutic interventions, address functional mobility deficits, address ROM deficits, address strength deficits, analyze and address soft tissue restrictions, analyze and cue movement patterns, analyze and modify body mechanics/ergonomics, assess and modify postural abnormalities and address imbalance/dizziness to attain remaining goals.      [x]  See Plan of Care  []  See progress note/recertification  []  See Discharge Summary         PLAN  []  Upgrade activities as tolerated     [x]  Continue plan of care  []  Update interventions per flow sheet       []  Discharge due to:_  []  Other:_      Dionicia Hammans, LPTA  11/8/2021  5:01 PM

## 2021-11-09 ENCOUNTER — HOSPITAL ENCOUNTER (OUTPATIENT)
Dept: INFUSION THERAPY | Age: 56
Discharge: HOME | End: 2021-11-09
Payer: COMMERCIAL

## 2021-11-09 VITALS
TEMPERATURE: 97.7 F | HEART RATE: 84 BPM | DIASTOLIC BLOOD PRESSURE: 72 MMHG | RESPIRATION RATE: 18 BRPM | SYSTOLIC BLOOD PRESSURE: 120 MMHG | OXYGEN SATURATION: 96 %

## 2021-11-09 LAB
BASO+EOS+MONOS # BLD AUTO: 0.6 K/UL (ref 0–2.3)
BASO+EOS+MONOS NFR BLD AUTO: 7 % (ref 0.1–17)
DIFFERENTIAL METHOD BLD: ABNORMAL
ERYTHROCYTE [DISTWIDTH] IN BLOOD BY AUTOMATED COUNT: 14.8 % (ref 11.5–14.5)
HCT VFR BLD AUTO: 46.1 % (ref 36–48)
HGB BLD-MCNC: 15 G/DL (ref 12–16)
LYMPHOCYTES # BLD: 3 K/UL (ref 1.1–5.9)
LYMPHOCYTES NFR BLD: 34 % (ref 14–44)
MCH RBC QN AUTO: 29.7 PG (ref 25–35)
MCHC RBC AUTO-ENTMCNC: 32.5 G/DL (ref 31–37)
MCV RBC AUTO: 91.3 FL (ref 78–102)
NEUTS SEG # BLD: 5.4 K/UL (ref 1.8–9.5)
NEUTS SEG NFR BLD: 59 % (ref 40–70)
PLATELET # BLD AUTO: 258 K/UL (ref 140–440)
RBC # BLD AUTO: 5.05 M/UL (ref 4.1–5.1)
WBC # BLD AUTO: 9 K/UL (ref 4.5–13)

## 2021-11-09 PROCEDURE — 99195 PHLEBOTOMY: CPT

## 2021-11-09 PROCEDURE — 85025 COMPLETE CBC W/AUTO DIFF WBC: CPT

## 2021-11-09 PROCEDURE — 74011250636 HC RX REV CODE- 250/636: Performed by: INTERNAL MEDICINE

## 2021-11-09 PROCEDURE — 36415 COLL VENOUS BLD VENIPUNCTURE: CPT

## 2021-11-09 RX ORDER — SODIUM CHLORIDE 9 MG/ML
500 INJECTION, SOLUTION INTRAVENOUS CONTINUOUS
Status: DISCONTINUED | OUTPATIENT
Start: 2021-11-09 | End: 2021-11-10 | Stop reason: HOSPADM

## 2021-11-09 RX ADMIN — SODIUM CHLORIDE 500 ML: 9 INJECTION, SOLUTION INTRAVENOUS at 13:34

## 2021-11-09 NOTE — PROGRESS NOTES
[facility] Progress Note    Date: 2021    Name: Kenia Barreto    MRN: [id number]         : 1965    Ms. Rama Naik arrived in the [facility] Today at [address] in stable condition, here for CBC/Phlebotomy (monthly Status). She was assessed and education was provided. Verbal patient education on Therapeutic Phlebotomy, and potential side effects & adverse reactions was provided, and Ms. Eddy verbalized understanding. Ms. Eddy's vitals were reviewed. Visit Vitals  /72 (BP 1 Location: Left upper arm, BP Patient Position: At rest)   Pulse 84   Temp 97.7 °F (36.5 °C)   Resp 18   SpO2 96%   Breastfeeding No         Blood for the ordered CBC was drawn from her left AC without incident, and was processed on site. Lab results were obtained and reviewed, and the preliminary CBC results from today, were as follows:    Recent Results (from the past 12 hour(s))   CBC WITH 3 PART DIFF    Collection Time: 21  1:05 PM   Result Value Ref Range    WBC 9.0 4.5 - 13.0 K/uL    RBC 5.05 4. 10 - 5.10 M/uL    HGB 15.0 12.0 - 16 g/dL    HCT 46.1 36 - 48 %    MCV 91.3 78 - 102 FL    MCH 29.7 25.0 - 35.0 PG    MCHC 32.5 31 - 37 g/dL    RDW 14.8 (H) 11.5 - 14.5 %    PLATELET 644 283 - 913 K/uL    NEUTROPHILS 59 40 - 70 %    MIXED CELLS 7 0.1 - 17 %    LYMPHOCYTES 34 14 - 44 %    ABS. NEUTROPHILS 5.4 1.8 - 9.5 K/UL    ABS. MIXED CELLS 0.6 0.0 - 2.3 K/uL    ABS. LYMPHOCYTES 3.0 1.1 - 5.9 K/UL    DF AUTOMATED       Phlebotomy was indicated today per order, for HCT > 45.0. (monthly status parameters)      PIV # 25 G was established in her left AC without incident at 1318, and phlebotomy was started. Snack was offered, but was politely declined. At 1330 phlebotomy completed and 500ml whole blood removed then NS infusion started as ordered.  ml IV bolus was administered post phlebotomy, per order, and also without incident.      After completion of the NS Bolus, her PIV was removed and gauze/coban was applied. Ms. Marlyse Duane tolerated treatment very well today, and voiced no complaints. Ms. Marlyse Duane was discharged from [place] [address] in stable condition at 1410. She is to return in 1 week, on next Thursday, 11-16-21 at 1500, for her next appointment, for CBC/Phlebotomy (Weekly Status).      Steve Bob RN  November 9, 2021

## 2021-11-10 ENCOUNTER — HOSPITAL ENCOUNTER (OUTPATIENT)
Dept: PHYSICAL THERAPY | Age: 56
Discharge: HOME | End: 2021-11-10
Payer: COMMERCIAL

## 2021-11-10 PROCEDURE — 97016 VASOPNEUMATIC DEVICE THERAPY: CPT

## 2021-11-10 PROCEDURE — 97110 THERAPEUTIC EXERCISES: CPT

## 2021-11-10 NOTE — PROGRESS NOTES
PT DAILY TREATMENT NOTE 8-    Patient Name: Bere Warren  Date:11/10/2021  : 1965  [x]  Patient  Verified  Payor: Farhat Duran / Plan: [facility] HMO/CHOICE PLUS/POS / Product Type: HMO /    In time:1302  Out time:1458  Total Treatment Time (min): 56  Total Timed Codes (min): 56  1:1 Treatment Time (min): 56  Visit #: 10    Treatment Area: Unilateral primary osteoarthritis, right knee [M17.11]    SUBJECTIVE  Pt continues to report stiffness and soreness.  Reported increased pain after last visit after working on knee extension ROM    Pain Level (0-10 scale): 0/10  Any medication changes, allergies to medications, adverse drug reactions, diagnosis change, or new procedure performed?: [x] No    [] Yes (see summary sheet for update)    OBJECTIVE  Modality rationale: decrease edema, decrease inflammation, decrease pain and increase tissue extensibility to improve the patients ability to optimally heal.   Min Type Additional Details    [] Estim: []Att   []Unatt  []TENS instruct                 []IFC  []Premod []NMES                       []Other:  []w/US   []w/ice   []w/heat  Position:  Location:    []  Traction: [] Cervical       []Lumbar                       [] Prone          []Supine                       []Intermittent   []Continuous Lbs:  [] before manual  [] after manual    []  Ultrasound: []Continuous   [] Pulsed                           []1MHz   []3MHz Location:  W/cm2:    []  Ice     []  heat  []  Ice massage Position: seated with R LE propped  Location: R knee   10 [x]  Vasopneumatic Device Pressure: [x] lo [] med [] hi   Temp: [x] lo [] med [] hi   [] Skin assessment post-treatment:  []intact []redness- no adverse reaction       []redness - adverse reaction:     46 min Therapeutic Exercise:  [x] See flow sheet :   Rationale: increase ROM, increase strength, improve coordination, improve balance and increase proprioception to improve the patients ability to complete work and household related tasks while remaining pain free. With TE  TA   NR  GT   Misc Patient Education: [x] Review HEP    [] Progressed/Changed HEP based on:   [] positioning   [] body mechanics   [] transfers   [] heat/ice application        Pain Level (0-10 scale) post treatment: 0/10    ASSESSMENT/Changes in Function: Continued within TKA protocol working to increase R knee strength, stability, and range of motion. Session began with an active warm up via stationary bicycle. Focused on ROM and extension stretching this session. Added heel prop to encourage knee extension stretching. Exercises completed per flowsheet with VC to ensure proper form. Attempted PROM however patient is resistive to this and pulls knee away. Educated Pt on importance of Heel slides and Heel prop at home to increase ROM. Vaso post rehab to combat soreness and pain. Will continue to progress as able. Patient will continue to benefit from skilled PT services to modify and progress therapeutic interventions, address functional mobility deficits, address ROM deficits, address strength deficits, analyze and address soft tissue restrictions, analyze and cue movement patterns, analyze and modify body mechanics/ergonomics, assess and modify postural abnormalities and address imbalance/dizziness to attain remaining goals.      [x]  See Plan of Care  []  See progress note/recertification  []  See Discharge Summary         PLAN  []  Upgrade activities as tolerated     [x]  Continue plan of care  []  Update interventions per flow sheet       []  Discharge due to:_  []  Other:_      TK Traore  11/10/2021  5:01 PM

## 2021-11-12 ENCOUNTER — HOSPITAL ENCOUNTER (OUTPATIENT)
Dept: PHYSICAL THERAPY | Age: 56
Discharge: HOME | End: 2021-11-12
Payer: COMMERCIAL

## 2021-11-12 ENCOUNTER — APPOINTMENT (OUTPATIENT)
Dept: PHYSICAL THERAPY | Age: 56
End: 2021-11-12
Payer: COMMERCIAL

## 2021-11-12 PROCEDURE — 97016 VASOPNEUMATIC DEVICE THERAPY: CPT

## 2021-11-12 PROCEDURE — 97110 THERAPEUTIC EXERCISES: CPT

## 2021-11-12 NOTE — PROGRESS NOTES
PT DAILY TREATMENT NOTE 8    Patient Name: Fany Rodriguez  Date:2021  : 1965  [x]  Patient  Verified  Payor: Chente Alberto / Plan: [facility] HMO/CHOICE PLUS/POS / Product Type: HMO /    In time:1452  Out time:1605  Total Treatment Time (min): 73  Total Timed Codes (min): 63  1:1 Treatment Time (min): 63   Visit # 11      Treatment Area: Unilateral primary osteoarthritis, right knee [M17.11]    SUBJECTIVE  Pt reports that she's been back to work 1/2 days but will begin full days next week.    Pain Level (0-10 scale): /10  Any medication changes, allergies to medications, adverse drug reactions, diagnosis change, or new procedure performed?: [x] No    [] Yes (see summary sheet for update)        OBJECTIVE  Modality rationale: decrease edema and decrease pain to improve the patients ability to move/heal optimally    Min Type Additional Details    [] Estim: []Att   []Unatt  []TENS instruct                 []IFC  []Premod []NMES                       []Other:  []w/US   []w/ice   []w/heat  Position:  Location:    []  Traction: [] Cervical       []Lumbar                       [] Prone          []Supine                       []Intermittent   []Continuous Lbs:  [] before manual  [] after manual    []  Ultrasound: []Continuous   [] Pulsed                           []1MHz   []3MHz Location:  W/cm2:         []  Ice     []  heat  []  Ice massage Position:  Location:   10 [x]  Vasopneumatic Device Pressure: [x] lo [] med [] hi   Temp: [x] lo [] med [] hi   [x] Skin assessment post-treatment:  [x]intact []redness- no adverse reaction       []redness  adverse reaction:       63 min Therapeutic Exercise:  [x] See flow sheet :   Rationale: increase ROM, increase strength and improve balance to improve the patients ability to maximize pain-free daily activity, restore PLOF and aid in full return to work         With TE Patient Education: [x] Review HEP    [] Progressed/Changed HEP based on: [x] scar massage  [] body mechanics   [] transfers   [] heat/ice application          Pain Level (0-10 scale) post treatment: 0/10    ASSESSMENT/Changes in Function: Pt seen today for reassessment of knee ROM, LE strength, functional mobility with improvements measured in all areas. Scar massage performed and pt educated on longitudinal & cross-frictional directions to address tension and sensitivity. Skin along incision cleaned today with alcohol wipes and desensitized with repetition. Mobility addressed via prone hang stretch and PROM today, to pt tolerance. Wall squats performed with physioball to promote upright posture and cues provided for symmetrical weight distribution. Quad sets progressed to prone position with cues for correct performance. Plan to address deficits next week with focus on promoting end range mobility & CKC functioning.      []  See Plan of Care  [x]  See progress note/recertification  []  See Discharge Summary             PLAN  []  Upgrade activities as tolerated     []  Continue plan of care  []  Update interventions per flow sheet       []  Discharge due to:_  []  Other:_      Manpreet Jones, PT, DPT 11/12/2021  2:56 PM

## 2021-11-12 NOTE — PROGRESS NOTES
[address]  Phone: [phone]    Fax: [phone]   Progress Note/CONTINUED PLAN OF CARE for PHYSICAL THERAPY          Patient Name: Brock Peterson : 1965   Treatment/Medical Diagnosis: Unilateral primary osteoarthritis, right knee [M17.11]   Onset Date: 10/14/2021    Referral Source: Reid Meckel, MD [facility]): 10/21/2021   Prior Hospitalization: See Medical History Provider #: [id number]   Prior Level of Function: Independent   Comorbidities: DM, hyperlipidemia, HTN, hx of CA, GERD, OA   Medications: Verified on Patient Summary List   Visits from [facility]: 11 Missed Visits: 0       Objective Measures:  LEFS Score: 42.6  FOTO: 55     ROM / Strength  []? ? Unable to assess                  AROM                      PROM                   Strength (1-5)      Left Right Left Right Left Right   Hip Flexion WFL   WFL     4+/5 4+/5     Extension  WFL   WFL      4+/5 4/5     Abduction  WFL  WFL       4+/5 4/5     Adduction  WFL  WFL       4+/5 4/5   Knee Flexion 130 114   116 4+/5 4+/5     Extension WFL -8   -3 4+/5 4+/5         Short Term Goals:  1. Patient will report the knowledge of 3 exercises that can be used to help reduce symptoms to be able to ind reduce symptoms while at home. MET. 2. Patient will demonstrate a 1/2 grade improvement in RLE MMT to be able to better ambulate with a normalized gait without pain. MET. 3. Patient will demonstrate R knee PROM of 0-110 to facilitate increased ability to ascend/descend stairs. Progressing, flexion improved by 36 degrees and extension improved by 8 degrees. 4. Patient will increase LEFS > 10 points to facilitate increased ability to perform functional activities of choice. MET, LEFS improved by 24.9 points.     Long Term Goals:  1. Patient will demonstrate a 2 grade improvement in RLE MMT to be able to better ambulate with a normalized gait without pain. MET.   2. Patient will demonstrate R knee AROM of 0-120 deg or greater to be able to return to normal ambulation on level and unlevel surfaces. Progressing, as listed above. 3. Patient will demonstrate the ability to ambulate > 500 feet without an AD without evidence of antalgia to be able to return to an ind walking program following discharge. MET. 4. Patient will increase LEFS > 20 points to facilitate increased ability to perform leisure activities of choice. MET, LEFS improved by 24.9 points.       Assessment/ Patient Update: Pt is s/p R TKA 10/14/21 and has made measurable improvements with knee ROM, LE strength and functional mobility independence and safety. Pt has been able to RTW part-time. Lack of full ROM and decreased quadriceps strength and endurance prove most limiting at this time. Pt will continue to benefit from skilled PT intervention to target deficits in effort to maximize pain-free daily activity & return to PLOF. Problem List: pain affecting function, decrease ROM, decrease strength, edema affecting function, impaired gait/ balance, decrease activity tolerance and decrease flexibility/ joint mobility     Updated Plan of Care:    Treatment Plan to include the following per provider discretion: Therapeutic exercise, Therapeutic activities, Neuromuscular re-education, Physical agent/modality, Gait/balance training, Manual therapy, Patient education and Functional mobility training    Frequency / Duration:  Patient to be seen   2-3   times per week for   6  weeks    If you have any questions/comments please contact us directly at [phone]. Thank you for allowing us to assist in the care of your patient.     Therapist Signature: Davion Alcantara PT, DPT Date: 78/39/0448   Certification Period:  Reporting Period: 11/12/2021 - 01/31/2022  10/21/2021 - 11/12/2021 Time: 2:57 PM   NOTE TO PHYSICIAN:  PLEASE COMPLETE THE ORDERS BELOW AND FAX TO   [facility]: [phone]) [phone]. If you are unable to process this request in 24 hours please contact our office: [phone].    ___ I have read the above report and request that my patient continue as recommended.   ___ I have read the above report and request that my patient continue therapy with the following changes/special instructions: ________________________________________________   ___ I have read the above report and request that my patient be discharged from therapy. Physician Signature:        Date:       Time:    .

## 2021-11-16 ENCOUNTER — HOSPITAL ENCOUNTER (OUTPATIENT)
Dept: INFUSION THERAPY | Age: 56
Discharge: HOME | End: 2021-11-16
Payer: COMMERCIAL

## 2021-11-16 VITALS
DIASTOLIC BLOOD PRESSURE: 74 MMHG | HEART RATE: 84 BPM | TEMPERATURE: 97.4 F | OXYGEN SATURATION: 96 % | RESPIRATION RATE: 16 BRPM | SYSTOLIC BLOOD PRESSURE: 123 MMHG

## 2021-11-16 DIAGNOSIS — D75.1 SECONDARY POLYCYTHEMIA: Primary | ICD-10-CM

## 2021-11-16 DIAGNOSIS — D75.1 POLYCYTHEMIA: ICD-10-CM

## 2021-11-16 LAB
BASO+EOS+MONOS # BLD AUTO: 0.6 K/UL (ref 0–2.3)
BASO+EOS+MONOS NFR BLD AUTO: 7 % (ref 0.1–17)
DIFFERENTIAL METHOD BLD: ABNORMAL
ERYTHROCYTE [DISTWIDTH] IN BLOOD BY AUTOMATED COUNT: 14.7 % (ref 11.5–14.5)
HCT VFR BLD AUTO: 40.2 % (ref 36–48)
HGB BLD-MCNC: 13 G/DL (ref 12–16)
LYMPHOCYTES # BLD: 2.9 K/UL (ref 1.1–5.9)
LYMPHOCYTES NFR BLD: 37 % (ref 14–44)
MCH RBC QN AUTO: 29.5 PG (ref 25–35)
MCHC RBC AUTO-ENTMCNC: 32.3 G/DL (ref 31–37)
MCV RBC AUTO: 91.4 FL (ref 78–102)
NEUTS SEG # BLD: 4.4 K/UL (ref 1.8–9.5)
NEUTS SEG NFR BLD: 56 % (ref 40–70)
PLATELET # BLD AUTO: 233 K/UL (ref 140–440)
RBC # BLD AUTO: 4.4 M/UL (ref 4.1–5.1)
WBC # BLD AUTO: 7.9 K/UL (ref 4.5–13)

## 2021-11-16 PROCEDURE — 85025 COMPLETE CBC W/AUTO DIFF WBC: CPT

## 2021-11-16 PROCEDURE — 36415 COLL VENOUS BLD VENIPUNCTURE: CPT

## 2021-11-16 NOTE — PROGRESS NOTES
[facility] Progress Note    Date: 2021    Name: Dexter Soria    MRN: [id number]         : 1965    Ms. Nohemi Villasenro arrived in the [facility]  Today at [phone] in stable condition, here for CBC/Phlebotomy (Weekly Status). She was assessed and education was provided. Verbal patient education on Therapeutic Phlebotomy, and potential side effects & adverse reactions was provided, and Ms. Eddy verbalized understanding. Ms. Eddy's vitals were reviewed. Visit Vitals  /74 (BP 1 Location: Left upper arm, BP Patient Position: Sitting)   Pulse 84   Temp 97.4 °F (36.3 °C)   Resp 16   SpO2 96%         Blood for the ordered CBC was drawn from her left AC at 1504, without incident, and was processed on site. Lab results were obtained and reviewed, and the CBC results from today, were as follows:    Recent Results (from the past 12 hour(s))   CBC WITH 3 PART DIFF    Collection Time: 21  3:04 PM   Result Value Ref Range    WBC 7.9 4.5 - 13.0 K/uL    RBC 4.40 4. 10 - 5.10 M/uL    HGB 13.0 12.0 - 16 g/dL    HCT 40.2 36 - 48 %    MCV 91.4 78 - 102 FL    MCH 29.5 25.0 - 35.0 PG    MCHC 32.3 31 - 37 g/dL    RDW 14.7 (H) 11.5 - 14.5 %    PLATELET 021 189 - 274 K/uL    NEUTROPHILS 56 40 - 70 %    MIXED CELLS 7 0.1 - 17 %    LYMPHOCYTES 37 14 - 44 %    ABS. NEUTROPHILS 4.4 1.8 - 9.5 K/UL    ABS. MIXED CELLS 0.6 0.0 - 2.3 K/uL    ABS. LYMPHOCYTES 2.9 1.1 - 5.9 K/UL    DF AUTOMATED             Phlebotomy was HELD today per order, for HCT < 42.0. (weekly status parameters)        Ms. Eddy tolerated well and voiced no complaints. Ms. Nohemi Villasenor was discharged from [place] [address] in stable condition at 1515. Cher Yoo She is to return in 1 month, on Tuesday, 21 at 1500, for her next appointment, for CBC/Phlebotomy (Monthly Status).      Hendricks Severs, RN  2021  3:00 PM

## 2021-11-17 ENCOUNTER — HOSPITAL ENCOUNTER (OUTPATIENT)
Dept: PHYSICAL THERAPY | Age: 56
Discharge: HOME | End: 2021-11-17
Payer: COMMERCIAL

## 2021-11-17 PROCEDURE — 97110 THERAPEUTIC EXERCISES: CPT

## 2021-11-17 PROCEDURE — 97016 VASOPNEUMATIC DEVICE THERAPY: CPT

## 2021-11-17 NOTE — PROGRESS NOTES
PT DAILY TREATMENT NOTE 8    Patient Name: Won Lora  Date:2021  : 1965  [x]  Patient  Verified  Payor: Corrie Res / Plan: [facility] HMO/CHOICE PLUS/POS / Product Type: HMO /    In time:1345  Out time:1508  Total Treatment Time (min): 83  Total Timed Codes (min): 74  1:1 Treatment Time (min): 74  Visit # 12       Treatment Area: Unilateral primary osteoarthritis, right knee [M17.11]    SUBJECTIVE  Pt reports working in getting her knee straight at home.    Pain Level (0-10 scale): 1/10  Any medication changes, allergies to medications, adverse drug reactions, diagnosis change, or new procedure performed?: [x] No    [] Yes (see summary sheet for update)        OBJECTIVE  Modality rationale: decrease edema and decrease pain to improve the patients ability to move/heal optimally    Min Type Additional Details    [] Estim: []Att   []Unatt  []TENS instruct                 []IFC  []Premod []NMES                       []Other:  []w/US   []w/ice   []w/heat  Position:  Location:    []  Traction: [] Cervical       []Lumbar                       [] Prone          []Supine                       []Intermittent   []Continuous Lbs:  [] before manual  [] after manual    []  Ultrasound: []Continuous   [] Pulsed                           []1MHz   []3MHz Location:  W/cm2:        8 []  Ice     [x]  heat  []  Ice massage Position:heel prop  LocationR knee   10 [x]  Vasopneumatic Device Pressure: [x] lo [] med [] hi   Temp: [x] lo [] med [] hi   [x] Skin assessment post-treatment:  [x]intact []redness- no adverse reaction       []redness  adverse reaction:       56 min Therapeutic Exercise:  [x] See flow sheet :   Rationale: increase ROM, increase strength and improve balance to improve the patients ability to maximize pain-free daily activity, restore PLOF and aid in full return to work         With TE Patient Education: [x] Review HEP    [] Progressed/Changed HEP based on:   [x] scar massage  [] body mechanics   [] transfers   [] heat/ice application          Pain Level (0-10 scale) post treatment: 0/10    ASSESSMENT/Changes in Function: Began session with MHP per request due to reported stiffness followed by Bike for active warm up. Continued with updated POC with focus on extension ROM. After TE per flow sheet performed PROM into extension with oscillations to relax and decrease pain. Decreased tolerance for extension stretching however is willing to participate. Ended session on VASO. Will continue per revised POC.    PLAN:     [x]  See Plan of Care  [x]  See progress note/recertification  []  See Discharge Summary             PLAN  []  Upgrade activities as tolerated     [x]  Continue plan of care  []  Update interventions per flow sheet       []  Discharge due to:_  []  Other:_      Sakshi Milian PTA 11/17/2021  2:56 PM

## 2021-11-18 ENCOUNTER — HOSPITAL ENCOUNTER (OUTPATIENT)
Dept: PHYSICAL THERAPY | Age: 56
Discharge: HOME | End: 2021-11-18
Payer: COMMERCIAL

## 2021-11-18 PROCEDURE — 97016 VASOPNEUMATIC DEVICE THERAPY: CPT

## 2021-11-18 PROCEDURE — 97032 APPL MODALITY 1+ESTIM EA 15: CPT

## 2021-11-18 PROCEDURE — 97110 THERAPEUTIC EXERCISES: CPT

## 2021-11-18 NOTE — PROGRESS NOTES
PT DAILY TREATMENT NOTE 8-14    Patient Name: Ton Kim  Date:2021  : 1965  [x]  Patient  Verified  Payor: Becki Oliver / Plan: [facility] [employer] HMO/CHOICE PLUS/POS / Product Type: HMO /    In time:1308  Out time:1351  Total Treatment Time (min): 43  Total Timed Codes (min): 43  1:1 Treatment Time (min): 43  Visit # 13       Treatment Area: Unilateral primary osteoarthritis, right knee [M17.11]    SUBJECTIVE  Pt reported feeling sore from yesterdays treatment and wanted to start with stretching  Pain Level (0-10 scale): 6/10  Any medication changes, allergies to medications, adverse drug reactions, diagnosis change, or new procedure performed?: [x] No    [] Yes (see summary sheet for update)    OBJECTIVE  Modality rationale: decrease edema and decrease pain to improve the patients ability to move/heal optimally    Min Type Additional Details    [] Estim: []Att   []Unatt  []TENS instruct                 []IFC  []Premod []NMES                       []Other:  []w/US   []w/ice   []w/heat  Position:  Location:    []  Traction: [] Cervical       []Lumbar                       [] Prone          []Supine                       []Intermittent   []Continuous Lbs:  [] before manual  [] after manual    []  Ultrasound: []Continuous   [] Pulsed                           []1MHz   []3MHz Location:  W/cm2:         []  Ice     []  heat  []  Ice massage Position:heel prop  LocationR knee   10 [x]  Vasopneumatic Device Pressure: [x] lo [] med [] hi   Temp: [x] lo [] med [] hi   [x] Skin assessment post-treatment:  [x]intact []redness- no adverse reaction       []redness - adverse reaction:       23 min Therapeutic Exercise:  [x] See flow sheet :   Rationale: increase ROM, increase strength and improve balance to improve the patients ability to maximize pain-free daily activity, restore PLOF and aid in full return to work         With TE Patient Education: [x] Review HEP    [] Progressed/Changed HEP based on:   [x] scar massage  [] body mechanics   [] transfers   [] heat/ice application          Pain Level (0-10 scale) post treatment: 8/10    ASSESSMENT/Changes in Function:  began session with PROM into extension with oscillations to relax and decrease pain. Continued with updated POC with focus on extension ROM. Decreased tolerance for exercise today she is unable to complete all exercise this session due to pain. Pt reported feeing dizzy and 8/10 pain. Pt reported she thinks she has stopped taking one of her medications because she forgot to get her prescription picked up. Ended session on VASO. Will continue per revised POC.    PLAN:     [x]  See Plan of Care  [x]  See progress note/recertification  []  See Discharge Summary             PLAN  []  Upgrade activities as tolerated     [x]  Continue plan of care  []  Update interventions per flow sheet       []  Discharge due to:_  []  Other:_      Evon Moore, PTA 11/18/2021  2:56 PM

## 2021-11-22 ENCOUNTER — HOSPITAL ENCOUNTER (OUTPATIENT)
Dept: PHYSICAL THERAPY | Age: 56
Discharge: HOME | End: 2021-11-22
Payer: COMMERCIAL

## 2021-11-22 PROCEDURE — 97110 THERAPEUTIC EXERCISES: CPT

## 2021-11-22 PROCEDURE — 97016 VASOPNEUMATIC DEVICE THERAPY: CPT

## 2021-11-22 NOTE — PROGRESS NOTES
PT DAILY TREATMENT NOTE     Patient Name: Dexter Soria  Date:2021  : 1965  [x]  Patient  Verified  Payor: Darryl Ragland / Plan: [facility] [employer] HMO/CHOICE PLUS/POS / Product Type: HMO /    In time:1407  Out time:1517  Total Treatment Time (min): 70  Total Timed Codes (min): 60  1:1 Treatment Time (min): 50   Visit #: 14    Treatment Area: Unilateral primary osteoarthritis, right knee [P16.33]    SUBJECTIVE  Chief complaint is stiffness in R knee which she attributes to the rainy weather. Pain Level (0-10 scale): 1    Any medication changes, allergies to medications, adverse drug reactions, diagnosis change, or new procedure performed?: [x] No    [] Yes (see summary sheet for update)    OBJECTIVE  Modality rationale: decrease inflammation, decrease pain and increase tissue extensibility to improve the patients ability to fully heal and participate in rehab activities. Min Type Additional Details    [] Estim: []Att   []Unatt  []TENS instruct                 []IFC  []Premod []NMES                       []Other:  []w/US   []w/ice   []w/heat  Position:  Location:    []  Traction: [] Cervical       []Lumbar                       [] Prone          []Supine                       []Intermittent   []Continuous Lbs:  [] before manual  [] after manual    []  Ultrasound: []Continuous   [] Pulsed                           []1MHz   []3MHz Location:  W/cm2:   10 []  Ice     [x]  heat  []  Ice massage Position: seated with R LE propped  Location: R knee   10 [x]  Vasopneumatic Device Pressure: [] lo [] med [] hi   Temp: [] lo [] med [] hi   [] Skin assessment post-treatment:  []intact []redness- no adverse reaction       []redness  adverse reaction:     47 min Therapeutic Exercise:  [x] See flow sheet :   Rationale: increase ROM, increase strength, improve coordination and improve balance to improve the patients ability to complete work related duties pain free.      With TE  TA NR  GT   Misc Patient Education: [x] Review HEP    [] Progressed/Changed HEP based on:   [] positioning   [] body mechanics   [] transfers   [] heat/ice application        Pain Level (0-10 scale) post treatment: 0    ASSESSMENT/Changes in Function: Continued to address range of motion and strength deficits in R knee. Session began with MHP per patient's request. Extension based exercises followed with PROM performed to patient's tolerance. Continued with wall slides and wall squats to increase B LE strength and knee flexion. Pt was able to achieve AAROM 0-117 degrees. Will continue POC and progress as able. Patient will continue to benefit from skilled PT services to modify and progress therapeutic interventions, address functional mobility deficits, address ROM deficits, address strength deficits, analyze and address soft tissue restrictions, analyze and cue movement patterns, analyze and modify body mechanics/ergonomics, assess and modify postural abnormalities and address imbalance/dizziness to attain remaining goals.      [x]  See Plan of Care  []  See progress note/recertification  []  See Discharge Summary        PLAN  []  Upgrade activities as tolerated     [x]  Continue plan of care  []  Update interventions per flow sheet       []  Discharge due to:_  []  Other:_      TK Funez  11/22/2021  4:47 PM

## 2021-12-02 ENCOUNTER — HOSPITAL ENCOUNTER (OUTPATIENT)
Dept: PHYSICAL THERAPY | Age: 56
Discharge: HOME | End: 2021-12-02
Payer: COMMERCIAL

## 2021-12-02 PROCEDURE — 97110 THERAPEUTIC EXERCISES: CPT

## 2021-12-02 NOTE — PROGRESS NOTES
PT DAILY TREATMENT NOTE 8-14    Patient Name: Leela Celestin  Date:2021  : 1965  [x]  Patient  Verified  Payor: Augustus Monge / Plan: [address] HMO/CHOICE PLUS/POS / Product Type: HMO /    In time:1457  Out time:1538  Total Treatment Time (min): 41  Total Timed Codes (min): 41  1:1 Treatment Time (min): 41   Visit # 15      Treatment Area: Unilateral primary osteoarthritis, right knee [M17.11]    SUBJECTIVE  Pt denies complaints upon arrival today, states that she is able to do everything she wants to do at home & work. Pain Level (0-10 scale): 0/10  Any medication changes, allergies to medications, adverse drug reactions, diagnosis change, or new procedure performed?: [x] No    [] Yes (see summary sheet for update)        OBJECTIVE    41 min Therapeutic Exercise:  [x] See flow sheet :   Rationale: increase ROM, increase strength and improve balance to improve the patients ability to restore PLOF             With TE Patient Education: [x] Review HEP with updated pictorial handout provided    [] Progressed/Changed HEP based on:   [] positioning   [] body mechanics   [] transfers   [] heat/ice application          Pain Level (0-10 scale) post treatment: 0/10    ASSESSMENT/Changes in Function:  Reassessment today of knee ROM, LE strength and overall function with improvements measured. Pt states that she is able to perform exercises at home now & is pain-free, sleeping well and working full-time without difficulty.      []  See Plan of Care  []  See progress note/recertification  [x]  See Discharge Summary             PLAN  []  Upgrade activities as tolerated     []  Continue plan of care  []  Update interventions per flow sheet       [x]  Discharge due to: goals achieved  []  Other:_      Audra Samuel, PT, DPT 2021  3:01 PM

## 2021-12-02 NOTE — PROGRESS NOTES
[facility] PHYSICAL THERAPY  [address] [place] [address] Wes Bains, [address] * Phone: [phone] * Fax: [phone]  DISCHARGE SUMMARY FOR PHYSICAL THERAPY            Patient Name: Pravin Figueroa : 1965   Treatment/Medical Diagnosis: Unilateral primary osteoarthritis, right knee [M17.11]   Onset Date: 10/14/21    Referral Source: Devang Chang MD [facility]): 10/21/21   Prior Hospitalization: See Medical History Provider #: [id number]   Prior Level of Function: independent   Comorbidities: DM, hyperlipidemia, HTN, hx of CA, GERD, OA   Medications: Verified on Patient Summary List   Visits from [facility]: 15 Missed Visits: 0       Subjective:  Pt denies complaints upon arrival today, states that she is able to do everything she wants to do at home & work. Objective:  LEFS Score: 68.3  FOTO: 76     ROM / Strength  []? ?? Unable to assess                  AROM                      PROM                   Strength (1-5)      Left Right Left Right Left Right   Hip Flexion WFL   WFL     5/5 5/5     Extension  WFL   WFL      4+/5 4+/5     Abduction  WFL  WFL       5/5 5/5     Adduction  WFL  WFL       5/5 5/5   Knee Flexion 130 115   120 4+/5 4+/5     Extension WFL -4   0 4+/5 4+/5         Short Term Goals:  1. Patient will report the knowledge of 3 exercises that can be used to help reduce symptoms to be able to ind reduce symptoms while at home. MET, 21. 2. Patient will demonstrate a 1/2 grade improvement in RLE MMT to be able to better ambulate with a normalized gait without pain. MET, 21. .  3. Patient will demonstrate R knee PROM of 0-110 to facilitate increased ability to ascend/descend stairs. MET. 4. Patient will increase LEFS > 10 points to facilitate increased ability to perform functional activities of choice. MET, 21.     Long Term Goals:  1.  Patient will demonstrate a 2 grade improvement in RLE MMT to be able to better ambulate with a normalized gait without pain. MET, 11/12/21. 2. Patient will demonstrate R knee AROM of 0-120 deg or greater to be able to return to normal ambulation on level and unlevel surfaces. Met with function. Progressing measured with mobility, as listed above. 3. Patient will demonstrate the ability to ambulate > 500 feet without an AD without evidence of antalgia to be able to return to an ind walking program following discharge. MET, 11/12/21. 4. Patient will increase LEFS > 20 points to facilitate increased ability to perform leisure activities of choice. MET, 11/12/21.            Assessments/Recommendations: Discontinue therapy. Progressing towards or have reached established goals. If you have any questions/comments please contact us directly at [phone]. Thank you for allowing us to assist in the care of your patient. Therapist Signature: Tonie Moyre PT, DPT Date: 12/2/2021   Reporting Period: 10/21/21 - 12/2/21 Time: 0:17 PM      Certification Period: 10/21/21 - 1/31/22       NOTE TO PHYSICIAN:  PLEASE COMPLETE THE ORDERS BELOW AND FAX TO   [facility] Physical Therapy: [phone]. If you are unable to process this request in 24 hours please contact our office: [phone].    ___ I have read the above report and request that my patient be discharged from therapy.      Physician Signature:        Date:       Time:

## 2021-12-14 ENCOUNTER — APPOINTMENT (OUTPATIENT)
Dept: INFUSION THERAPY | Age: 56
End: 2021-12-14

## 2022-01-04 ENCOUNTER — APPOINTMENT (OUTPATIENT)
Dept: INFUSION THERAPY | Age: 57
End: 2022-01-04

## 2022-01-06 ENCOUNTER — HOSPITAL ENCOUNTER (OUTPATIENT)
Dept: INFUSION THERAPY | Age: 57
Discharge: HOME | End: 2022-01-06
Payer: COMMERCIAL

## 2022-01-06 VITALS
RESPIRATION RATE: 16 BRPM | HEART RATE: 68 BPM | OXYGEN SATURATION: 99 % | SYSTOLIC BLOOD PRESSURE: 146 MMHG | TEMPERATURE: 97.4 F | DIASTOLIC BLOOD PRESSURE: 85 MMHG

## 2022-01-06 LAB
BASO+EOS+MONOS # BLD AUTO: 0.6 K/UL (ref 0–2.3)
BASO+EOS+MONOS NFR BLD AUTO: 8 % (ref 0.1–17)
DIFFERENTIAL METHOD BLD: NORMAL
ERYTHROCYTE [DISTWIDTH] IN BLOOD BY AUTOMATED COUNT: 13.8 % (ref 11.5–14.5)
HCT VFR BLD AUTO: 45.3 % (ref 36–48)
HGB BLD-MCNC: 14.6 G/DL (ref 12–16)
LYMPHOCYTES # BLD: 2.9 K/UL (ref 1.1–5.9)
LYMPHOCYTES NFR BLD: 35 % (ref 14–44)
MCH RBC QN AUTO: 29.4 PG (ref 25–35)
MCHC RBC AUTO-ENTMCNC: 32.2 G/DL (ref 31–37)
MCV RBC AUTO: 91.1 FL (ref 78–102)
NEUTS SEG # BLD: 4.8 K/UL (ref 1.8–9.5)
NEUTS SEG NFR BLD: 57 % (ref 40–70)
PLATELET # BLD AUTO: 201 K/UL (ref 140–440)
RBC # BLD AUTO: 4.97 M/UL (ref 4.1–5.1)
WBC # BLD AUTO: 8.3 K/UL (ref 4.5–13)

## 2022-01-06 PROCEDURE — 85025 COMPLETE CBC W/AUTO DIFF WBC: CPT

## 2022-01-06 PROCEDURE — 36415 COLL VENOUS BLD VENIPUNCTURE: CPT

## 2022-01-06 PROCEDURE — 99195 PHLEBOTOMY: CPT

## 2022-01-06 NOTE — PROGRESS NOTES
[facility] Progress Note    Date: 2022    Name: Saad Weaver    MRN: [id number]         : 1965    Ms. Fei Tripp arrived in the [facility]  Today at [phone] in stable condition, here for CBC/Phlebotomy (Weekly Status). She was assessed and education was provided. Verbal patient education on Therapeutic Phlebotomy, and potential side effects & adverse reactions was provided, and Ms. Eddy verbalized understanding. Ms. Eddy's vitals were reviewed. Visit Vitals  BP (!) 160/79 (BP 1 Location: Left upper arm, BP Patient Position: Reclining)   Pulse 70   Temp 97.8 °F (36.6 °C)   Resp 16   SpO2 95%   Breastfeeding No         Blood for the ordered CBC was drawn from her [facility] [place] and was processed on site. Lab results were obtained and reviewed, and the CBC results from today, were as follows:    Recent Results (from the past 12 hour(s))   CBC WITH 3 PART DIFF    Collection Time: 22  1:55 PM   Result Value Ref Range    WBC 8.3 4.5 - 13.0 K/uL    RBC 4.97 4.10 - 5.10 M/uL    HGB 14.6 12.0 - 16 g/dL    HCT 45.3 36 - 48 %    MCV 91.1 78 - 102 FL    MCH 29.4 25.0 - 35.0 PG    MCHC 32.2 31 - 37 g/dL    RDW 13.8 11.5 - 14.5 %    PLATELET 649 740 - 807 K/uL    NEUTROPHILS 57 40 - 70 %    MIXED CELLS 8 0.1 - 17 %    LYMPHOCYTES 35 14 - 44 %    ABS. NEUTROPHILS 4.8 1.8 - 9.5 K/UL    ABS. MIXED CELLS 0.6 0.0 - 2.3 K/uL    ABS. LYMPHOCYTES 2.9 1.1 - 5.9 K/UL    DF AUTOMATED       Phlebotomy was indicated today per order, for HCT > 45.0. (monthly status parameters)        PIV # 20 G was established in her [facility] [place] and phlebotomy was started at 1406.      Snack was offered, but was politely declined.     At 1430 phlebotomy completed and 500 ml whole blood removed then NS infusion started as ordered.      ml IV bolus was administered post phlebotomy, per order, and also without incident.      After completion of the NS Bolus, her PIV was removed and gauze/coban was applied.        Ms. Fei Tripp tolerated well and voiced no complaints. Ms. Ryan Romero was discharged from [place] [address] in stable condition at 1515. She is to return in 1 week on 01/13/2022 at 1400, for her next appointment, for CBC/Phlebotomy (Weekly Status).        Joselin Mo  January 6, 2022

## 2022-01-13 ENCOUNTER — HOSPITAL ENCOUNTER (OUTPATIENT)
Dept: INFUSION THERAPY | Age: 57
Discharge: HOME | End: 2022-01-13
Payer: COMMERCIAL

## 2022-01-13 VITALS
OXYGEN SATURATION: 98 % | TEMPERATURE: 98 F | SYSTOLIC BLOOD PRESSURE: 128 MMHG | HEART RATE: 69 BPM | DIASTOLIC BLOOD PRESSURE: 78 MMHG | RESPIRATION RATE: 16 BRPM

## 2022-01-13 LAB
BASO+EOS+MONOS # BLD AUTO: 1 K/UL (ref 0–2.3)
BASO+EOS+MONOS NFR BLD AUTO: 11 % (ref 0.1–17)
DIFFERENTIAL METHOD BLD: NORMAL
ERYTHROCYTE [DISTWIDTH] IN BLOOD BY AUTOMATED COUNT: 13.7 % (ref 11.5–14.5)
HCT VFR BLD AUTO: 44.6 % (ref 36–48)
HGB BLD-MCNC: 14.4 G/DL (ref 12–16)
LYMPHOCYTES # BLD: 3.1 K/UL (ref 1.1–5.9)
LYMPHOCYTES NFR BLD: 34 % (ref 14–44)
MCH RBC QN AUTO: 29.5 PG (ref 25–35)
MCHC RBC AUTO-ENTMCNC: 32.3 G/DL (ref 31–37)
MCV RBC AUTO: 91.4 FL (ref 78–102)
NEUTS SEG # BLD: 5 K/UL (ref 1.8–9.5)
NEUTS SEG NFR BLD: 55 % (ref 40–70)
PLATELET # BLD AUTO: 251 K/UL (ref 140–440)
RBC # BLD AUTO: 4.88 M/UL (ref 4.1–5.1)
WBC # BLD AUTO: 9.1 K/UL (ref 4.5–13)

## 2022-01-13 PROCEDURE — 36415 COLL VENOUS BLD VENIPUNCTURE: CPT

## 2022-01-13 PROCEDURE — 99195 PHLEBOTOMY: CPT

## 2022-01-13 PROCEDURE — 74011250636 HC RX REV CODE- 250/636: Performed by: INTERNAL MEDICINE

## 2022-01-13 PROCEDURE — 85025 COMPLETE CBC W/AUTO DIFF WBC: CPT

## 2022-01-13 RX ORDER — SODIUM CHLORIDE 9 MG/ML
999 INJECTION, SOLUTION INTRAVENOUS ONCE
Status: COMPLETED | OUTPATIENT
Start: 2022-01-13 | End: 2022-01-13

## 2022-01-13 RX ADMIN — SODIUM CHLORIDE 999 ML/HR: 9 INJECTION, SOLUTION INTRAVENOUS at 14:09

## 2022-01-13 NOTE — PROGRESS NOTES
\[facility]\"" Progress Note    Date: 2022    Name: Shiv Cheema    MRN: [id number]         : 1965    Ms. Boy Farooq arrived in the [facility]  Today at [phone] in stable condition, here for CBC/Phlebotomy (Weekly Status). She was assessed and education was provided. .       Ms. Eddy's vitals were reviewed. Visit Vitals  /78 (BP 1 Location: Left upper arm, BP Patient Position: Sitting)   Pulse 69   Temp 98 °F (36.7 °C)   Resp 16   SpO2 98%         Blood for the ordered CBC was drawn from her left [facility] by [facility] phlebotomist and was processed on site. Lab results were obtained and reviewed, and the CBC results from today, were as follows:    Recent Results (from the past 12 hour(s))   CBC WITH 3 PART DIFF    Collection Time: 22  1:40 PM   Result Value Ref Range    WBC 9.1 4.5 - 13.0 K/uL    RBC 4.88 4.10 - 5.10 M/uL    HGB 14.4 12.0 - 16 g/dL    HCT 44.6 36 - 48 %    MCV 91.4 78 - 102 FL    MCH 29.5 25.0 - 35.0 PG    MCHC 32.3 31 - 37 g/dL    RDW 13.7 11.5 - 14.5 %    PLATELET 955 580 - 750 K/uL    NEUTROPHILS 55 40 - 70 %    MIXED CELLS 11 0.1 - 17 %    LYMPHOCYTES 34 14 - 44 %    ABS. NEUTROPHILS 5.0 1.8 - 9.5 K/UL    ABS. MIXED CELLS 1.0 0.0 - 2.3 K/uL    ABS. LYMPHOCYTES 3.1 1.1 - 5.9 K/UL    DF AUTOMATED       Today's Hct 44.6, Phlebotomy was indicated today per order, for HCT > 42.0 weekly status parameters.       PIV # 20 G started in her left AC and phlebotomy was started at 1353. Ginger ale given.     At 1409 phlebotomy completed and 500 ml whole blood removed then NS 500ml bolus started as ordered. Pt tolerated procedure without any problems.      After completion of the NS Bolus, her PIV was removed and gauze/coban was applied.        Ms. Eddy tolerated well and voiced no complaints. Ms. Boy Farooq was discharged from [place] [address] in stable condition at 1435.   She is to return in 1 week on 2022 at 1400, for her next appointment, for CBC/Phlebotomy (Weekly Status).        José Antonio Trejo, RN,RN  January 13, 2022

## 2022-01-19 DIAGNOSIS — Z85.3 HISTORY OF RIGHT BREAST CANCER: ICD-10-CM

## 2022-01-20 RX ORDER — GABAPENTIN 100 MG/1
CAPSULE ORAL
Qty: 90 CAPSULE | Refills: 0 | Status: SHIPPED | OUTPATIENT
Start: 2022-01-20 | End: 2022-04-04

## 2022-02-01 ENCOUNTER — HOSPITAL ENCOUNTER (OUTPATIENT)
Dept: LAB | Age: 57
Discharge: HOME | End: 2022-02-01
Payer: COMMERCIAL

## 2022-02-01 ENCOUNTER — OFFICE VISIT (OUTPATIENT)
Dept: FAMILY MEDICINE CLINIC | Age: 57
End: 2022-02-01
Payer: COMMERCIAL

## 2022-02-01 ENCOUNTER — TELEPHONE (OUTPATIENT)
Dept: FAMILY MEDICINE CLINIC | Age: 57
End: 2022-02-01

## 2022-02-01 ENCOUNTER — HOSPITAL ENCOUNTER (OUTPATIENT)
Dept: INFUSION THERAPY | Age: 57
Discharge: HOME | End: 2022-02-01
Payer: COMMERCIAL

## 2022-02-01 ENCOUNTER — APPOINTMENT (OUTPATIENT)
Dept: ONCOLOGY | Age: 57
End: 2022-02-01

## 2022-02-01 VITALS
OXYGEN SATURATION: 99 % | TEMPERATURE: 98.2 F | WEIGHT: 203 LBS | HEIGHT: 66 IN | RESPIRATION RATE: 20 BRPM | SYSTOLIC BLOOD PRESSURE: 137 MMHG | DIASTOLIC BLOOD PRESSURE: 87 MMHG | HEART RATE: 71 BPM | BODY MASS INDEX: 32.62 KG/M2

## 2022-02-01 VITALS
TEMPERATURE: 97.5 F | SYSTOLIC BLOOD PRESSURE: 144 MMHG | DIASTOLIC BLOOD PRESSURE: 79 MMHG | OXYGEN SATURATION: 95 % | HEART RATE: 76 BPM | RESPIRATION RATE: 20 BRPM

## 2022-02-01 DIAGNOSIS — E78.00 HYPERCHOLESTEROLEMIA: ICD-10-CM

## 2022-02-01 DIAGNOSIS — F39 MOOD DISORDER (HCC): ICD-10-CM

## 2022-02-01 DIAGNOSIS — Z12.11 SCREEN FOR COLON CANCER: ICD-10-CM

## 2022-02-01 DIAGNOSIS — R23.2 HOT FLASHES: ICD-10-CM

## 2022-02-01 DIAGNOSIS — I10 ESSENTIAL HYPERTENSION: ICD-10-CM

## 2022-02-01 DIAGNOSIS — G47.00 INSOMNIA, UNSPECIFIED TYPE: ICD-10-CM

## 2022-02-01 DIAGNOSIS — E11.9 TYPE 2 DIABETES MELLITUS WITHOUT COMPLICATION, WITHOUT LONG-TERM CURRENT USE OF INSULIN (HCC): Primary | ICD-10-CM

## 2022-02-01 DIAGNOSIS — D75.1 POLYCYTHEMIA: ICD-10-CM

## 2022-02-01 LAB
ALBUMIN SERPL-MCNC: 3.6 G/DL (ref 3.4–5)
ALBUMIN/GLOB SERPL: 1.2 {RATIO} (ref 0.8–1.7)
ALP SERPL-CCNC: 90 U/L (ref 45–117)
ALT SERPL-CCNC: 38 U/L (ref 13–56)
ANION GAP SERPL CALC-SCNC: 4 MMOL/L (ref 3–18)
AST SERPL-CCNC: 26 U/L (ref 10–38)
BASO+EOS+MONOS # BLD AUTO: 0.8 K/UL (ref 0–2.3)
BASO+EOS+MONOS NFR BLD AUTO: 10 % (ref 0.1–17)
BILIRUB SERPL-MCNC: 0.7 MG/DL (ref 0.2–1)
BUN SERPL-MCNC: 12 MG/DL (ref 7–18)
BUN/CREAT SERPL: 16 (ref 12–20)
CALCIUM SERPL-MCNC: 9.3 MG/DL (ref 8.5–10.1)
CHLORIDE SERPL-SCNC: 102 MMOL/L (ref 100–111)
CO2 SERPL-SCNC: 31 MMOL/L (ref 21–32)
CREAT SERPL-MCNC: 0.76 MG/DL (ref 0.6–1.3)
DIFFERENTIAL METHOD BLD: NORMAL
ERYTHROCYTE [DISTWIDTH] IN BLOOD BY AUTOMATED COUNT: 13.4 % (ref 11.5–14.5)
GLOBULIN SER CALC-MCNC: 3 G/DL (ref 2–4)
GLUCOSE SERPL-MCNC: 330 MG/DL (ref 74–99)
HBA1C MFR BLD HPLC: 11.1 %
HCT VFR BLD AUTO: 39.6 % (ref 36–48)
HGB BLD-MCNC: 12.4 G/DL (ref 12–16)
LYMPHOCYTES # BLD: 2.6 K/UL (ref 1.1–5.9)
LYMPHOCYTES NFR BLD: 32 % (ref 14–44)
MCH RBC QN AUTO: 28.3 PG (ref 25–35)
MCHC RBC AUTO-ENTMCNC: 31.3 G/DL (ref 31–37)
MCV RBC AUTO: 90.4 FL (ref 78–102)
NEUTS SEG # BLD: 4.8 K/UL (ref 1.8–9.5)
NEUTS SEG NFR BLD: 59 % (ref 40–70)
PLATELET # BLD AUTO: 226 K/UL (ref 140–440)
POTASSIUM SERPL-SCNC: 4.5 MMOL/L (ref 3.5–5.5)
PROT SERPL-MCNC: 6.6 G/DL (ref 6.4–8.2)
RBC # BLD AUTO: 4.38 M/UL (ref 4.1–5.1)
SODIUM SERPL-SCNC: 137 MMOL/L (ref 136–145)
WBC # BLD AUTO: 8.2 K/UL (ref 4.5–13)

## 2022-02-01 PROCEDURE — 99214 OFFICE O/P EST MOD 30 MIN: CPT | Performed by: FAMILY MEDICINE

## 2022-02-01 PROCEDURE — 36415 COLL VENOUS BLD VENIPUNCTURE: CPT

## 2022-02-01 PROCEDURE — 85025 COMPLETE CBC W/AUTO DIFF WBC: CPT

## 2022-02-01 PROCEDURE — 83036 HEMOGLOBIN GLYCOSYLATED A1C: CPT | Performed by: FAMILY MEDICINE

## 2022-02-01 PROCEDURE — 80053 COMPREHEN METABOLIC PANEL: CPT

## 2022-02-01 RX ORDER — SIMVASTATIN 40 MG/1
40 TABLET, FILM COATED ORAL
Qty: 90 TABLET | Refills: 1 | Status: SHIPPED | OUTPATIENT
Start: 2022-02-01 | End: 2022-08-21 | Stop reason: SDUPTHER

## 2022-02-01 RX ORDER — METFORMIN HYDROCHLORIDE 1000 MG/1
1000 TABLET ORAL 2 TIMES DAILY WITH MEALS
Qty: 180 TABLET | Refills: 1 | Status: SHIPPED | OUTPATIENT
Start: 2022-02-01 | End: 2022-09-22 | Stop reason: SDUPTHER

## 2022-02-01 RX ORDER — LOSARTAN POTASSIUM 25 MG/1
25 TABLET ORAL DAILY
Qty: 90 TABLET | Refills: 1 | Status: SHIPPED | OUTPATIENT
Start: 2022-02-01 | End: 2022-04-18

## 2022-02-01 RX ORDER — VENLAFAXINE 37.5 MG/1
75 TABLET ORAL DAILY
Qty: 180 TABLET | Refills: 1 | Status: SHIPPED | OUTPATIENT
Start: 2022-02-01 | End: 2022-02-01 | Stop reason: DRUGHIGH

## 2022-02-01 RX ORDER — ZOLPIDEM TARTRATE 10 MG/1
TABLET ORAL
Qty: 30 TABLET | Refills: 2 | Status: SHIPPED | OUTPATIENT
Start: 2022-02-01 | End: 2022-02-03 | Stop reason: SDUPTHER

## 2022-02-01 RX ORDER — VENLAFAXINE HYDROCHLORIDE 150 MG/1
150 CAPSULE, EXTENDED RELEASE ORAL DAILY
Qty: 90 CAPSULE | Refills: 1 | Status: SHIPPED | OUTPATIENT
Start: 2022-02-01 | End: 2022-08-18

## 2022-02-01 NOTE — TELEPHONE ENCOUNTER
[address] Brooke is requesting the directions of the medication Ambien for this pt.  Please follow up when available to discuss

## 2022-02-01 NOTE — TELEPHONE ENCOUNTER
Pt called because she wants to know if Dr. Esther Johnson is going to up her sugar medicine or if she is going on insulin. Ms. Deepthicady Hyatt can be reached at [phone]. Please advise.  Thank you!!!

## 2022-02-01 NOTE — PROGRESS NOTES
MEHUL Messina comes in for follow-up care. Diabetes mellitus type 2: Patient has type 2 diabetes mellitus. She is on Metformin. Needs a refill of medication. Blood glucose numbers have been fluctuating. We will check a HbA1c today. Hypertension: Patient has hypertension. Blood pressure is stable. She takes losartan. She will continue with the current treatment plan. Mood disorder: Patient has mood disorder with anxiety and depression. Has been getting more anxious lately and is acutely stressed especially at the workplace. She has been on venlafaxine 75 mg daily. We will go up to 150 mg extended release daily. We will follow-up at next visit. Menopausal symptoms: Patient has menopausal symptoms. She is on venlafaxine. We will adjust the dose. Insomnia: Patient has insomnia. She is on Ambien. Needs a refill of medication. Prescription is sent in. Neuropathy: Patient has neuropathy. Gets numbness and tingling of the hands and the feet. She is on gabapentin. I will send in a refill of medication. Pedal edema: Patient has pedal edema. She uses furosemide only as needed when the lower extremity edema is noted. Currently no edema. Dyslipidemia: Patient has dyslipidemia. She is on simvastatin. We will recheck lipid panel. Obesity: Patient has a BMI of 32.77. She will intensify lifestyle and dietary modification. Health maintenance: Patient will be referred for colon cancer screening. Past Medical History  Past Medical History:   Diagnosis Date    BRCA1 positive 2012    Breast cancer ([facility] [address] 2013    right breast    Diabetes ([facility] [address]     History of chemotherapy 2013    breast Ca    Hypercholesterolemia     Hypertension     Insomnia     Menopause 2012    chemo put her in menopause    Obesity (BMI 30.0-34. 9)     UTI (urinary tract infection)        Surgical History  Past Surgical History:   Procedure Laterality Date    HX BREAST BIOPSY      Rt and Left    HX CARPAL TUNNEL RELEASE Bilateral     HX  SECTION      HX CHOLECYSTECTOMY      HX HYSTERECTOMY  2009    partial    HX KNEE ARTHROSCOPY Bilateral     HX MASTECTOMY Right 2013    HX TUBAL LIGATION          Medications  Current Outpatient Medications   Medication Sig Dispense Refill    gabapentin (NEURONTIN) 100 mg capsule TAKE 2 CAPSULES BY MOUTH IN THE MORNING AND 1 CAP IN THE AFTERNOON AND 1 CAPSULE AT BEDTIME 90 Capsule 0    meloxicam (MOBIC) 15 mg tablet Take 1 tablet by mouth once daily 30 Tablet 2    zolpidem (AMBIEN) 10 mg tablet TAKE 1 TABLET BY MOUTH NIGHTLY AS NEEDED FOR SLEEP 30 Tablet 2    losartan (COZAAR) 25 mg tablet Take 1 tablet by mouth once daily 30 Tablet 3    venlafaxine (EFFEXOR) 37.5 mg tablet Take 2 Tablets by mouth daily. 180 Tablet 1    simvastatin (ZOCOR) 40 mg tablet Take 1 Tablet by mouth nightly. 90 Tablet 1    metFORMIN (GLUCOPHAGE) 1,000 mg tablet Take 1 Tablet by mouth two (2) times daily (with meals). 180 Tablet 1    furosemide (Lasix) 20 mg tablet Take 1 Tablet by mouth daily as needed for PRN Indication (Other) (severe swelling).  90 Tablet 1       Allergies  Allergies   Allergen Reactions    Aspirin Swelling    Codeine Swelling    Penicillins Swelling and Hives       Family History  Family History   Problem Relation Age of Onset    COPD Mother    [facility] Breast Cancer Mother 50    Alcohol abuse Father     Diabetes Father     Hypertension Father     Heart Disease Father     Elevated Lipids Father     Hypertension Brother     Hypertension Maternal Grandmother     Hypertension Maternal Grandfather        Social History  Social History     Socioeconomic History    Marital status:      Spouse name: Not on file    Number of children: Not on file    Years of education: Not on file    Highest education level: Not on file   Occupational History    Not on file   Tobacco Use    Smoking status: Current Every Day Smoker     Packs/day: 1.00    Smokeless tobacco: Never Used   Vaping Use    Vaping Use: Never used   Substance and Sexual Activity    Alcohol use: Not Currently     Comment: occassionl    Drug use: No    Sexual activity: Not Currently     Partners: Male   Other Topics Concern    Not on file   Social History Narrative    Not on file     Social Determinants of Health     Financial Resource Strain:     Difficulty of Paying Living Expenses: Not on file   Food Insecurity:     Worried About Running Out of Food in the Last Year: Not on file    Celeste of Food in the Last Year: Not on file   Transportation Needs:     Lack of Transportation (Medical): Not on file    Lack of Transportation (Non-Medical): Not on file   Physical Activity:     Days of Exercise per Week: Not on file    Minutes of Exercise per Session: Not on file   Stress:     Feeling of Stress : Not on file   Social Connections:     Frequency of Communication with Friends and Family: Not on file    Frequency of Social Gatherings with Friends and Family: Not on file    Attends Hindu Services: Not on file    Active Member of [address] [place] [address] or Organizations: Not on file    Attends Club or Organization Meetings: Not on file    Marital Status: Not on file   Intimate Partner Violence:     Fear of Current or Ex-Partner: Not on file    Emotionally Abused: Not on file    Physically Abused: Not on file    Sexually Abused: Not on file   Housing Stability:     Unable to Pay for Housing in the Last Year: Not on file    Number of [place] in the Last Year: Not on file    Unstable Housing in the Last Year: Not on file       Review of Systems  Review of Systems - Review of all systems is negative except as noted above in the HPI.     Vital Signs  Visit Vitals  /87 (BP 1 Location: Left upper arm, BP Patient Position: Sitting, BP Cuff Size: Adult)   Pulse 71   Temp 98.2 °F (36.8 °C) (Oral)   Resp 20   Ht 5' 6\" (1.676 m)   Wt 203 lb (92.1 kg)   SpO2 99%   BMI 32.77 kg/m²         Physical Exam  Physical Examination: General appearance - alert, well appearing, and in no distress, oriented to person, place, and time, overweight and acyanotic, in no respiratory distress  Mental status - alert, oriented to person, place, and time, affect appropriate to mood  Neck - supple, no significant adenopathy  Lymphatics - no palpable lymphadenopathy, no hepatosplenomegaly  Chest - no tachypnea, retractions or cyanosis  Heart - S1 and S2 normal  Abdomen - no rebound tenderness noted  Back exam - limited range of motion  Neurological - normal muscle tone, no tremors, strength 5/5  Musculoskeletal - osteoarthritic changes noted in both hands  Extremities - intact peripheral pulses      Results  Results for orders placed or performed during the hospital encounter of 01/13/22   CBC WITH 3 PART DIFF   Result Value Ref Range    WBC 9.1 4.5 - 13.0 K/uL    RBC 4.88 4.10 - 5.10 M/uL    HGB 14.4 12.0 - 16 g/dL    HCT 44.6 36 - 48 %    MCV 91.4 78 - 102 FL    MCH 29.5 25.0 - 35.0 PG    MCHC 32.3 31 - 37 g/dL    RDW 13.7 11.5 - 14.5 %    PLATELET 625 415 - 396 K/uL    NEUTROPHILS 55 40 - 70 %    MIXED CELLS 11 0.1 - 17 %    LYMPHOCYTES 34 14 - 44 %    ABS. NEUTROPHILS 5.0 1.8 - 9.5 K/UL    ABS. MIXED CELLS 1.0 0.0 - 2.3 K/uL    ABS. LYMPHOCYTES 3.1 1.1 - 5.9 K/UL    DF AUTOMATED         ASSESSMENT and PLAN    ICD-10-CM ICD-9-CM    1. Type 2 diabetes mellitus without complication, without long-term current use of insulin (HCC)  E11.9 250.00 AMB POC HEMOGLOBIN A1C      metFORMIN (GLUCOPHAGE) 1,000 mg tablet   2. Essential hypertension  I10 401.9 losartan (COZAAR) 25 mg tablet      METABOLIC PANEL, COMPREHENSIVE      CBC WITH AUTOMATED DIFF   3. Hypercholesterolemia  E78.00 272.0 simvastatin (ZOCOR) 40 mg tablet      LIPID PANEL   4. Hot flashes  R23.2 782.62 DISCONTINUED: venlafaxine (EFFEXOR) 37.5 mg tablet   5. Screen for colon cancer  Z12.11 V76.51 REFERRAL TO GASTROENTEROLOGY   6.  Insomnia, unspecified type  G47.00 780.52 zolpidem (AMBIEN) 10 mg tablet   7. Polycythemia  D75.1 238.4    8. Mood disorder ([facility])  F39 296.90 venlafaxine-SR (EFFEXOR-XR) 150 mg capsule     lab results and schedule of future lab studies reviewed with patient  reviewed diet, exercise and weight control  cardiovascular risk and specific lipid/LDL goals reviewed  reviewed medications and side effects in detail  specific diabetic recommendations: low cholesterol diet, weight control and daily exercise discussed, home glucose monitoring emphasized, all medications, side effects and compliance discussed carefully, annual eye examinations at Ophthalmology discussed, glycohemoglobin and other lab monitoring discussed and long term diabetic complications discussed      I have discussed the diagnosis with the patient and the intended plan of care as seen in the above orders. The patient has received an after-visit summary and questions were answered concerning future plans. I have discussed medication, side effects, and warnings with the patient in detail. The patient verbalized understanding and is in agreement with the plan of care. The patient will contact the office with any additional concerns. Michaela Hicks MD    PLEASE NOTE:   This document has been produced using voice recognition software.  Unrecognized errors in transcription may be present

## 2022-02-01 NOTE — PROGRESS NOTES
[address] Lab Visit:    Latrell Montana  1965  [id number]    [id number] Pt arrived ambulatory w/o assist. Labs drawn per order via Left AC venipuncture x1 attempt. Pt tolerated well without complaints. Gauze/ coban to site. Pt departed [facility] ambulatory and in no distress at 1325.      Visit Vitals  BP (!) 144/79 (BP 1 Location: Left upper arm, BP Patient Position: Sitting)   Pulse 76   Temp 97.5 °F (36.4 °C)   Resp 20   SpO2 95%

## 2022-02-01 NOTE — PROGRESS NOTES
Chief Complaint   Patient presents with    Follow Up Chronic Condition     1. \"Have you been to the ER, urgent care clinic since your last visit? Hospitalized since your last visit? \" No    2. \"Have you seen or consulted any other health care providers outside of the [address] [place] [address] since your last visit? \" No     3. For patients aged 39-70: Has the patient had a colonoscopy / FIT/ Cologuard? No      If the patient is female:    4. For patients aged 41-77: Has the patient had a mammogram within the past 2 years? Yes - no Care Gap present      5. For patients aged 21-65: Has the patient had a pap smear?  Yes - no Care Gap present

## 2022-02-02 ENCOUNTER — TELEPHONE (OUTPATIENT)
Dept: FAMILY MEDICINE CLINIC | Age: 57
End: 2022-02-02

## 2022-02-02 DIAGNOSIS — E11.9 TYPE 2 DIABETES MELLITUS WITHOUT COMPLICATION, WITHOUT LONG-TERM CURRENT USE OF INSULIN (HCC): Primary | ICD-10-CM

## 2022-02-02 RX ORDER — GLIPIZIDE 10 MG/1
10 TABLET ORAL 2 TIMES DAILY
Qty: 60 TABLET | Refills: 2 | Status: SHIPPED | OUTPATIENT
Start: 2022-02-02 | End: 2022-05-16

## 2022-02-03 DIAGNOSIS — G47.00 INSOMNIA, UNSPECIFIED TYPE: ICD-10-CM

## 2022-02-03 RX ORDER — ZOLPIDEM TARTRATE 10 MG/1
10 TABLET ORAL
Qty: 30 TABLET | Refills: 2 | Status: SHIPPED | OUTPATIENT
Start: 2022-02-03 | End: 2022-05-16

## 2022-02-03 NOTE — TELEPHONE ENCOUNTER
Please let patient now hba1c has gone up to 11.7. this indicates her diabetes is uncontrolled and become worse. I have referred her to the endocrinologist. Please give her the number to the endocrinologist so that she can call to set up appointment. I will send in glipizide to take 10 mg 2 x day with meals in addition to the metformin. She should keep blood glucose log 2 x day and follow up in the clinic in 1 month.   Kristi Carnes MD

## 2022-02-03 NOTE — PROGRESS NOTES
Please let patient know her blood glucose level is elevated. Her HbA1c is elevated at 11.1. I will send in glipizide to take twice a day. She should continue with the Metformin twice a day. I will refer her to see the endocrinologist. She should keep a blood glucose log and follow-up with me in the clinic in 1 month. If her numbers remain elevated she may need to take insulin.   Deniz Marin MD

## 2022-02-08 ENCOUNTER — VIRTUAL VISIT (OUTPATIENT)
Dept: ONCOLOGY | Age: 57
End: 2022-02-08
Payer: COMMERCIAL

## 2022-02-08 DIAGNOSIS — D75.1 SECONDARY POLYCYTHEMIA: Primary | ICD-10-CM

## 2022-02-08 PROCEDURE — 99213 OFFICE O/P EST LOW 20 MIN: CPT | Performed by: NURSE PRACTITIONER

## 2022-02-08 NOTE — PROGRESS NOTES
Nishant Mauro is a 64 y.o. female, evaluated via audio-only technology on 2/8/2022 for Polycythemia. Assessment & Plan:   Polycythemia  --The patient has a past medical history significant of uncontrolled diabetes mellitus, right breast cancer (Dx 2013) BRCA1 positive, obesity. --8/25/2020 CBC reported hemoglobin was of 16.2%, hematocrit was 48.1%. Total WBC was 7.6 and platelet was 293,221. --Lab reviews indicated chronic polycythemia since at least 9/3/2019, hematocrit was 47.6%. --She is an active smoker, 1-1/2 PPD for years. Today she reports she has decreased it to 1/2ppd.  --Erythropoietin 11.5, WNL; elevated Carbon monoxide; NEGATIVE JAK2/MPL/CALR mutations, BCR/ABL1. Abd US reviewed. --Her polycythemia was likely a secondary process, smoking related. She is still active smoking. --Clinical stable. Recent lab reviewed with the patient.  --02/01/2022 CBC showed WBC 8.2K/uL, hemoglobin 12.4g/dL with hematocrit of 39.6%. Platelet 173. Plan:  --She is on monthly phlebotomy schedule currently while she is working on smoking cessation. --Patient is allergic to ASA.   --F/u PCP for smoking cessation. --We will see the patient back in clinic in about 4 months. Always sooner if required. History of breast cancer  --She has a past medical history significant of right breast cancer (Dx 2013, S.p chemotherapy at [facility]) BRCA1 positive, obesity. --She has f/u with PCP for annual mammogram as scheduled. Follow up in 4 months with repeat labs or sooner if indicated. I spent 15 minutes on this visit with this established patient. Subjective:   Ms. Nishant Mauro is a 64 y.o. female who was evaluated via audio-only technology for Polycythemia. Today she states she has been doing well since her last office visit denies fevers, infections, skin rash and lymphadenopathy. Denies abdominal pain, nausea, vomiting, diarrhea, or constipation. She denies fatigue, shortness of breath, and weakness. She denies chest pains or dizziness. She denies bruising and bleeding. She denies pain or any discomfort. She does not have any concerns or complaints to report at this time. Prior to Admission medications    Medication Sig Start Date End Date Taking? Authorizing Provider   zolpidem (AMBIEN) 10 mg tablet Take 1 Tablet by mouth nightly as needed for Sleep. Max Daily Amount: 10 mg. insomnia 2/3/22   Julian Vivas MD   glipiZIDE (GLUCOTROL) 10 mg tablet Take 1 Tablet by mouth two (2) times a day. 2/2/22   Julian Vivas MD   metFORMIN (GLUCOPHAGE) 1,000 mg tablet Take 1 Tablet by mouth two (2) times daily (with meals). 2/1/22   Julian Vivas MD   simvastatin (ZOCOR) 40 mg tablet Take 1 Tablet by mouth nightly. 2/1/22   Julian Vivas MD   losartan (COZAAR) 25 mg tablet Take 1 Tablet by mouth daily. 2/1/22   Julian Vivas MD   venlafaxine-SR (EFFEXOR-XR) 150 mg capsule Take 1 Capsule by mouth daily. 2/1/22   Julian Vivas MD   gabapentin (NEURONTIN) 100 mg capsule TAKE 2 CAPSULES BY MOUTH IN THE MORNING AND 1 CAP IN THE AFTERNOON AND 1 CAPSULE AT BEDTIME 1/20/22   Julian Vivas MD   meloxicam (MOBIC) 15 mg tablet Take 1 tablet by mouth once daily 11/17/21   Julian Vivas MD   furosemide (Lasix) 20 mg tablet Take 1 Tablet by mouth daily as needed for PRN Indication (Other) (severe swelling). 9/30/21   Julian Vivas MD         Review of Systems   Constitutional: Negative for chills, diaphoresis, fever, malaise/fatigue and weight loss. Respiratory: Negative for cough, hemoptysis, shortness of breath and wheezing. Cardiovascular: Negative for chest pain, palpitations and leg swelling. Gastrointestinal: Negative for abdominal pain, diarrhea, heartburn, nausea and vomiting. Genitourinary: Negative for dysuria, frequency, hematuria and urgency. Musculoskeletal: Negative for joint pain and myalgias. Skin: Negative for itching and rash.    Neurological: Negative for dizziness, seizures, weakness and headaches. Psychiatric/Behavioral: Negative for depression. The patient does not have insomnia. Patient-Reported Vitals 8/3/2021   Patient-Reported Weight 213lbs     LAB DATA:    Lab Results   Component Value Date/Time    WBC 8.2 02/01/2022 01:20 PM    HGB 12.4 02/01/2022 01:20 PM    HCT 39.6 02/01/2022 01:20 PM    PLATELET 279 37/57/6050 01:20 PM    MCV 90.4 02/01/2022 01:20 PM     Lab Results   Component Value Date/Time    Sodium 137 02/01/2022 01:45 PM    Potassium 4.5 02/01/2022 01:45 PM    Chloride 102 02/01/2022 01:45 PM    CO2 31 02/01/2022 01:45 PM    Anion gap 4 02/01/2022 01:45 PM    Glucose 330 (H) 02/01/2022 01:45 PM    BUN 12 02/01/2022 01:45 PM    Creatinine 0.76 02/01/2022 01:45 PM    BUN/Creatinine ratio 16 02/01/2022 01:45 PM    GFR est AA >60 02/01/2022 01:45 PM    GFR est non-AA >60 02/01/2022 01:45 PM    Calcium 9.3 02/01/2022 01:45 PM    Bilirubin, total 0.7 02/01/2022 01:45 PM    Alk. phosphatase 90 02/01/2022 01:45 PM    Protein, total 6.6 02/01/2022 01:45 PM    Albumin 3.6 02/01/2022 01:45 PM    Globulin 3.0 02/01/2022 01:45 PM    A-G Ratio 1.2 02/01/2022 01:45 PM    ALT (SGPT) 38 02/01/2022 01:45 PM    AST (SGOT) 26 02/01/2022 01:45 PM         Shiv Cheema, who was evaluated through a patient-initiated, synchronous (real-time) audio only encounter, and/or her healthcare decision maker, is aware that it is a billable service, with coverage as determined by her insurance carrier. She provided verbal consent to proceed: Yes. She has not had a related appointment within my department in the past 7 days or scheduled within the next 24 hours. Follow up in 4 months with repeat labs or sooner if indicated. I spent 15 minutes on this visit with this established patient.      Orders Placed This Encounter    CBC WITH AUTOMATED DIFF     Standing Status:   Future     Standing Expiration Date:   5/2/2033    METABOLIC PANEL, COMPREHENSIVE     Standing Status: Future     Standing Expiration Date:   2/9/2023       Mariama Ellsworth DNP, FNP-C  02/08/22      CC: Nneka Tadeo MD

## 2022-03-01 ENCOUNTER — APPOINTMENT (OUTPATIENT)
Dept: INFUSION THERAPY | Age: 57
End: 2022-03-01

## 2022-03-08 ENCOUNTER — HOSPITAL ENCOUNTER (OUTPATIENT)
Dept: INFUSION THERAPY | Age: 57
Discharge: HOME | End: 2022-03-08
Payer: COMMERCIAL

## 2022-03-08 VITALS
SYSTOLIC BLOOD PRESSURE: 165 MMHG | DIASTOLIC BLOOD PRESSURE: 82 MMHG | TEMPERATURE: 97.6 F | OXYGEN SATURATION: 96 % | RESPIRATION RATE: 16 BRPM | HEART RATE: 74 BPM

## 2022-03-08 LAB
ALBUMIN SERPL-MCNC: 3.5 G/DL (ref 3.4–5)
ALBUMIN/GLOB SERPL: 1.1 {RATIO} (ref 0.8–1.7)
ALP SERPL-CCNC: 87 U/L (ref 45–117)
ALT SERPL-CCNC: 38 U/L (ref 13–56)
ANION GAP SERPL CALC-SCNC: 5 MMOL/L (ref 3–18)
AST SERPL-CCNC: 22 U/L (ref 10–38)
BASO+EOS+MONOS # BLD AUTO: 0.4 K/UL (ref 0–2.3)
BASO+EOS+MONOS NFR BLD AUTO: 5 % (ref 0.1–17)
BILIRUB SERPL-MCNC: 0.3 MG/DL (ref 0.2–1)
BUN SERPL-MCNC: 11 MG/DL (ref 7–18)
BUN/CREAT SERPL: 16 (ref 12–20)
CALCIUM SERPL-MCNC: 9.5 MG/DL (ref 8.5–10.1)
CHLORIDE SERPL-SCNC: 102 MMOL/L (ref 100–111)
CO2 SERPL-SCNC: 30 MMOL/L (ref 21–32)
CREAT SERPL-MCNC: 0.68 MG/DL (ref 0.6–1.3)
DIFFERENTIAL METHOD BLD: ABNORMAL
ERYTHROCYTE [DISTWIDTH] IN BLOOD BY AUTOMATED COUNT: 14.8 % (ref 11.5–14.5)
GLOBULIN SER CALC-MCNC: 3.3 G/DL (ref 2–4)
GLUCOSE SERPL-MCNC: 177 MG/DL (ref 74–99)
HCT VFR BLD AUTO: 43.8 % (ref 36–48)
HGB BLD-MCNC: 13.9 G/DL (ref 12–16)
LYMPHOCYTES # BLD: 3 K/UL (ref 1.1–5.9)
LYMPHOCYTES NFR BLD: 36 % (ref 14–44)
MCH RBC QN AUTO: 28.3 PG (ref 25–35)
MCHC RBC AUTO-ENTMCNC: 31.7 G/DL (ref 31–37)
MCV RBC AUTO: 89 FL (ref 78–102)
NEUTS SEG # BLD: 5.1 K/UL (ref 1.8–9.5)
NEUTS SEG NFR BLD: 59 % (ref 40–70)
PLATELET # BLD AUTO: 204 K/UL (ref 140–440)
POTASSIUM SERPL-SCNC: 4.2 MMOL/L (ref 3.5–5.5)
PROT SERPL-MCNC: 6.8 G/DL (ref 6.4–8.2)
RBC # BLD AUTO: 4.92 M/UL (ref 4.1–5.1)
SODIUM SERPL-SCNC: 137 MMOL/L (ref 136–145)
WBC # BLD AUTO: 8.5 K/UL (ref 4.5–13)

## 2022-03-08 PROCEDURE — 80053 COMPREHEN METABOLIC PANEL: CPT

## 2022-03-08 PROCEDURE — 36415 COLL VENOUS BLD VENIPUNCTURE: CPT

## 2022-03-08 PROCEDURE — 85025 COMPLETE CBC W/AUTO DIFF WBC: CPT

## 2022-03-08 NOTE — PROGRESS NOTES
[facility] Progress Note    Date: 2022    Name: Britta Arreola    MRN: [id number]         : 1965    CBC/Therapeutic Phlebotomy (Monlthy Status)    Ms. Poppy Huerta arrived to [facility] at [phone]. Ms. Poppy Huerta was assessed and education was provided. Ms. Eddy's vitals were reviewed. Visit Vitals  BP (!) 165/82 (BP 1 Location: Left upper arm, BP Patient Position: Sitting)   Pulse 74   Temp 97.6 °F (36.4 °C)   Resp 16   SpO2 96%       Blood drawn for labs via LAC. Gauze/coban applied. CBC processed on site and CMP sent out for processing. Lab results were obtained and reviewed. Recent Results (from the past 12 hour(s))   CBC WITH 3 PART DIFF    Collection Time: 22  2:28 PM   Result Value Ref Range    WBC 8.5 4.5 - 13.0 K/uL    RBC 4.92 4.10 - 5.10 M/uL    HGB 13.9 12.0 - 16 g/dL    HCT 43.8 36 - 48 %    MCV 89.0 78 - 102 FL    MCH 28.3 25.0 - 35.0 PG    MCHC 31.7 31 - 37 g/dL    RDW 14.8 (H) 11.5 - 14.5 %    PLATELET 731 334 - 821 K/uL    NEUTROPHILS 59 40 - 70 %    Mixed cells 5 0.1 - 17 %    LYMPHOCYTES 36 14 - 44 %    ABS. NEUTROPHILS 5.1 1.8 - 9.5 K/UL    ABS. MIXED CELLS 0.4 0.0 - 2.3 K/uL    ABS. LYMPHOCYTES 3.0 1.1 - 5.9 K/UL    DF AUTOMATED       Therapeutic Phlebotomy HELD today per orders for HCT<45. Ms. Poppy Huerta tolerated well without complaints. Discharge/ follow-up instructions discussed w/ pt. Pt verbalized understanding. Pt armband removed & shredded. Ms. Poppy Huerta was discharged from [place] [address] in stable condition at 1440. She is to return on 22 at 1400 for her next appointment.     Chelsie Morrell RN  2022

## 2022-03-19 PROBLEM — E66.01 SEVERE OBESITY (HCC): Status: ACTIVE | Noted: 2019-07-30

## 2022-03-19 PROBLEM — M17.9 KNEE OSTEOARTHRITIS: Status: ACTIVE | Noted: 2021-10-14

## 2022-03-19 PROBLEM — Z80.3 FAMILY HISTORY OF BREAST CANCER: Status: ACTIVE | Noted: 2021-07-06

## 2022-04-02 DIAGNOSIS — Z85.3 HISTORY OF RIGHT BREAST CANCER: ICD-10-CM

## 2022-04-02 DIAGNOSIS — M17.0 PRIMARY OSTEOARTHRITIS OF BOTH KNEES: ICD-10-CM

## 2022-04-04 RX ORDER — MELOXICAM 15 MG/1
TABLET ORAL
Qty: 30 TABLET | Refills: 0 | Status: SHIPPED | OUTPATIENT
Start: 2022-04-04 | End: 2022-05-16

## 2022-04-04 RX ORDER — GABAPENTIN 100 MG/1
CAPSULE ORAL
Qty: 90 CAPSULE | Refills: 0 | Status: SHIPPED | OUTPATIENT
Start: 2022-04-04 | End: 2022-05-18

## 2022-04-05 ENCOUNTER — HOSPITAL ENCOUNTER (OUTPATIENT)
Dept: INFUSION THERAPY | Age: 57
Discharge: HOME | End: 2022-04-05
Payer: COMMERCIAL

## 2022-04-05 VITALS
HEART RATE: 84 BPM | TEMPERATURE: 98.2 F | DIASTOLIC BLOOD PRESSURE: 74 MMHG | SYSTOLIC BLOOD PRESSURE: 151 MMHG | RESPIRATION RATE: 16 BRPM | OXYGEN SATURATION: 95 %

## 2022-04-05 LAB
BASO+EOS+MONOS # BLD AUTO: 0.7 K/UL (ref 0–2.3)
BASO+EOS+MONOS NFR BLD AUTO: 8 % (ref 0.1–17)
DIFFERENTIAL METHOD BLD: ABNORMAL
ERYTHROCYTE [DISTWIDTH] IN BLOOD BY AUTOMATED COUNT: 16.3 % (ref 11.5–14.5)
HCT VFR BLD AUTO: 43.7 % (ref 36–48)
HGB BLD-MCNC: 13.7 G/DL (ref 12–16)
LYMPHOCYTES # BLD: 2.6 K/UL (ref 1.1–5.9)
LYMPHOCYTES NFR BLD: 31 % (ref 14–44)
MCH RBC QN AUTO: 28 PG (ref 25–35)
MCHC RBC AUTO-ENTMCNC: 31.4 G/DL (ref 31–37)
MCV RBC AUTO: 89.2 FL (ref 78–102)
NEUTS SEG # BLD: 5.2 K/UL (ref 1.8–9.5)
NEUTS SEG NFR BLD: 61 % (ref 40–70)
PLATELET # BLD AUTO: 204 K/UL (ref 140–440)
RBC # BLD AUTO: 4.9 M/UL (ref 4.1–5.1)
WBC # BLD AUTO: 8.5 K/UL (ref 4.5–13)

## 2022-04-05 PROCEDURE — 36415 COLL VENOUS BLD VENIPUNCTURE: CPT

## 2022-04-05 PROCEDURE — 85025 COMPLETE CBC W/AUTO DIFF WBC: CPT

## 2022-04-14 DIAGNOSIS — I10 ESSENTIAL HYPERTENSION: ICD-10-CM

## 2022-04-18 RX ORDER — LOSARTAN POTASSIUM 25 MG/1
TABLET ORAL
Qty: 30 TABLET | Refills: 0 | Status: SHIPPED | OUTPATIENT
Start: 2022-04-18 | End: 2022-09-18 | Stop reason: SDUPTHER

## 2022-04-18 NOTE — TELEPHONE ENCOUNTER
Requested Prescriptions     Pending Prescriptions Disp Refills    losartan (COZAAR) 25 mg tablet [Pharmacy Med Name: Losartan Potassium 25 MG Oral Tablet] 30 Tablet 0     Sig: Take 1 tablet by mouth once daily       REFILL AUTHORIZATION REQUEST

## 2022-05-03 ENCOUNTER — HOSPITAL ENCOUNTER (OUTPATIENT)
Dept: INFUSION THERAPY | Age: 57
Discharge: HOME | End: 2022-05-03
Payer: COMMERCIAL

## 2022-05-03 VITALS
OXYGEN SATURATION: 97 % | DIASTOLIC BLOOD PRESSURE: 78 MMHG | HEART RATE: 77 BPM | SYSTOLIC BLOOD PRESSURE: 137 MMHG | RESPIRATION RATE: 16 BRPM | TEMPERATURE: 98.2 F

## 2022-05-03 LAB
BASO+EOS+MONOS # BLD AUTO: 0.7 K/UL (ref 0–2.3)
BASO+EOS+MONOS NFR BLD AUTO: 9 % (ref 0.1–17)
DIFFERENTIAL METHOD BLD: ABNORMAL
ERYTHROCYTE [DISTWIDTH] IN BLOOD BY AUTOMATED COUNT: 16.8 % (ref 11.5–14.5)
HCT VFR BLD AUTO: 43.8 % (ref 36–48)
HGB BLD-MCNC: 13.9 G/DL (ref 12–16)
LYMPHOCYTES # BLD: 3.1 K/UL (ref 1.1–5.9)
LYMPHOCYTES NFR BLD: 39 % (ref 14–44)
MCH RBC QN AUTO: 28.5 PG (ref 25–35)
MCHC RBC AUTO-ENTMCNC: 31.7 G/DL (ref 31–37)
MCV RBC AUTO: 89.9 FL (ref 78–102)
NEUTS SEG # BLD: 4.1 K/UL (ref 1.8–9.5)
NEUTS SEG NFR BLD: 52 % (ref 40–70)
PLATELET # BLD AUTO: 198 K/UL (ref 140–440)
RBC # BLD AUTO: 4.87 M/UL (ref 4.1–5.1)
WBC # BLD AUTO: 7.9 K/UL (ref 4.5–13)

## 2022-05-03 PROCEDURE — 36415 COLL VENOUS BLD VENIPUNCTURE: CPT

## 2022-05-03 PROCEDURE — 85025 COMPLETE CBC W/AUTO DIFF WBC: CPT

## 2022-05-03 NOTE — PROGRESS NOTES
[facility] Progress Note    Date: May 3, 2022    Name: Rosemary Gasca    MRN: [id number]         : 1965    CBC/Therapeutic Phlebotomy (Monlthy Status)    Ms. Audrey Vigil arrived to [facility] at [phone]. Ms. Audrey Vigil was assessed and education was provided. Ms. Eddy's vitals were reviewed. Visit Vitals  /78 (BP 1 Location: Left upper arm, BP Patient Position: Sitting)   Pulse 77   Temp 98.2 °F (36.8 °C)   Resp 16   SpO2 97%   Breastfeeding No       Blood drawn for labs via LAC. Gauze/coban applied. CBC processed on site. Lab results were obtained and reviewed. Recent Results (from the past 12 hour(s))   CBC WITH 3 PART DIFF    Collection Time: 22  2:00 PM   Result Value Ref Range    WBC 7.9 4.5 - 13.0 K/uL    RBC 4.87 4.10 - 5.10 M/uL    HGB 13.9 12.0 - 16 g/dL    HCT 43.8 36 - 48 %    MCV 89.9 78 - 102 FL    MCH 28.5 25.0 - 35.0 PG    MCHC 31.7 31 - 37 g/dL    RDW 16.8 (H) 11.5 - 14.5 %    PLATELET 321 671 - 012 K/uL    NEUTROPHILS 52 40 - 70 %    Mixed cells 9 0.1 - 17 %    LYMPHOCYTES 39 14 - 44 %    ABS. NEUTROPHILS 4.1 1.8 - 9.5 K/UL    ABS. MIXED CELLS 0.7 0.0 - 2.3 K/uL    ABS. LYMPHOCYTES 3.1 1.1 - 5.9 K/UL    DF AUTOMATED       Therapeutic Phlebotomy HELD today per orders for HCT<45. Ms. Audrey Vigil tolerated well without complaints. Discharge/ follow-up instructions discussed w/ pt. Pt verbalized understanding. Pt armband removed & shredded. Ms. Audrey Vigil was discharged from [place] [address] in stable condition at 1405. She is to return on 22 at 1400 for her next appointment.       Joshua Leger  May 3, 2022

## 2022-05-12 DIAGNOSIS — E11.9 TYPE 2 DIABETES MELLITUS WITHOUT COMPLICATION, WITHOUT LONG-TERM CURRENT USE OF INSULIN (HCC): ICD-10-CM

## 2022-05-12 DIAGNOSIS — G47.00 INSOMNIA, UNSPECIFIED TYPE: ICD-10-CM

## 2022-05-12 DIAGNOSIS — M17.0 PRIMARY OSTEOARTHRITIS OF BOTH KNEES: ICD-10-CM

## 2022-05-16 RX ORDER — MELOXICAM 15 MG/1
TABLET ORAL
Qty: 30 TABLET | Refills: 0 | Status: SHIPPED | OUTPATIENT
Start: 2022-05-16 | End: 2022-07-12

## 2022-05-16 RX ORDER — GLIPIZIDE 10 MG/1
TABLET ORAL
Qty: 60 TABLET | Refills: 0 | Status: SHIPPED | OUTPATIENT
Start: 2022-05-16 | End: 2022-08-21 | Stop reason: SDUPTHER

## 2022-05-16 RX ORDER — ZOLPIDEM TARTRATE 10 MG/1
TABLET ORAL
Qty: 30 TABLET | Refills: 0 | Status: SHIPPED | OUTPATIENT
Start: 2022-05-16 | End: 2022-06-20

## 2022-05-17 DIAGNOSIS — Z85.3 HISTORY OF RIGHT BREAST CANCER: ICD-10-CM

## 2022-05-18 RX ORDER — GABAPENTIN 100 MG/1
CAPSULE ORAL
Qty: 90 CAPSULE | Refills: 0 | Status: SHIPPED | OUTPATIENT
Start: 2022-05-18 | End: 2022-07-12

## 2022-05-31 ENCOUNTER — HOSPITAL ENCOUNTER (OUTPATIENT)
Dept: INFUSION THERAPY | Age: 57
End: 2022-05-31

## 2022-06-07 ENCOUNTER — APPOINTMENT (OUTPATIENT)
Dept: INFUSION THERAPY | Age: 57
End: 2022-06-07

## 2022-06-17 DIAGNOSIS — G47.00 INSOMNIA, UNSPECIFIED TYPE: ICD-10-CM

## 2022-06-17 NOTE — TELEPHONE ENCOUNTER
Requested Prescriptions     Pending Prescriptions Disp Refills    zolpidem (AMBIEN) 10 mg tablet [Pharmacy Med Name: Zolpidem Tartrate 10 MG Oral Tablet] 30 Tablet 0     Sig: TAKE 1 TABLET BY MOUTH AT BEDTIME AS NEEDED FOR SLEEP --  MAX  DAILY  AMOUNT  1  TAB  FOR  INSOMNIA

## 2022-06-17 NOTE — TELEPHONE ENCOUNTER
Requested Prescriptions     Pending Prescriptions Disp Refills    zolpidem (AMBIEN) 10 mg tablet [Pharmacy Med Name: Zolpidem Tartrate 10 MG Oral Tablet] 30 Tablet 0     Sig: TAKE 1 TABLET BY MOUTH AT BEDTIME AS NEEDED FOR SLEEP --  MAX  DAILY  AMOUNT  1  TAB  FOR  INSOMNIA     Angela Bliss called for their medication refill.     Last Office visit:  02-  Last labs:  05-  Follow up visit: no follow up scheduled   Last date prescribe 05-    Please Advise

## 2022-06-20 RX ORDER — ZOLPIDEM TARTRATE 10 MG/1
TABLET ORAL
Qty: 30 TABLET | Refills: 0 | Status: SHIPPED | OUTPATIENT
Start: 2022-06-20 | End: 2022-07-16 | Stop reason: SDUPTHER

## 2022-06-21 ENCOUNTER — HOSPITAL ENCOUNTER (OUTPATIENT)
Dept: INFUSION THERAPY | Age: 57
Discharge: HOME | End: 2022-06-21
Payer: COMMERCIAL

## 2022-06-21 VITALS
DIASTOLIC BLOOD PRESSURE: 75 MMHG | RESPIRATION RATE: 16 BRPM | OXYGEN SATURATION: 95 % | HEART RATE: 73 BPM | TEMPERATURE: 97.5 F | SYSTOLIC BLOOD PRESSURE: 139 MMHG

## 2022-06-21 LAB
BASO+EOS+MONOS # BLD AUTO: 0.6 K/UL (ref 0–2.3)
BASO+EOS+MONOS NFR BLD AUTO: 6 % (ref 0.1–17)
DIFFERENTIAL METHOD BLD: ABNORMAL
ERYTHROCYTE [DISTWIDTH] IN BLOOD BY AUTOMATED COUNT: 15.1 % (ref 11.5–14.5)
HCT VFR BLD AUTO: 45.3 % (ref 36–48)
HGB BLD-MCNC: 14.6 G/DL (ref 12–16)
LYMPHOCYTES # BLD: 2.9 K/UL (ref 1.1–5.9)
LYMPHOCYTES NFR BLD: 31 % (ref 14–44)
MCH RBC QN AUTO: 29.5 PG (ref 25–35)
MCHC RBC AUTO-ENTMCNC: 32.2 G/DL (ref 31–37)
MCV RBC AUTO: 91.5 FL (ref 78–102)
NEUTS SEG # BLD: 6 K/UL (ref 1.8–9.5)
NEUTS SEG NFR BLD: 63 % (ref 40–70)
PLATELET # BLD AUTO: 210 K/UL (ref 140–440)
RBC # BLD AUTO: 4.95 M/UL (ref 4.1–5.1)
WBC # BLD AUTO: 9.5 K/UL (ref 4.5–13)

## 2022-06-21 PROCEDURE — 99195 PHLEBOTOMY: CPT

## 2022-06-21 PROCEDURE — 85025 COMPLETE CBC W/AUTO DIFF WBC: CPT

## 2022-06-21 PROCEDURE — 36415 COLL VENOUS BLD VENIPUNCTURE: CPT

## 2022-06-21 PROCEDURE — 74011250636 HC RX REV CODE- 250/636: Performed by: INTERNAL MEDICINE

## 2022-06-21 RX ORDER — SODIUM CHLORIDE 9 MG/ML
999 INJECTION, SOLUTION INTRAVENOUS ONCE
Status: COMPLETED | OUTPATIENT
Start: 2022-06-21 | End: 2022-06-21

## 2022-06-21 RX ADMIN — SODIUM CHLORIDE 999 ML/HR: 0.9 INJECTION, SOLUTION INTRAVENOUS at 15:00

## 2022-06-21 NOTE — PROGRESS NOTES
[facility] Progress Note    Date: 2022    Name: Malgorzata Glynn    MRN: [id number]         : 1965    CBC/Therapeutic Phlebotomy (Monthly Status)    Ms. Liat Nicole arrived to [facility] at [phone]. Ms. Liat Nicole was assessed and education was provided. Ms. Eddy's vitals were reviewed. Visit Vitals  /75 (BP 1 Location: Left lower arm, BP Patient Position: Reclining)   Pulse 73   Temp 97.5 °F (36.4 °C)   Resp 16   SpO2 95%   Breastfeeding No       Blood for CBC drawn via LAC. Gauze applied. CBC processed on site. Lab results were obtained and reviewed. Recent Results (from the past 12 hour(s))   CBC WITH 3 PART DIFF    Collection Time: 22  2:31 PM   Result Value Ref Range    WBC 9.5 4.5 - 13.0 K/uL    RBC 4.95 4.10 - 5.10 M/uL    HGB 14.6 12.0 - 16 g/dL    HCT 45.3 36 - 48 %    MCV 91.5 78 - 102 FL    MCH 29.5 25.0 - 35.0 PG    MCHC 32.2 31 - 37 g/dL    RDW 15.1 (H) 11.5 - 14.5 %    PLATELET 698 694 - 361 K/uL    NEUTROPHILS 63 40 - 70 %    Mixed cells 6 0.1 - 17 %    LYMPHOCYTES 31 14 - 44 %    ABS. NEUTROPHILS 6.0 1.8 - 9.5 K/UL    ABS. MIXED CELLS 0.6 0.0 - 2.3 K/uL    ABS. LYMPHOCYTES 2.9 1.1 - 5.9 K/UL    DF AUTOMATED       Therapeutic Phlebotomy initiated today per orders for HCT> 45.    20g PIV inserted to LAC and TP initiated at 1440 and ended at 1500. Approximately 500cc blood removed, followed by 500cc NS hydration bolus. Ginger-charity/cookies provided during tx. Ms. Liat Nicole tolerated well without complaints. PIV removed. Gauze applied (pt declined coban). Discharge/ follow-up instructions discussed w/ pt. Pt verbalized understanding. Pt armband removed & shredded. Ms. Liat Nicole was discharged from [place] [address] in stable condition at 1540. She is to return on 22 at 1000 for her next appointment, for CBC/Therapeutic Phlebotomy (weekly status).     Jennifer Aguilar RN  2022

## 2022-06-30 ENCOUNTER — HOSPITAL ENCOUNTER (OUTPATIENT)
Dept: INFUSION THERAPY | Age: 57
Discharge: HOME | End: 2022-06-30
Payer: COMMERCIAL

## 2022-06-30 VITALS
SYSTOLIC BLOOD PRESSURE: 116 MMHG | TEMPERATURE: 98.8 F | DIASTOLIC BLOOD PRESSURE: 70 MMHG | OXYGEN SATURATION: 94 % | RESPIRATION RATE: 16 BRPM | HEART RATE: 67 BPM

## 2022-06-30 LAB
BASO+EOS+MONOS # BLD AUTO: 0.3 K/UL (ref 0–2.3)
BASO+EOS+MONOS NFR BLD AUTO: 5 % (ref 0.1–17)
DIFFERENTIAL METHOD BLD: ABNORMAL
ERYTHROCYTE [DISTWIDTH] IN BLOOD BY AUTOMATED COUNT: 14.9 % (ref 11.5–14.5)
HCT VFR BLD AUTO: 44.2 % (ref 36–48)
HGB BLD-MCNC: 14.4 G/DL (ref 12–16)
LYMPHOCYTES # BLD: 2.2 K/UL (ref 1.1–5.9)
LYMPHOCYTES NFR BLD: 30 % (ref 14–44)
MCH RBC QN AUTO: 30.2 PG (ref 25–35)
MCHC RBC AUTO-ENTMCNC: 32.6 G/DL (ref 31–37)
MCV RBC AUTO: 92.7 FL (ref 78–102)
NEUTS SEG # BLD: 5 K/UL (ref 1.8–9.5)
NEUTS SEG NFR BLD: 66 % (ref 40–70)
PLATELET # BLD AUTO: 228 K/UL (ref 140–440)
RBC # BLD AUTO: 4.77 M/UL (ref 4.1–5.1)
WBC # BLD AUTO: 7.5 K/UL (ref 4.5–13)

## 2022-06-30 PROCEDURE — 85025 COMPLETE CBC W/AUTO DIFF WBC: CPT

## 2022-06-30 PROCEDURE — 99195 PHLEBOTOMY: CPT

## 2022-06-30 PROCEDURE — 74011250636 HC RX REV CODE- 250/636: Performed by: INTERNAL MEDICINE

## 2022-06-30 PROCEDURE — 36415 COLL VENOUS BLD VENIPUNCTURE: CPT

## 2022-06-30 RX ORDER — SODIUM CHLORIDE 9 MG/ML
500 INJECTION, SOLUTION INTRAVENOUS ONCE
Status: COMPLETED | OUTPATIENT
Start: 2022-06-30 | End: 2022-06-30

## 2022-06-30 RX ADMIN — SODIUM CHLORIDE 500 ML: 0.9 INJECTION, SOLUTION INTRAVENOUS at 10:55

## 2022-06-30 NOTE — PROGRESS NOTES
[facility] Progress Note    Date: 2022    Name: Ventura Wilburn    MRN: [id number]         : 1965    Ms. Rylie Barrera arrived in the [facility] today at 1000 in stable condition, here for CBC/Phlebotomy (Weekly Status). She was assessed and education was provided. Verbal patient education reminder on Therapeutic Phlebotomy, and potential side effects & adverse reactions was provided, and Ms. Eddy verbalized understanding. Ms. Eddy's vitals were reviewed. Visit Vitals  /80 (BP 1 Location: Left upper arm, BP Patient Position: Sitting)   Pulse 71   Temp 98.8 °F (37.1 °C)   Resp 20   SpO2 94%         Blood for the ordered CBC was drawn from her left AC at 1005, without incident, and was processed on site. Lab results were obtained and reviewed, and the CBC results from today, were as follows:    Recent Results (from the past 12 hour(s))   CBC WITH 3 PART DIFF    Collection Time: 22 10:05 AM   Result Value Ref Range    WBC 7.5 4.5 - 13.0 K/uL    RBC 4.77 4.10 - 5.10 M/uL    HGB 14.4 12.0 - 16 g/dL    HCT 44.2 36 - 48 %    MCV 92.7 78 - 102 FL    MCH 30.2 25.0 - 35.0 PG    MCHC 32.6 31 - 37 g/dL    RDW 14.9 (H) 11.5 - 14.5 %    PLATELET 177 831 - 874 K/uL    NEUTROPHILS 66 40 - 70 %    Mixed cells 5 0.1 - 17 %    LYMPHOCYTES 30 14 - 44 %    ABS. NEUTROPHILS 5.0 1.8 - 9.5 K/UL    ABS. MIXED CELLS 0.3 0.0 - 2.3 K/uL    ABS. LYMPHOCYTES 2.2 1.1 - 5.9 K/UL    DF AUTOMATED             Phlebotomy was indicated today per order, for HCT > 42.0. (weekly status parameters)          PIV # 21 G was established in her left AC at 1030 without incident, and phlebotomy was started.  Snack was offered, but was politely declined.     Phlebotomy was completed without incident at 1055, after a total of 500 ml Blood was obtained, per order.      ml IV bolus was administered post phlebotomy, per order, and also without incident.          After completion of the IV NS Bolus, her PIV was removed and gauze/coban was applied. Ms. Tika Weinberg tolerated well and voiced no complaints. Ms. Tika Weinberg was discharged from [place] [address] in stable condition at 1135. Adrienne Elaine She is to return in 1 week, on next Friday, 7-8-22 at 1400, for her next appointment, for CBC/Phlebotomy (Weekly Status).      Radha Mark RN  June 30, 2022  10:00 AM

## 2022-07-08 ENCOUNTER — APPOINTMENT (OUTPATIENT)
Dept: INFUSION THERAPY | Age: 57
End: 2022-07-08

## 2022-07-12 ENCOUNTER — HOSPITAL ENCOUNTER (OUTPATIENT)
Dept: INFUSION THERAPY | Age: 57
Discharge: HOME | End: 2022-07-12
Payer: COMMERCIAL

## 2022-07-12 VITALS
DIASTOLIC BLOOD PRESSURE: 77 MMHG | OXYGEN SATURATION: 97 % | SYSTOLIC BLOOD PRESSURE: 129 MMHG | RESPIRATION RATE: 16 BRPM | HEART RATE: 74 BPM | TEMPERATURE: 98 F

## 2022-07-12 LAB
ALBUMIN SERPL-MCNC: 3.6 G/DL (ref 3.4–5)
ALBUMIN/GLOB SERPL: 1.3 {RATIO} (ref 0.8–1.7)
ALP SERPL-CCNC: 95 U/L (ref 45–117)
ALT SERPL-CCNC: 36 U/L (ref 13–56)
ANION GAP SERPL CALC-SCNC: 5 MMOL/L (ref 3–18)
AST SERPL-CCNC: 21 U/L (ref 10–38)
BASO+EOS+MONOS # BLD AUTO: 0.7 K/UL (ref 0–2.3)
BASO+EOS+MONOS NFR BLD AUTO: 8 % (ref 0.1–17)
BILIRUB SERPL-MCNC: 0.3 MG/DL (ref 0.2–1)
BUN SERPL-MCNC: 11 MG/DL (ref 7–18)
BUN/CREAT SERPL: 15 (ref 12–20)
CALCIUM SERPL-MCNC: 9.3 MG/DL (ref 8.5–10.1)
CHLORIDE SERPL-SCNC: 100 MMOL/L (ref 100–111)
CO2 SERPL-SCNC: 29 MMOL/L (ref 21–32)
CREAT SERPL-MCNC: 0.73 MG/DL (ref 0.6–1.3)
DIFFERENTIAL METHOD BLD: NORMAL
ERYTHROCYTE [DISTWIDTH] IN BLOOD BY AUTOMATED COUNT: 14 % (ref 11.5–14.5)
GLOBULIN SER CALC-MCNC: 2.7 G/DL (ref 2–4)
GLUCOSE SERPL-MCNC: 337 MG/DL (ref 74–99)
HCT VFR BLD AUTO: 40.6 % (ref 36–48)
HGB BLD-MCNC: 13.1 G/DL (ref 12–16)
LYMPHOCYTES # BLD: 3.2 K/UL (ref 1.1–5.9)
LYMPHOCYTES NFR BLD: 37 % (ref 14–44)
MCH RBC QN AUTO: 29.8 PG (ref 25–35)
MCHC RBC AUTO-ENTMCNC: 32.3 G/DL (ref 31–37)
MCV RBC AUTO: 92.3 FL (ref 78–102)
NEUTS SEG # BLD: 4.7 K/UL (ref 1.8–9.5)
NEUTS SEG NFR BLD: 55 % (ref 40–70)
PLATELET # BLD AUTO: 228 K/UL (ref 140–440)
POTASSIUM SERPL-SCNC: 4.2 MMOL/L (ref 3.5–5.5)
PROT SERPL-MCNC: 6.3 G/DL (ref 6.4–8.2)
RBC # BLD AUTO: 4.4 M/UL (ref 4.1–5.1)
SODIUM SERPL-SCNC: 134 MMOL/L (ref 136–145)
WBC # BLD AUTO: 8.6 K/UL (ref 4.5–13)

## 2022-07-12 PROCEDURE — 85025 COMPLETE CBC W/AUTO DIFF WBC: CPT

## 2022-07-12 PROCEDURE — 80053 COMPREHEN METABOLIC PANEL: CPT

## 2022-07-12 PROCEDURE — 36415 COLL VENOUS BLD VENIPUNCTURE: CPT

## 2022-07-12 NOTE — PROGRESS NOTES
[facility] Lab Visit:    Alireza Kidney  1965  [id number]    4863 Pt arrived ambulatory w/o assist. Labs drawn per order via Left AC venipuncture x1 attempt. Pt tolerated well without complaints. Gauze/ coban to site. Pt departed [facility] ambulatory and in no distress at 1320.      Visit Vitals  /77 (BP 1 Location: Left upper arm, BP Patient Position: Sitting)   Pulse 74   Temp 98 °F (36.7 °C)   Resp 16   SpO2 97% 30-Jan-2022

## 2022-07-16 DIAGNOSIS — G47.00 INSOMNIA, UNSPECIFIED TYPE: ICD-10-CM

## 2022-07-18 NOTE — TELEPHONE ENCOUNTER
Patient was last seen on 2-1-2022    Last prewscribed was on 6-  #30 X 0    No upcoming scheduled appts.

## 2022-07-19 RX ORDER — ZOLPIDEM TARTRATE 10 MG/1
TABLET ORAL
Qty: 30 TABLET | Refills: 2 | Status: SHIPPED | OUTPATIENT
Start: 2022-07-19 | End: 2022-08-16 | Stop reason: SDUPTHER

## 2022-08-01 DIAGNOSIS — Z85.3 HISTORY OF RIGHT BREAST CANCER: ICD-10-CM

## 2022-08-02 RX ORDER — GABAPENTIN 100 MG/1
CAPSULE ORAL
Qty: 30 CAPSULE | Refills: 0 | Status: SHIPPED | OUTPATIENT
Start: 2022-08-02 | End: 2022-08-16 | Stop reason: SDUPTHER

## 2022-08-02 NOTE — TELEPHONE ENCOUNTER
Please see refill request    Patient was last seen on 2-1-2022    Last prescribed  #30 on 7- (Dr Yareli Jackson)    Thank you

## 2022-08-09 ENCOUNTER — APPOINTMENT (OUTPATIENT)
Dept: INFUSION THERAPY | Age: 57
End: 2022-08-09
Payer: COMMERCIAL

## 2022-08-16 ENCOUNTER — HOSPITAL ENCOUNTER (OUTPATIENT)
Dept: INFUSION THERAPY | Age: 57
End: 2022-08-16
Payer: COMMERCIAL

## 2022-08-16 DIAGNOSIS — G47.00 INSOMNIA, UNSPECIFIED TYPE: ICD-10-CM

## 2022-08-16 DIAGNOSIS — F39 MOOD DISORDER (HCC): ICD-10-CM

## 2022-08-16 DIAGNOSIS — Z85.3 HISTORY OF RIGHT BREAST CANCER: ICD-10-CM

## 2022-08-16 NOTE — TELEPHONE ENCOUNTER
Please see refill request    Patient was last seen on 2-1-2022    Last prescribed Ambien on 7-  #30 X 2    Gabapentin last prescribed on 8-2-2022  #30 X 0    No controlled substance agreement current on file    Thank you

## 2022-08-18 RX ORDER — GABAPENTIN 100 MG/1
CAPSULE ORAL
Qty: 30 CAPSULE | Refills: 0 | Status: SHIPPED | OUTPATIENT
Start: 2022-08-18 | End: 2022-09-11 | Stop reason: SDUPTHER

## 2022-08-18 RX ORDER — ZOLPIDEM TARTRATE 10 MG/1
TABLET ORAL
Qty: 30 TABLET | Refills: 2 | Status: SHIPPED | OUTPATIENT
Start: 2022-08-18 | End: 2022-09-11 | Stop reason: SDUPTHER

## 2022-08-18 RX ORDER — VENLAFAXINE HYDROCHLORIDE 150 MG/1
CAPSULE, EXTENDED RELEASE ORAL
Qty: 90 CAPSULE | Refills: 0 | Status: SHIPPED | OUTPATIENT
Start: 2022-08-18 | End: 2022-09-22 | Stop reason: SDUPTHER

## 2022-08-21 DIAGNOSIS — E78.00 HYPERCHOLESTEROLEMIA: ICD-10-CM

## 2022-08-22 RX ORDER — SIMVASTATIN 40 MG/1
40 TABLET, FILM COATED ORAL
Qty: 90 TABLET | Refills: 1 | Status: SHIPPED | OUTPATIENT
Start: 2022-08-22 | End: 2022-09-22 | Stop reason: SDUPTHER

## 2022-08-22 NOTE — TELEPHONE ENCOUNTER
Please see patient refill request    Patient was last seen on 2-1-2022    Last prescribed on 2-1-2022  #90 X 1    Thank you

## 2022-08-25 ENCOUNTER — HOSPITAL ENCOUNTER (OUTPATIENT)
Dept: INFUSION THERAPY | Age: 57
Discharge: HOME | End: 2022-08-25
Payer: COMMERCIAL

## 2022-08-25 VITALS
SYSTOLIC BLOOD PRESSURE: 148 MMHG | HEART RATE: 77 BPM | OXYGEN SATURATION: 95 % | DIASTOLIC BLOOD PRESSURE: 75 MMHG | TEMPERATURE: 97.7 F | RESPIRATION RATE: 18 BRPM

## 2022-08-25 LAB
BASO+EOS+MONOS # BLD AUTO: 0.5 K/UL (ref 0–2.3)
BASO+EOS+MONOS NFR BLD AUTO: 6 % (ref 0.1–17)
DIFFERENTIAL METHOD BLD: NORMAL
ERYTHROCYTE [DISTWIDTH] IN BLOOD BY AUTOMATED COUNT: 13.5 % (ref 11.5–14.5)
HCT VFR BLD AUTO: 43.2 % (ref 36–48)
HGB BLD-MCNC: 13.7 G/DL (ref 12–16)
LYMPHOCYTES # BLD: 2.8 K/UL (ref 1.1–5.9)
LYMPHOCYTES NFR BLD: 32 % (ref 14–44)
MCH RBC QN AUTO: 29.1 PG (ref 25–35)
MCHC RBC AUTO-ENTMCNC: 31.7 G/DL (ref 31–37)
MCV RBC AUTO: 91.9 FL (ref 78–102)
NEUTS SEG # BLD: 5.4 K/UL (ref 1.8–9.5)
NEUTS SEG NFR BLD: 62 % (ref 40–70)
PLATELET # BLD AUTO: 194 K/UL (ref 140–440)
RBC # BLD AUTO: 4.7 M/UL (ref 4.1–5.1)
WBC # BLD AUTO: 8.7 K/UL (ref 4.5–13)

## 2022-08-25 PROCEDURE — 36415 COLL VENOUS BLD VENIPUNCTURE: CPT

## 2022-08-25 PROCEDURE — 85025 COMPLETE CBC W/AUTO DIFF WBC: CPT

## 2022-08-25 RX ORDER — SODIUM CHLORIDE 9 MG/ML
500 INJECTION, SOLUTION INTRAVENOUS AS NEEDED
Status: CANCELLED | OUTPATIENT
Start: 2022-08-25

## 2022-08-25 RX ORDER — SODIUM CHLORIDE 0.9 % (FLUSH) 0.9 %
10-40 SYRINGE (ML) INJECTION AS NEEDED
Status: CANCELLED | OUTPATIENT
Start: 2022-08-25

## 2022-08-25 NOTE — PROGRESS NOTES
[facility] Lab Visit:    Fayn Rodriguez  1965  [id number]    2372: Pt arrived ambulatory w/o assist. VS obtained:  Visit Vitals  BP (!) 148/75 (BP 1 Location: Left upper arm, BP Patient Position: Sitting)   Pulse 77   Temp 97.7 °F (36.5 °C)   Resp 18   SpO2 95%     CBC drawn per order via left AC venipuncture x1 attempt. Pt tolerated well without complaints. Lab results obtained & reviewed. Recent Results (from the past 12 hour(s))   CBC WITH 3 PART DIFF    Collection Time: 08/25/22  1:17 PM   Result Value Ref Range    WBC 8.7 4.5 - 13.0 K/uL    RBC 4.70 4. 10 - 5.10 M/uL    HGB 13.7 12.0 - 16 g/dL    HCT 43.2 36 - 48 %    MCV 91.9 78 - 102 FL    MCH 29.1 25.0 - 35.0 PG    MCHC 31.7 31 - 37 g/dL    RDW 13.5 11.5 - 14.5 %    PLATELET 215 078 - 897 K/uL    NEUTROPHILS 62 40 - 70 %    Mixed cells 6 0.1 - 17 %    LYMPHOCYTES 32 14 - 44 %    ABS. NEUTROPHILS 5.4 1.8 - 9.5 K/UL    ABS. MIXED CELLS 0.5 0.0 - 2.3 K/uL    ABS. LYMPHOCYTES 2.8 1.1 - 5.9 K/UL    DF AUTOMATED       Pt does not require therapeutic phlebotomy today based on these results. Pt departed [facility] ambulatory and in no distress at 1330. She is scheduled to return for her next appt on 9/22/22 at 1400.

## 2022-08-30 ENCOUNTER — APPOINTMENT (OUTPATIENT)
Dept: INFUSION THERAPY | Age: 57
End: 2022-08-30
Payer: COMMERCIAL

## 2022-09-11 DIAGNOSIS — G47.00 INSOMNIA, UNSPECIFIED TYPE: ICD-10-CM

## 2022-09-11 DIAGNOSIS — Z85.3 HISTORY OF RIGHT BREAST CANCER: ICD-10-CM

## 2022-09-11 DIAGNOSIS — M17.0 PRIMARY OSTEOARTHRITIS OF BOTH KNEES: ICD-10-CM

## 2022-09-12 RX ORDER — MELOXICAM 15 MG/1
15 TABLET ORAL DAILY
Qty: 30 TABLET | Refills: 2 | Status: SHIPPED | OUTPATIENT
Start: 2022-09-12 | End: 2022-09-22 | Stop reason: SDUPTHER

## 2022-09-12 RX ORDER — ZOLPIDEM TARTRATE 10 MG/1
TABLET ORAL
Qty: 30 TABLET | Refills: 2 | Status: SHIPPED | OUTPATIENT
Start: 2022-09-12 | End: 2022-09-22 | Stop reason: SDUPTHER

## 2022-09-12 RX ORDER — GABAPENTIN 100 MG/1
CAPSULE ORAL
Qty: 30 CAPSULE | Refills: 0 | Status: SHIPPED | OUTPATIENT
Start: 2022-09-12 | End: 2022-09-22 | Stop reason: SDUPTHER

## 2022-09-12 NOTE — TELEPHONE ENCOUNTER
Please see patient refill request    Patient was last seen on 2-1-2022    Meloxicam last prescribed on 8-4-2022  #30 X 2    Ambien last prescribed on 8-8-2022  #30 X 2    Gabapentin last prescribed on 8-  #30 X 0    Upcoming appt scheduled on 8-    Thank you

## 2022-09-18 DIAGNOSIS — I10 ESSENTIAL HYPERTENSION: ICD-10-CM

## 2022-09-19 RX ORDER — LOSARTAN POTASSIUM 25 MG/1
25 TABLET ORAL DAILY
Qty: 30 TABLET | Refills: 0 | Status: SHIPPED | OUTPATIENT
Start: 2022-09-19 | End: 2022-09-22 | Stop reason: SDUPTHER

## 2022-09-19 NOTE — TELEPHONE ENCOUNTER
Please see refill request    Patient was last seen on 2-1-2022    Last prescribed on 4-  #30 X 0    Upcoming scheduled appt on 9-    Thank you

## 2022-09-22 ENCOUNTER — OFFICE VISIT (OUTPATIENT)
Dept: FAMILY MEDICINE CLINIC | Age: 57
End: 2022-09-22
Payer: COMMERCIAL

## 2022-09-22 ENCOUNTER — HOSPITAL ENCOUNTER (OUTPATIENT)
Dept: INFUSION THERAPY | Age: 57
End: 2022-09-22

## 2022-09-22 VITALS
HEIGHT: 66 IN | OXYGEN SATURATION: 97 % | DIASTOLIC BLOOD PRESSURE: 88 MMHG | RESPIRATION RATE: 20 BRPM | BODY MASS INDEX: 33.43 KG/M2 | HEART RATE: 70 BPM | WEIGHT: 208 LBS | SYSTOLIC BLOOD PRESSURE: 133 MMHG | TEMPERATURE: 98.2 F

## 2022-09-22 DIAGNOSIS — E11.65 TYPE 2 DIABETES MELLITUS WITH HYPERGLYCEMIA, WITHOUT LONG-TERM CURRENT USE OF INSULIN (HCC): Primary | ICD-10-CM

## 2022-09-22 DIAGNOSIS — Z23 ENCOUNTER FOR IMMUNIZATION: ICD-10-CM

## 2022-09-22 DIAGNOSIS — G47.00 INSOMNIA, UNSPECIFIED TYPE: ICD-10-CM

## 2022-09-22 DIAGNOSIS — Z12.11 SCREEN FOR COLON CANCER: ICD-10-CM

## 2022-09-22 DIAGNOSIS — G62.9 NEUROPATHY: ICD-10-CM

## 2022-09-22 DIAGNOSIS — E11.9 TYPE 2 DIABETES MELLITUS WITHOUT COMPLICATION, WITHOUT LONG-TERM CURRENT USE OF INSULIN (HCC): ICD-10-CM

## 2022-09-22 DIAGNOSIS — I10 ESSENTIAL HYPERTENSION: ICD-10-CM

## 2022-09-22 DIAGNOSIS — F39 MOOD DISORDER (HCC): ICD-10-CM

## 2022-09-22 DIAGNOSIS — Z85.3 HISTORY OF RIGHT BREAST CANCER: ICD-10-CM

## 2022-09-22 DIAGNOSIS — M17.0 PRIMARY OSTEOARTHRITIS OF BOTH KNEES: ICD-10-CM

## 2022-09-22 DIAGNOSIS — E78.00 HYPERCHOLESTEROLEMIA: ICD-10-CM

## 2022-09-22 LAB
HBA1C MFR BLD HPLC: 12.2 %
MICROALBUMIN UR TEST STR-MCNC: 80 MG/L
MICROALBUMIN/CREAT RATIO POC: NORMAL MG/G

## 2022-09-22 PROCEDURE — 3046F HEMOGLOBIN A1C LEVEL >9.0%: CPT | Performed by: FAMILY MEDICINE

## 2022-09-22 PROCEDURE — 99215 OFFICE O/P EST HI 40 MIN: CPT | Performed by: FAMILY MEDICINE

## 2022-09-22 PROCEDURE — 82044 UR ALBUMIN SEMIQUANTITATIVE: CPT | Performed by: FAMILY MEDICINE

## 2022-09-22 PROCEDURE — 90471 IMMUNIZATION ADMIN: CPT | Performed by: FAMILY MEDICINE

## 2022-09-22 PROCEDURE — 90715 TDAP VACCINE 7 YRS/> IM: CPT | Performed by: FAMILY MEDICINE

## 2022-09-22 PROCEDURE — 83036 HEMOGLOBIN GLYCOSYLATED A1C: CPT | Performed by: FAMILY MEDICINE

## 2022-09-22 RX ORDER — VENLAFAXINE HYDROCHLORIDE 150 MG/1
150 CAPSULE, EXTENDED RELEASE ORAL DAILY
Qty: 90 CAPSULE | Refills: 1 | Status: SHIPPED | OUTPATIENT
Start: 2022-09-22

## 2022-09-22 RX ORDER — LOSARTAN POTASSIUM 25 MG/1
25 TABLET ORAL DAILY
Qty: 90 TABLET | Refills: 1 | Status: SHIPPED | OUTPATIENT
Start: 2022-09-22 | End: 2022-10-16 | Stop reason: SDUPTHER

## 2022-09-22 RX ORDER — PEN NEEDLE, DIABETIC 30 GX3/16"
NEEDLE, DISPOSABLE MISCELLANEOUS
Qty: 50 EACH | Refills: 1 | Status: SHIPPED | OUTPATIENT
Start: 2022-09-22

## 2022-09-22 RX ORDER — GLIPIZIDE 10 MG/1
10 TABLET ORAL 2 TIMES DAILY
Qty: 180 TABLET | Refills: 0 | Status: SHIPPED | OUTPATIENT
Start: 2022-09-22

## 2022-09-22 RX ORDER — GABAPENTIN 100 MG/1
100 CAPSULE ORAL
Qty: 30 CAPSULE | Refills: 2 | Status: SHIPPED | OUTPATIENT
Start: 2022-09-22

## 2022-09-22 RX ORDER — MELOXICAM 15 MG/1
15 TABLET ORAL DAILY
Qty: 90 TABLET | Refills: 1 | Status: SHIPPED | OUTPATIENT
Start: 2022-09-22

## 2022-09-22 RX ORDER — SIMVASTATIN 40 MG/1
40 TABLET, FILM COATED ORAL
Qty: 90 TABLET | Refills: 1 | Status: SHIPPED | OUTPATIENT
Start: 2022-09-22

## 2022-09-22 RX ORDER — ZOLPIDEM TARTRATE 10 MG/1
TABLET ORAL
Qty: 30 TABLET | Refills: 2 | Status: SHIPPED | OUTPATIENT
Start: 2022-09-22

## 2022-09-22 RX ORDER — DULAGLUTIDE 1.5 MG/.5ML
1.5 INJECTION, SOLUTION SUBCUTANEOUS
Qty: 4 EACH | Refills: 3 | Status: SHIPPED | OUTPATIENT
Start: 2022-09-22

## 2022-09-22 RX ORDER — METFORMIN HYDROCHLORIDE 1000 MG/1
1000 TABLET ORAL 2 TIMES DAILY WITH MEALS
Qty: 180 TABLET | Refills: 0 | Status: SHIPPED | OUTPATIENT
Start: 2022-09-22

## 2022-09-22 NOTE — PROGRESS NOTES
After obtaining consent, and per orders of Dr. Tray Crooks, injection of Tdap  given by Shashi House. Patient instructed to remain in clinic for 20 minutes afterwards, and to report any adverse reaction to me immediately.

## 2022-09-22 NOTE — PROGRESS NOTES
1. \"Have you been to the ER, urgent care clinic since your last visit? Hospitalized since your last visit? \" No    2. \"Have you seen or consulted any other health care providers outside of the [address] [place] [address] since your last visit? \" No     3. For patients aged 39-70: Has the patient had a colonoscopy / FIT/ Cologuard? NA - based on age      If the patient is female:    4. For patients aged 41-77: Has the patient had a mammogram within the past 2 years? Yes - no Care Gap present      5. For patients aged 21-65: Has the patient had a pap smear?  Yes - no Care Gap present

## 2022-09-22 NOTE — PROGRESS NOTES
MEHUL  Jolly Shankar comes in for follow-up care. Diabetes mellitus type 2: Patient has uncontrolled diabetes mellitus type 2. She states that her blood glucose numbers have been fluctuating. She is on metformin and glipizide. Has not been taking her medication as prescribed. She is also not doing much of lifestyle and dietary modification. Her last HbA1c was 11.1. She has not seen the endocrinologist yet. I did a foot exam today that is stable. We discussed the need to see the endocrinologist.  Referral is placed. She will continue with metformin 1000 mg twice a day and glipizide 10 mg twice a day with meals. I will add Trulicity to take once a week. She will keep a blood glucose log and follow-up with me at next visit. She will intensify lifestyle and dietary modification. We will check a microalbumin today. Hypertension: Patient has hypertension. Blood pressure has been fluctuating. She is on losartan 25 mg daily. She states that at home it is usually stable. She will keep a blood pressure log. She will take a low-sodium diet. I will send in a refill of this medication. She will follow-up at next visit. If numbers are elevated we will go up on the losartan to 50 mg daily. Arthralgia: Patient has arthralgia with generalized joint aches and pain. She takes Mobic. This also helps with her back pain. Patient gets bilateral knee pain due to osteoarthritis. She has had TKA. She would like a refill of medication. Prescription will be sent in. Mood disorder: Patient has mood disorder with anxiety and depression. She is on venlafaxine. Stable on the medication. Denies suicidal ideation. Would like a refill of medication. Prescription is sent in. Dyslipidemia: Patient has dyslipidemia. She is on simvastatin. She has been stable on the medication. We will check lipid panel. Neuropathy: Patient has numbness and tingling of the hands and the feet.   She also has neuropathic pain right breast following breast cancer. She takes gabapentin. Would like a refill of this. Prescription is sent in. Obesity: Patient has a BMI of 33.57. She will intensify lifestyle and dietary modification. Breast cancer: Patient has a history of right breast cancer. Had chemotherapy done. She is BRCA positive. She has been followed up by the breast specialist.  She is due to have a mammogram done. We will request the results/report. Polycythemia: Patient has a history of polycythemia. She is followed up by the hematologist/oncologist.  She continues to smoke and this has been thought to be causing some of the polycythemia. We will recheck CBC. Health maintenance: Patient will get the tetanus vaccine today. She declines to get the flu vaccine. Patient will get the FIT stool kit for colon cancer screening. Past Medical History  Past Medical History:   Diagnosis Date    BRCA1 positive     Breast cancer ([facility] [address] 2013    right breast    Diabetes ([facility] [address]     History of chemotherapy 2013    breast Ca    Hypercholesterolemia     Hypertension     Insomnia     Menopause 2012    chemo put her in menopause    Obesity (BMI 30.0-34.9)     UTI (urinary tract infection)        Surgical History  Past Surgical History:   Procedure Laterality Date    HX BREAST BIOPSY      Rt and Left    HX CARPAL TUNNEL RELEASE Bilateral     HX  SECTION      HX CHOLECYSTECTOMY      HX HYSTERECTOMY  2009    partial    HX KNEE ARTHROSCOPY Bilateral     HX MASTECTOMY Right 2013    HX TUBAL LIGATION          Medications  Current Outpatient Medications   Medication Sig Dispense Refill    dulaglutide (Trulicity) 1.5 RH/1.3 mL sub-q pen 0.5 mL by SubCUTAneous route every seven (7) days. 4 Each 3    metFORMIN (GLUCOPHAGE) 1,000 mg tablet Take 1 Tablet by mouth two (2) times daily (with meals). 180 Tablet 0    glipiZIDE (GLUCOTROL) 10 mg tablet Take 1 Tablet by mouth two (2) times a day.  180 Tablet 0    meloxicam (MOBIC) 15 mg tablet Take 1 Tablet by mouth daily. 90 Tablet 1    venlafaxine-SR (EFFEXOR-XR) 150 mg capsule Take 1 Capsule by mouth daily. 90 Capsule 1    zolpidem (AMBIEN) 10 mg tablet TAKE 1 TABLET BY MOUTH AT BEDTIME AS NEEDED FOR SLEEP --  MAX  DAILY  AMOUNT  1  TAB  FOR  INSOMNIA 30 Tablet 2    losartan (COZAAR) 25 mg tablet Take 1 Tablet by mouth daily. 90 Tablet 1    simvastatin (ZOCOR) 40 mg tablet Take 1 Tablet by mouth nightly. 90 Tablet 1    gabapentin (NEURONTIN) 100 mg capsule Take 1 Capsule by mouth nightly. Max Daily Amount: 100 mg. ONE CAP AT BEDTIME. 30 Capsule 2    Insulin Needles, Disposable, 31 gauge x 5/16\" ndle Used to administer Trulicity weekly. 50 Each 1    furosemide (Lasix) 20 mg tablet Take 1 Tablet by mouth daily as needed for PRN Reason (Other) (severe swelling).  90 Tablet 1       Allergies  Allergies   Allergen Reactions    Aspirin Swelling    Codeine Swelling    Penicillins Swelling and Hives       Family History  Family History   Problem Relation Age of Onset    COPD Mother     Breast Cancer Mother 50    Alcohol abuse Father     Diabetes Father     Hypertension Father     Heart Disease Father     Elevated Lipids Father     Hypertension Brother     Hypertension Maternal Grandmother     Hypertension Maternal Grandfather        Social History  Social History     Socioeconomic History    Marital status:      Spouse name: Not on file    Number of children: Not on file    Years of education: Not on file    Highest education level: Not on file   Occupational History    Not on file   Tobacco Use    Smoking status: Every Day     Packs/day: 1.00     Types: Cigarettes    Smokeless tobacco: Never   Vaping Use    Vaping Use: Never used   Substance and Sexual Activity    Alcohol use: Not Currently     Comment: occassionl    Drug use: No    Sexual activity: Not Currently     Partners: Male   Other Topics Concern    Not on file   Social History Narrative    Not on file     Social Determinants of Health Financial Resource Strain: Not on file   Food Insecurity: Not on file   Transportation Needs: Not on file   Physical Activity: Not on file   Stress: Not on file   Social Connections: Not on file   Intimate Partner Violence: Not on file   Housing Stability: Not on file       Review of Systems  Review of Systems - Review of all systems is negative except as noted above in the HPI. Vital Signs  Visit Vitals  /88   Pulse 70   Temp 98.2 °F (36.8 °C)   Resp 20   Ht 5' 6\" (1.676 m)   Wt 208 lb (94.3 kg)   SpO2 97%   BMI 33.57 kg/m²         Physical Exam  Physical Examination: General appearance - alert, well appearing, and in no distress, oriented to person, place, and time, overweight, and acyanotic, in no respiratory distress  Mental status - affect appropriate to mood  Neck - supple, no significant adenopathy  Lymphatics - no palpable lymphadenopathy, no hepatosplenomegaly  Chest - no tachypnea, retractions or cyanosis  Heart - S1 and S2 normal  Abdomen - no rebound tenderness noted  Back exam - limited range of motion  Neurological - abnormal neurological exam unchanged from prior examinations  Musculoskeletal - osteoarthritic changes noted in both hands  Extremities - intact peripheral pulses  Diabetic foot exam:     Left Foot:   Visual Exam: normal    Pulse DP: 2+ (normal)   Filament test: normal sensation        Right Foot:   Visual Exam: normal    Pulse DP: 2+ (normal)   Filament test: normal sensation      Results  Results for orders placed or performed during the hospital encounter of 08/25/22   CBC WITH 3 PART DIFF   Result Value Ref Range    WBC 8.7 4.5 - 13.0 K/uL    RBC 4.70 4. 10 - 5.10 M/uL    HGB 13.7 12.0 - 16 g/dL    HCT 43.2 36 - 48 %    MCV 91.9 78 - 102 FL    MCH 29.1 25.0 - 35.0 PG    MCHC 31.7 31 - 37 g/dL    RDW 13.5 11.5 - 14.5 %    PLATELET 844 989 - 077 K/uL    NEUTROPHILS 62 40 - 70 %    Mixed cells 6 0.1 - 17 %    LYMPHOCYTES 32 14 - 44 %    ABS.  NEUTROPHILS 5.4 1.8 - 9.5 K/UL ABS. MIXED CELLS 0.5 0.0 - 2.3 K/uL    ABS. LYMPHOCYTES 2.8 1.1 - 5.9 K/UL    DF AUTOMATED         ASSESSMENT and PLAN    ICD-10-CM ICD-9-CM    1. Type 2 diabetes mellitus with hyperglycemia, without long-term current use of insulin (HCC)  E11.65 250.00 AMB POC HEMOGLOBIN A1C     790.29 dulaglutide (Trulicity) 1.5 MX/7.6 mL sub-q pen       DIABETES FOOT EXAM      REFERRAL TO ENDOCRINOLOGY      AMB POC URINE, MICROALBUMIN, SEMIQUANTITATIVE      LIPID PANEL      METABOLIC PANEL, COMPREHENSIVE      CBC WITH AUTOMATED DIFF      Insulin Needles, Disposable, 31 gauge x 5/16\" ndle      2. Type 2 diabetes mellitus without complication, without long-term current use of insulin (HCC)  E11.9 250.00 metFORMIN (GLUCOPHAGE) 1,000 mg tablet      glipiZIDE (GLUCOTROL) 10 mg tablet      3. Primary osteoarthritis of both knees  M17.0 715.16 meloxicam (MOBIC) 15 mg tablet      4. Mood disorder ([facility])  F39 296.90 venlafaxine-SR (EFFEXOR-XR) 150 mg capsule      5. Insomnia, unspecified type  G47.00 780.52 zolpidem (AMBIEN) 10 mg tablet      6. Essential hypertension  I10 401.9 losartan (COZAAR) 25 mg tablet      7. Hypercholesterolemia  E78.00 272.0 simvastatin (ZOCOR) 40 mg tablet      8. History of right breast cancer  Z85.3 V10.3       9. Screen for colon cancer  Z12.11 V76.51 OCCULT BLOOD IMMUNOASSAY,DIAGNOSTIC      10. Neuropathy  G62.9 355.9 gabapentin (NEURONTIN) 100 mg capsule      11.  Encounter for immunization  Z23 V03.89 TDAP, BOOSTRIX, (AGE 10 YRS+), IM        lab results and schedule of future lab studies reviewed with patient  reviewed diet, exercise and weight control  very strongly urged to quit smoking to reduce cardiovascular risk  cardiovascular risk and specific lipid/LDL goals reviewed  reviewed medications and side effects in detail  specific diabetic recommendations: low cholesterol diet, weight control and daily exercise discussed, home glucose monitoring emphasized, all medications, side effects and compliance discussed carefully, foot care discussed and Podiatry visits discussed, annual eye examinations at Ophthalmology discussed, glycohemoglobin and other lab monitoring discussed, and long term diabetic complications discussed      I have discussed the diagnosis with the patient and the intended plan of care as seen in the above orders. The patient has received an after-visit summary and questions were answered concerning future plans. I have discussed medication, side effects, and warnings with the patient in detail. The patient verbalized understanding and is in agreement with the plan of care. The patient will contact the office with any additional concerns. I spent at least 44 minutes on this visit with this established patient. José Miguel Rojas MD    PLEASE NOTE:   This document has been produced using voice recognition software.  Unrecognized errors in transcription may be present

## 2022-09-27 ENCOUNTER — HOSPITAL ENCOUNTER (OUTPATIENT)
Dept: INFUSION THERAPY | Age: 57
Discharge: HOME | End: 2022-09-27
Payer: COMMERCIAL

## 2022-09-27 VITALS
HEART RATE: 75 BPM | TEMPERATURE: 98 F | OXYGEN SATURATION: 97 % | RESPIRATION RATE: 18 BRPM | DIASTOLIC BLOOD PRESSURE: 79 MMHG | SYSTOLIC BLOOD PRESSURE: 136 MMHG

## 2022-09-27 LAB
BASO+EOS+MONOS # BLD AUTO: 0.7 K/UL (ref 0–2.3)
BASO+EOS+MONOS NFR BLD AUTO: 9 % (ref 0.1–17)
DIFFERENTIAL METHOD BLD: ABNORMAL
ERYTHROCYTE [DISTWIDTH] IN BLOOD BY AUTOMATED COUNT: 14.6 % (ref 11.5–14.5)
HCT VFR BLD AUTO: 47.8 % (ref 36–48)
HGB BLD-MCNC: 15.3 G/DL (ref 12–16)
LYMPHOCYTES # BLD: 2.3 K/UL (ref 1.1–5.9)
LYMPHOCYTES NFR BLD: 31 % (ref 14–44)
MCH RBC QN AUTO: 28.9 PG (ref 25–35)
MCHC RBC AUTO-ENTMCNC: 32 G/DL (ref 31–37)
MCV RBC AUTO: 90.4 FL (ref 78–102)
NEUTS SEG # BLD: 4.6 K/UL (ref 1.8–9.5)
NEUTS SEG NFR BLD: 60 % (ref 40–70)
PLATELET # BLD AUTO: 209 K/UL (ref 140–440)
RBC # BLD AUTO: 5.29 M/UL (ref 4.1–5.1)
WBC # BLD AUTO: 7.6 K/UL (ref 4.5–13)

## 2022-09-27 PROCEDURE — 99195 PHLEBOTOMY: CPT

## 2022-09-27 PROCEDURE — 85025 COMPLETE CBC W/AUTO DIFF WBC: CPT

## 2022-09-27 PROCEDURE — 36415 COLL VENOUS BLD VENIPUNCTURE: CPT

## 2022-09-27 PROCEDURE — 74011250636 HC RX REV CODE- 250/636: Performed by: INTERNAL MEDICINE

## 2022-09-27 RX ORDER — SODIUM CHLORIDE 9 MG/ML
500 INJECTION, SOLUTION INTRAVENOUS ONCE
Status: COMPLETED | OUTPATIENT
Start: 2022-09-27 | End: 2022-09-27

## 2022-09-27 RX ADMIN — SODIUM CHLORIDE 500 ML: 0.9 INJECTION, SOLUTION INTRAVENOUS at 09:38

## 2022-09-27 NOTE — PROGRESS NOTES
[facility] Progress Note    Date: 2022    Name: Eileen Curtis    MRN: [id number]         : 1965    Ms. Pal Mcadams arrived in the [facility] today at 96 86 26 in stable condition, here for CBC/Phlebotomy (Monthly Status). She was assessed and education was provided. Ms. Eddy's vitals were reviewed. Visit Vitals  /79 (BP 1 Location: Left upper arm, BP Patient Position: Reclining)   Pulse 75   Temp 98 °F (36.7 °C)   Resp 18   SpO2 97%   Breastfeeding No     Blood for the ordered CBC was drawn from her left AC, without incident, and was processed on site. Lab results were obtained and reviewed, and the CBC results from today, were as follows:    Recent Results (from the past 12 hour(s))   CBC WITH 3 PART DIFF    Collection Time: 22  9:05 AM   Result Value Ref Range    WBC 7.6 4.5 - 13.0 K/uL    RBC 5.29 (H) 4.10 - 5.10 M/uL    HGB 15.3 12.0 - 16 g/dL    HCT 47.8 36 - 48 %    MCV 90.4 78 - 102 FL    MCH 28.9 25.0 - 35.0 PG    MCHC 32.0 31 - 37 g/dL    RDW 14.6 (H) 11.5 - 14.5 %    PLATELET 724 291 - 896 K/uL    NEUTROPHILS 60 40 - 70 %    Mixed cells 9 0.1 - 17 %    LYMPHOCYTES 31 14 - 44 %    ABS. NEUTROPHILS 4.6 1.8 - 9.5 K/UL    ABS. MIXED CELLS 0.7 0.0 - 2.3 K/uL    ABS. LYMPHOCYTES 2.3 1.1 - 5.9 K/UL    DF AUTOMATED         Phlebotomy was indicated today per order, for HCT > 45.0. PIV # 21 G was established in her left [facility] without incident, and phlebotomy was started at 0918. Snack and drink provided. Phlebotomy was completed without incident at 0938 after a total of 500 ml Blood was obtained, per order.  ml IV bolus was administered post phlebotomy, per order. After completion of the IV NS Bolus, her PIV was removed and gauze/coban was applied. Ms. Pal Mcadams tolerated well and voiced no complaints. Ms. Pal Mcadams was discharged from [place] [address] in stable condition at 1015.  She is to return in 1 week, on 10/6/22 at 0900, for her next appointment, for CBC/Phlebotomy (Weekly Status).      Lola Monterroso RN  September 27, 2022

## 2022-10-05 RX ORDER — BLOOD-GLUCOSE SENSOR
EACH MISCELLANEOUS
Qty: 2 KIT | Refills: 3 | Status: SHIPPED | OUTPATIENT
Start: 2022-10-05

## 2022-10-05 NOTE — TELEPHONE ENCOUNTER
This pharmacy faxed over request for the following prescriptions to be filled:    Medication requested :   Requested Prescriptions     Pending Prescriptions Disp Refills    flash glucose sensor (FreeStyle [place] 3 Sensor) kit 2 Kit 3     Sig: History check blood glucose daily. PCP: Dr. Monika Michaels or Print: [employer]  Mail order or Local pharmacy: [facility]    Scheduled appointment if not seen by current providers in office: 10/20/22.

## 2022-10-06 ENCOUNTER — HOSPITAL ENCOUNTER (OUTPATIENT)
Dept: INFUSION THERAPY | Age: 57
Discharge: HOME | End: 2022-10-06
Payer: COMMERCIAL

## 2022-10-06 VITALS
HEART RATE: 71 BPM | TEMPERATURE: 97.3 F | OXYGEN SATURATION: 96 % | DIASTOLIC BLOOD PRESSURE: 69 MMHG | SYSTOLIC BLOOD PRESSURE: 123 MMHG | RESPIRATION RATE: 16 BRPM

## 2022-10-06 LAB
BASO+EOS+MONOS # BLD AUTO: 0.8 K/UL (ref 0–2.3)
BASO+EOS+MONOS NFR BLD AUTO: 9 % (ref 0.1–17)
DIFFERENTIAL METHOD BLD: NORMAL
ERYTHROCYTE [DISTWIDTH] IN BLOOD BY AUTOMATED COUNT: 14.4 % (ref 11.5–14.5)
HCT VFR BLD AUTO: 43.6 % (ref 36–48)
HGB BLD-MCNC: 13.6 G/DL (ref 12–16)
LYMPHOCYTES # BLD: 3 K/UL (ref 1.1–5.9)
LYMPHOCYTES NFR BLD: 33 % (ref 14–44)
MCH RBC QN AUTO: 28.1 PG (ref 25–35)
MCHC RBC AUTO-ENTMCNC: 31.2 G/DL (ref 31–37)
MCV RBC AUTO: 90.1 FL (ref 78–102)
NEUTS SEG # BLD: 5.1 K/UL (ref 1.8–9.5)
NEUTS SEG NFR BLD: 57 % (ref 40–70)
PLATELET # BLD AUTO: 245 K/UL (ref 140–440)
RBC # BLD AUTO: 4.84 M/UL (ref 4.1–5.1)
WBC # BLD AUTO: 8.9 K/UL (ref 4.5–13)

## 2022-10-06 PROCEDURE — 85025 COMPLETE CBC W/AUTO DIFF WBC: CPT

## 2022-10-06 PROCEDURE — 99195 PHLEBOTOMY: CPT

## 2022-10-06 PROCEDURE — 74011250636 HC RX REV CODE- 250/636: Performed by: INTERNAL MEDICINE

## 2022-10-06 PROCEDURE — 36415 COLL VENOUS BLD VENIPUNCTURE: CPT

## 2022-10-06 RX ORDER — SODIUM CHLORIDE 9 MG/ML
500 INJECTION, SOLUTION INTRAVENOUS ONCE
Status: COMPLETED | OUTPATIENT
Start: 2022-10-06 | End: 2022-10-06

## 2022-10-06 RX ORDER — BLOOD-GLUCOSE SENSOR
EACH MISCELLANEOUS
Qty: 2 KIT | Refills: 3 | OUTPATIENT
Start: 2022-10-06

## 2022-10-06 RX ADMIN — SODIUM CHLORIDE 500 ML: 0.9 INJECTION, SOLUTION INTRAVENOUS at 09:50

## 2022-10-06 NOTE — PROGRESS NOTES
[facility] Progress Note    Date: 2022    Name: Amarjit Mitchell    MRN: [id number]         : 1965    Ms. Gwendolyn Hooper arrived in the [facility] today at 96 86 26 in stable condition, here for CBC/Phlebotomy (Weekly Status). She was assessed and education was provided. Verbal patient education reminder on Therapeutic Phlebotomy, and potential side effects & adverse reactions was provided, and Ms. Eddy verbalized understanding. Ms. Eddy's vitals were reviewed. Visit Vitals  BP (!) 143/79 (BP 1 Location: Left upper arm, BP Patient Position: Sitting)   Pulse 72   Temp 97.3 °F (36.3 °C)   Resp 16   SpO2 96%   Breastfeeding No         Blood for the ordered CBC was drawn from her left upper forearm at 0910 per SHARRI Saravia, without incident, and was processed on site. Lab results were obtained and reviewed, and the CBC results from today, were as follows:    Recent Results (from the past 12 hour(s))   CBC WITH 3 PART DIFF    Collection Time: 10/06/22  9:10 AM   Result Value Ref Range    WBC 8.9 4.5 - 13.0 K/uL    RBC 4.84 4.10 - 5.10 M/uL    HGB 13.6 12.0 - 16 g/dL    HCT 43.6 36 - 48 %    MCV 90.1 78 - 102 FL    MCH 28.1 25.0 - 35.0 PG    MCHC 31.2 31 - 37 g/dL    RDW 14.4 11.5 - 14.5 %    PLATELET 637 658 - 986 K/uL    NEUTROPHILS 57 40 - 70 %    Mixed cells 9 0.1 - 17 %    LYMPHOCYTES 33 14 - 44 %    ABS. NEUTROPHILS 5.1 1.8 - 9.5 K/UL    ABS. MIXED CELLS 0.8 0.0 - 2.3 K/uL    ABS. LYMPHOCYTES 3.0 1.1 - 5.9 K/UL    DF AUTOMATED             Phlebotomy was indicated today per order, for HCT > 42.0. (weekly status parameters)          PIV # 21 G was established in her left AC at 0925 without incident, and phlebotomy was started. Snack was offered and provided. Phlebotomy was completed without incident at 0950, after a total of 500 ml Blood was obtained, per order.  ml IV bolus was administered post phlebotomy, per order, and also without incident.           After completion of the IV NS Bolus, her PIV was removed and gauze/coban was applied. Ms. Geovani Andrews tolerated well and voiced no complaints. Ms. Geovani Andrews was discharged from [place] [address] in stable condition at 1030. Reji Vega She is to return in 1 week, on next Thursday, 10-13-22 at 1000, for her next appointment, for CBC/Phlebotomy (Weekly Status). She was made aware that she also has an office visit scheduled on next Thursday, 10-13-22 at 0930, to follow up with Dr. Sia Casey, and she acknowledged the appointment.      Mateo Santos RN  October 6, 2022  9:05 AM

## 2022-10-13 ENCOUNTER — HOSPITAL ENCOUNTER (OUTPATIENT)
Dept: INFUSION THERAPY | Age: 57
Discharge: HOME | End: 2022-10-13
Payer: COMMERCIAL

## 2022-10-13 ENCOUNTER — OFFICE VISIT (OUTPATIENT)
Dept: ONCOLOGY | Age: 57
End: 2022-10-13
Payer: COMMERCIAL

## 2022-10-13 VITALS
TEMPERATURE: 98.1 F | OXYGEN SATURATION: 98 % | DIASTOLIC BLOOD PRESSURE: 74 MMHG | RESPIRATION RATE: 18 BRPM | HEART RATE: 76 BPM | SYSTOLIC BLOOD PRESSURE: 149 MMHG

## 2022-10-13 VITALS
WEIGHT: 208 LBS | BODY MASS INDEX: 33.43 KG/M2 | HEART RATE: 71 BPM | OXYGEN SATURATION: 99 % | DIASTOLIC BLOOD PRESSURE: 82 MMHG | RESPIRATION RATE: 16 BRPM | HEIGHT: 66 IN | SYSTOLIC BLOOD PRESSURE: 149 MMHG

## 2022-10-13 DIAGNOSIS — Z85.3 HISTORY OF BREAST CANCER: ICD-10-CM

## 2022-10-13 DIAGNOSIS — D75.1 SECONDARY POLYCYTHEMIA: Primary | ICD-10-CM

## 2022-10-13 LAB
BASO+EOS+MONOS # BLD AUTO: 0.7 K/UL (ref 0–2.3)
BASO+EOS+MONOS NFR BLD AUTO: 9 % (ref 0.1–17)
DIFFERENTIAL METHOD BLD: ABNORMAL
ERYTHROCYTE [DISTWIDTH] IN BLOOD BY AUTOMATED COUNT: 14.8 % (ref 11.5–14.5)
HCT VFR BLD AUTO: 39.4 % (ref 36–48)
HGB BLD-MCNC: 12.2 G/DL (ref 12–16)
LYMPHOCYTES # BLD: 3 K/UL (ref 1.1–5.9)
LYMPHOCYTES NFR BLD: 36 % (ref 14–44)
MCH RBC QN AUTO: 28 PG (ref 25–35)
MCHC RBC AUTO-ENTMCNC: 31 G/DL (ref 31–37)
MCV RBC AUTO: 90.4 FL (ref 78–102)
NEUTS SEG # BLD: 4.6 K/UL (ref 1.8–9.5)
NEUTS SEG NFR BLD: 55 % (ref 40–70)
PLATELET # BLD AUTO: 271 K/UL (ref 140–440)
RBC # BLD AUTO: 4.36 M/UL (ref 4.1–5.1)
WBC # BLD AUTO: 8.3 K/UL (ref 4.5–13)

## 2022-10-13 PROCEDURE — 85025 COMPLETE CBC W/AUTO DIFF WBC: CPT

## 2022-10-13 PROCEDURE — 36415 COLL VENOUS BLD VENIPUNCTURE: CPT

## 2022-10-13 PROCEDURE — 99213 OFFICE O/P EST LOW 20 MIN: CPT | Performed by: INTERNAL MEDICINE

## 2022-10-13 NOTE — PROGRESS NOTES
Hematology/Oncology Note      Date: 10/13/2022    Name: Litzy Holman  : 1965        Nneka Tadeo MD         Subjective:     Chief complaint: Polycythemia    History of Present Illness:   Ms. Moraima Mathias is a most pleasant 62y.o. year old female who was seen for consultation of polycythemia. The patient has a past medical history significant of uncontrolled diabetes mellitus, right breast cancer (Dx 2013, S.p chemotherapy at [facility]) BRCA1 positive, obesity. She is an active smoker, 1/2 PPD for years. The patient has no other complaints since last visit. Denied fever, chills, night sweat, unintentional weight loss, skin lumps or bumps, acute bleeding or bruising issues. Denied headache, acute vision change, dizziness, chest pain, worsen shortness of breath, palpitation, productive cough, nausea, vomiting, abdominal pain, altered bowel habits, dysuria, worsen bone pain or back pain, new focal numbness or weakness.        Past Medical History, Family History, and Social History:    Past Medical History:   Diagnosis Date    BRCA1 positive     Breast cancer ([facility] [address] 2013    right breast    Diabetes ([facility] [address]     History of chemotherapy 2013    breast Ca    Hypercholesterolemia     Hypertension     Insomnia     Menopause 2012    chemo put her in menopause    Obesity (BMI 30.0-34.9)     UTI (urinary tract infection)      Past Surgical History:   Procedure Laterality Date    HX BREAST BIOPSY      Rt and Left    HX CARPAL TUNNEL RELEASE Bilateral     HX  SECTION      HX CHOLECYSTECTOMY      HX HYSTERECTOMY  2009    partial    HX KNEE ARTHROSCOPY Bilateral     HX MASTECTOMY Right 2013    HX TUBAL LIGATION       Social History     Socioeconomic History    Marital status:      Spouse name: Not on file    Number of children: Not on file    Years of education: Not on file    Highest education level: Not on file   Occupational History    Not on file   Tobacco Use    Smoking status: Every Day Packs/day: 1.00     Types: Cigarettes    Smokeless tobacco: Never   Vaping Use    Vaping Use: Never used   Substance and Sexual Activity    Alcohol use: Not Currently     Comment: occassionl    Drug use: No    Sexual activity: Not Currently     Partners: Male   Other Topics Concern    Not on file   Social History Narrative    Not on file     Social Determinants of Health     Financial Resource Strain: Not on file   Food Insecurity: Not on file   Transportation Needs: Not on file   Physical Activity: Not on file   Stress: Not on file   Social Connections: Not on file   Intimate Partner Violence: Not on file   Housing Stability: Not on file     Family History   Problem Relation Age of Onset    COPD Mother     Breast Cancer Mother 50    Alcohol abuse Father     Diabetes Father     Hypertension Father     Heart Disease Father     Elevated Lipids Father     Hypertension Brother     Hypertension Maternal Grandmother     Hypertension Maternal Grandfather      Current Outpatient Medications   Medication Sig Dispense Refill    flash glucose sensor (FreeStyle Francisco 3 Sensor) kit History check blood glucose daily. 2 Kit 3    dulaglutide (Trulicity) 1.5 PL/9.2 mL sub-q pen 0.5 mL by SubCUTAneous route every seven (7) days. 4 Each 3    metFORMIN (GLUCOPHAGE) 1,000 mg tablet Take 1 Tablet by mouth two (2) times daily (with meals). 180 Tablet 0    glipiZIDE (GLUCOTROL) 10 mg tablet Take 1 Tablet by mouth two (2) times a day. 180 Tablet 0    meloxicam (MOBIC) 15 mg tablet Take 1 Tablet by mouth daily. 90 Tablet 1    venlafaxine-SR (EFFEXOR-XR) 150 mg capsule Take 1 Capsule by mouth daily. 90 Capsule 1    zolpidem (AMBIEN) 10 mg tablet TAKE 1 TABLET BY MOUTH AT BEDTIME AS NEEDED FOR SLEEP --  MAX  DAILY  AMOUNT  1  TAB  FOR  INSOMNIA 30 Tablet 2    losartan (COZAAR) 25 mg tablet Take 1 Tablet by mouth daily. 90 Tablet 1    simvastatin (ZOCOR) 40 mg tablet Take 1 Tablet by mouth nightly.  90 Tablet 1    gabapentin (NEURONTIN) 100 mg capsule Take 1 Capsule by mouth nightly. Max Daily Amount: 100 mg. ONE CAP AT BEDTIME. 30 Capsule 2    Insulin Needles, Disposable, 31 gauge x 5/16\" ndle Used to administer Trulicity weekly. 50 Each 1    furosemide (Lasix) 20 mg tablet Take 1 Tablet by mouth daily as needed for PRN Reason (Other) (severe swelling). 90 Tablet 1       Review of Systems   Constitutional:  Negative for chills, diaphoresis, fever, malaise/fatigue and weight loss. Respiratory:  Negative for cough, hemoptysis, shortness of breath and wheezing. Cardiovascular:  Negative for chest pain, palpitations and leg swelling. Gastrointestinal:  Negative for abdominal pain, diarrhea, heartburn, nausea and vomiting. Genitourinary:  Negative for dysuria, frequency, hematuria and urgency. Musculoskeletal:  Negative for joint pain and myalgias. Skin:  Negative for itching and rash. Neurological:  Negative for dizziness, seizures, weakness and headaches. Psychiatric/Behavioral:  Negative for depression. The patient does not have insomnia. Objective:     Visit Vitals  BP (!) 149/82   Pulse 71   Resp 16   Ht 5' 6\" (1.676 m)   Wt 94.3 kg (208 lb)   SpO2 99%   BMI 33.57 kg/m²         ECOG Performance Status (grade): 0  0 - able to carry on all pre-disease activity w/out restriction  1 - restricted but able to carry out light work  2 - ambulatory and can self- care but unable to carry out work  3 - bed or chair >50% of waking hours  4 - completely disable, total care, confined to bed or chair    Physical Exam  Constitutional:       Appearance: Normal appearance. HENT:      Head: Normocephalic and atraumatic. Eyes:      Pupils: Pupils are equal, round, and reactive to light. Cardiovascular:      Rate and Rhythm: Normal rate and regular rhythm. Heart sounds: Normal heart sounds. Pulmonary:      Effort: Pulmonary effort is normal.      Breath sounds: Normal breath sounds.    Abdominal:      General: Bowel sounds are normal. Palpations: Abdomen is soft. Tenderness: no abdominal tenderness There is no guarding. Musculoskeletal:         General: Normal range of motion. Cervical back: Neck supple. Right lower leg: No edema. Left lower leg: No edema. Skin:     General: Skin is warm. Neurological:      General: No focal deficit present. Mental Status: She is alert and oriented to person, place, and time. Mental status is at baseline. Diagnostics:      No results found for this or any previous visit (from the past 96 hour(s)). Imaging:  No results found for this or any previous visit. Results for orders placed during the hospital encounter of 10/14/21    XR KNEE RT MAX 2 VWS    Narrative  EXAM: Right knee, 2 views  COMPARISON: None. INDICATION: Right knee pain, status post right knee arthroplasty. _______________  FINDINGS: Portable AP and crosstable lateral views of the right knee were  obtained. Surgical changes and hardware of total right knee arthroplasty. No  lucency adjacent to the hardware to suggest complication. Expected postsurgical  effusion, soft tissue swelling, and emphysema. No acute fracture. Adequate  anatomic alignment of right knee prosthesis. _______________    Impression  Total right knee arthroplasty, without complication. No results found for this or any previous visit. Pathology          Assessment:                                        1. Secondary polycythemia    2. History of breast cancer          Plan:                                      Polycythemia  -- The patient has a past medical history significant of uncontrolled diabetes mellitus, right breast cancer (Dx 2013) BRCA1 positive, obesity. -- Lab reviews indicated chronic polycythemia since at least 9/3/2019, hematocrit was 47.6%. -- She is an active smoker, 1-1/2 PPD for years. -- Erythropoietin 11.5, WNL; elevated Carbon monoxide; NEGATIVE JAK2/MPL/CALR mutations, BCR/ABL1.  Abd US reviewed. -- Her polycythemia was likely a secondary process, smoking related. She is still active smoking. She reported she had decreased it to 1/2ppd.  -- Clinical stable. Recent lab reviewed with the patient. 10/6/2022 CBC reported hemoglobin 13.6, hematocrit 43.6%, WBC 8.9, platelet 532,020    Plan:  -- She was on monthly phlebotomy schedule while she is working on smoking cessation. Will space out Phlebotomy to 3-months schedule, if Hct>45%. -- Advised he importance of smoking cessation. F/u PCP for smoking cessation. -- Patient is allergic to ASA. -- We will see the patient back in clinic in about 6 months. Always sooner if required. History of breast cancer  -- She has a past medical history significant of right breast cancer (Dx 2013, S.p chemotherapy at [facility]) BRCA1 positive, obesity. -- The patient will continue doing her breast exams on a monthly basis. -- She has f/u with PCP for annual mammogram as scheduled. She is due for mammogram this month. -- Patient was advised to follow-up PCP for age-appropriate cancer screening. No orders of the defined types were placed in this encounter. Ms. Brain Henry has a reminder for a \"due or due soon\" health maintenance. I have asked that she contact her primary care provider for follow-up on this health maintenance. All of patient's questions answered to their apparent satisfaction. They verbally show understanding and agreement with aforementioned plan. Milagros Armstrong MD  10/13/2022        Parts of this document has been produced using Dragon dictation system. Unrecognized errors in transcription may be present. Please do not hesitate to reach out for any questions or clarifications.     CC: Latrice Santiago MD

## 2022-10-13 NOTE — PROGRESS NOTES
[address] Lab Visit:    Fifi Weinstein  1965  [id number]    5123 Pt arrived ambulatory w/o assist. Labs drawn per order via Left AC venipuncture x1 attempt. Pt tolerated well without complaints. Gauze/ coban to site. Pt departed [facility] ambulatory and in no distress at 1000.      Visit Vitals  BP (!) 149/74 (BP 1 Location: Left upper arm, BP Patient Position: Sitting)   Pulse 76   Temp 98.1 °F (36.7 °C)   Resp 18   SpO2 98%

## 2022-10-16 DIAGNOSIS — I10 ESSENTIAL HYPERTENSION: ICD-10-CM

## 2022-10-19 RX ORDER — LOSARTAN POTASSIUM 25 MG/1
25 TABLET ORAL DAILY
Qty: 90 TABLET | Refills: 1 | Status: SHIPPED | OUTPATIENT
Start: 2022-10-19

## 2022-10-26 ENCOUNTER — TELEPHONE (OUTPATIENT)
Dept: FAMILY MEDICINE CLINIC | Age: 57
End: 2022-10-26

## 2022-10-26 NOTE — TELEPHONE ENCOUNTER
Pt calling upset because she put in 2 my chart messages to get a mammogram, and nobody has responded to her. Please advise.  Thank you!!!

## 2022-10-27 DIAGNOSIS — Z12.31 SCREENING MAMMOGRAM FOR BREAST CANCER: Primary | ICD-10-CM

## 2022-12-22 DIAGNOSIS — G62.9 NEUROPATHY: ICD-10-CM

## 2022-12-22 NOTE — TELEPHONE ENCOUNTER
Please see patient refill request    Patient is aware that provider is out of the office and this will be addressed when he returns next week.     Last prescribed on 9-  #30 X 2    Last seen 9-    Thank you

## 2022-12-23 RX ORDER — GABAPENTIN 100 MG/1
100 CAPSULE ORAL
Qty: 30 CAPSULE | Refills: 2 | Status: SHIPPED | OUTPATIENT
Start: 2022-12-23

## 2022-12-28 DIAGNOSIS — E78.00 HYPERCHOLESTEROLEMIA: ICD-10-CM

## 2022-12-29 RX ORDER — SIMVASTATIN 40 MG/1
40 TABLET, FILM COATED ORAL
Qty: 90 TABLET | Refills: 1 | Status: SHIPPED | OUTPATIENT
Start: 2022-12-29

## 2023-01-01 DIAGNOSIS — G47.00 INSOMNIA, UNSPECIFIED TYPE: ICD-10-CM

## 2023-01-01 DIAGNOSIS — I10 ESSENTIAL HYPERTENSION: ICD-10-CM

## 2023-01-03 RX ORDER — LOSARTAN POTASSIUM 25 MG/1
25 TABLET ORAL DAILY
Qty: 90 TABLET | Refills: 1 | Status: SHIPPED | OUTPATIENT
Start: 2023-01-03

## 2023-01-03 NOTE — TELEPHONE ENCOUNTER
Please see refill request    Patient was last seen on 9-    Last prescribed on 9-  #30 X 2    Thank you

## 2023-01-03 NOTE — TELEPHONE ENCOUNTER
Please see refill request    Patient was last seen on 9-    Last prescribed on 10-  #90 X 1    Thank you

## 2023-01-04 RX ORDER — ZOLPIDEM TARTRATE 10 MG/1
TABLET ORAL
Qty: 30 TABLET | Refills: 2 | Status: SHIPPED | OUTPATIENT
Start: 2023-01-04

## 2023-01-12 ENCOUNTER — HOSPITAL ENCOUNTER (OUTPATIENT)
Dept: INFUSION THERAPY | Age: 58
End: 2023-01-12
Payer: COMMERCIAL

## 2023-01-12 VITALS
DIASTOLIC BLOOD PRESSURE: 89 MMHG | SYSTOLIC BLOOD PRESSURE: 173 MMHG | RESPIRATION RATE: 18 BRPM | TEMPERATURE: 98 F | OXYGEN SATURATION: 96 % | HEART RATE: 66 BPM

## 2023-01-12 LAB
BASO+EOS+MONOS # BLD AUTO: 0.8 K/UL (ref 0–2.3)
BASO+EOS+MONOS NFR BLD AUTO: 9 % (ref 0.1–17)
DIFFERENTIAL METHOD BLD: ABNORMAL
ERYTHROCYTE [DISTWIDTH] IN BLOOD BY AUTOMATED COUNT: 17 % (ref 11.5–14.5)
HCT VFR BLD AUTO: 44.7 % (ref 36–48)
HGB BLD-MCNC: 14.2 G/DL (ref 12–16)
LYMPHOCYTES # BLD: 2.5 K/UL (ref 1.1–5.9)
LYMPHOCYTES NFR BLD: 30 % (ref 14–44)
MCH RBC QN AUTO: 27.1 PG (ref 25–35)
MCHC RBC AUTO-ENTMCNC: 31.8 G/DL (ref 31–37)
MCV RBC AUTO: 85.3 FL (ref 78–102)
NEUTS SEG # BLD: 5 K/UL (ref 1.8–9.5)
NEUTS SEG NFR BLD: 61 % (ref 40–70)
PLATELET # BLD AUTO: 204 K/UL (ref 140–440)
RBC # BLD AUTO: 5.24 M/UL (ref 4.1–5.1)
WBC # BLD AUTO: 8.3 K/UL (ref 4.5–13)

## 2023-01-12 PROCEDURE — 85025 COMPLETE CBC W/AUTO DIFF WBC: CPT

## 2023-01-12 PROCEDURE — 36415 COLL VENOUS BLD VENIPUNCTURE: CPT

## 2023-01-12 NOTE — PROGRESS NOTES
[facility] Lab Visit:    Kimberley Fermin  1965  [id number]    1005Pt arrived ambulatory w/o assist. Labs drawn per order via Left AC venipuncture x1 attempt. Pt tolerated well without complaints. Gauze/ coban to site. Pt departed hospitals ambulatory and in no distress at 1015.      Visit Vitals  BP (!) 173/89 (BP 1 Location: Left upper arm, BP Patient Position: Sitting)   Pulse 66   Temp 98 °F (36.7 °C)   Resp 18   SpO2 96%

## 2023-02-17 ENCOUNTER — TELEPHONE (OUTPATIENT)
Facility: CLINIC | Age: 58
End: 2023-02-17

## 2023-04-02 DIAGNOSIS — G47.00 INSOMNIA, UNSPECIFIED TYPE: Primary | ICD-10-CM

## 2023-04-03 RX ORDER — ZOLPIDEM TARTRATE 10 MG/1
TABLET ORAL
Qty: 30 TABLET | Refills: 2 | Status: SHIPPED | OUTPATIENT
Start: 2023-04-03 | End: 2023-07-02

## 2023-04-06 ENCOUNTER — APPOINTMENT (OUTPATIENT)
Dept: INFUSION THERAPY | Age: 58
End: 2023-04-06

## 2023-04-06 ENCOUNTER — HOSPITAL ENCOUNTER (OUTPATIENT)
Facility: HOSPITAL | Age: 58
Setting detail: INFUSION SERIES
End: 2023-04-06
Payer: COMMERCIAL

## 2023-04-06 VITALS
SYSTOLIC BLOOD PRESSURE: 135 MMHG | DIASTOLIC BLOOD PRESSURE: 74 MMHG | RESPIRATION RATE: 18 BRPM | TEMPERATURE: 98 F | OXYGEN SATURATION: 95 % | HEART RATE: 70 BPM

## 2023-04-06 LAB
BASO+EOS+MONOS # BLD AUTO: 0.6 K/UL (ref 0–2.3)
BASO+EOS+MONOS NFR BLD AUTO: 9 % (ref 0.1–17)
DIFFERENTIAL METHOD BLD: ABNORMAL
ERYTHROCYTE [DISTWIDTH] IN BLOOD BY AUTOMATED COUNT: 16.1 % (ref 11.5–14.5)
HCT VFR BLD AUTO: 46.9 % (ref 36–48)
HGB BLD-MCNC: 15.2 G/DL (ref 12–16)
LYMPHOCYTES # BLD: 2.5 K/UL (ref 1.1–5.9)
LYMPHOCYTES NFR BLD: 33 % (ref 14–44)
MCH RBC QN AUTO: 29 PG (ref 25–35)
MCHC RBC AUTO-ENTMCNC: 32.4 G/DL (ref 31–37)
MCV RBC AUTO: 89.3 FL (ref 78–102)
NEUTS SEG # BLD: 4.4 K/UL (ref 1.8–9.5)
NEUTS SEG NFR BLD: 59 % (ref 40–70)
PLATELET # BLD AUTO: 175 K/UL (ref 140–440)
RBC # BLD AUTO: 5.25 M/UL (ref 4.1–5.1)
WBC # BLD AUTO: 7.5 K/UL (ref 4.5–13)

## 2023-04-06 PROCEDURE — 85025 COMPLETE CBC W/AUTO DIFF WBC: CPT

## 2023-04-06 PROCEDURE — 99195 PHLEBOTOMY: CPT

## 2023-04-06 PROCEDURE — 36415 COLL VENOUS BLD VENIPUNCTURE: CPT

## 2023-04-06 NOTE — PROGRESS NOTES
[facility] Progress Note    Date: 2023    Name: Jessica Rodriguez    MRN: [id number]         : 1965    CBC/Therapeautic Phlebotomy Q 3 Months    Ms. Ferraro to [place], ambulatory at 1020. Pt was assessed and education was provided. Ms. Ferraro's vitals were reviewed and patient was observed for 5 minutes prior to treatment. Vitals:    23 1020   BP: 133/77   Pulse: 72   Temp: 97.6 °F (36.4 °C)   SpO2: 95%       CBC obtained peripherally from patient's LEFT ac x 1 attempt. CBC processed in house. Lab results were obtained and reviewed. Recent Results (from the past 12 hour(s))   CBC with Partial Differential    Collection Time: 23 10:29 AM   Result Value Ref Range    WBC 7.5 4.5 - 13.0 K/uL    RBC 5.25 (H) 4.10 - 5.10 M/uL    Hemoglobin 15.2 12.0 - 16 g/dL    Hematocrit 46.9 36 - 48 %    MCV 89.3 78 - 102 FL    MCH 29.0 25.0 - 35.0 PG    MCHC 32.4 31 - 37 g/dL    RDW 16.1 (H) 11.5 - 14.5 %    Platelets 285 669 - 645 K/uL    Seg Neutrophils 59 40 - 70 %    Mixed Cells 9 0.1 - 17 %    Lymphocytes 33 14 - 44 %    Segs Absolute 4.4 1.8 - 9.5 K/UL    ABSOLUTE MIXED CELLS 0.6 0.0 - 2.3 K/uL    Absolute Lymph # 2.5 1.1 - 5.9 K/UL    Differential Type AUTOMATED       Per parameter HCT >45, phlebotomy required today. 20g inserted in patient's LEFT FA x 2 attempts. Phlebotomy started at 1053 and ended at  1113 after 500 ml of blood obtained.  ml given as ordered post treatment. Patient's VS remained stable throughout treatment today. Vitals:    23 1020 23 1115 23 1133   BP: 133/77 132/74 135/74   Pulse: 72 68 70   Resp:  18 18   Temp: 97.6 °F (36.4 °C)  98 °F (36.7 °C)   TempSrc: Oral  Oral   SpO2: 95%  95%       PIV removed and patient held gauze to site. Patient declined the use of coban or a bandaid. Ms. Magana Her tolerated well, and had no complaints. Patient armband removed and shredded.     Ms. Izzy Rodriguez was discharged from Outpatient Infusion

## 2023-04-13 ENCOUNTER — HOSPITAL ENCOUNTER (OUTPATIENT)
Facility: HOSPITAL | Age: 58
Setting detail: INFUSION SERIES
End: 2023-04-13
Payer: COMMERCIAL

## 2023-04-13 ENCOUNTER — HOSPITAL ENCOUNTER (OUTPATIENT)
Facility: HOSPITAL | Age: 58
Setting detail: SPECIMEN
Discharge: HOME | End: 2023-04-16
Payer: COMMERCIAL

## 2023-04-13 DIAGNOSIS — Z85.3 PERSONAL HISTORY OF MALIGNANT NEOPLASM OF BREAST: ICD-10-CM

## 2023-04-13 DIAGNOSIS — D75.1 SECONDARY POLYCYTHEMIA: ICD-10-CM

## 2023-04-13 LAB
ALBUMIN SERPL-MCNC: 3.8 G/DL (ref 3.4–5)
ALBUMIN/GLOB SERPL: 1.3 (ref 0.8–1.7)
ALP SERPL-CCNC: 72 U/L (ref 45–117)
ALT SERPL-CCNC: 31 U/L (ref 13–56)
ANION GAP SERPL CALC-SCNC: 4 MMOL/L (ref 3–18)
AST SERPL-CCNC: 18 U/L (ref 10–38)
BASO+EOS+MONOS # BLD AUTO: 0.8 K/UL (ref 0–2.3)
BASO+EOS+MONOS NFR BLD AUTO: 8 % (ref 0.1–17)
BILIRUB SERPL-MCNC: 0.6 MG/DL (ref 0.2–1)
BUN SERPL-MCNC: 14 MG/DL (ref 7–18)
BUN/CREAT SERPL: 22 (ref 12–20)
CALCIUM SERPL-MCNC: 9.6 MG/DL (ref 8.5–10.1)
CHLORIDE SERPL-SCNC: 106 MMOL/L (ref 100–111)
CO2 SERPL-SCNC: 29 MMOL/L (ref 21–32)
CREAT SERPL-MCNC: 0.64 MG/DL (ref 0.6–1.3)
DIFFERENTIAL METHOD BLD: ABNORMAL
ERYTHROCYTE [DISTWIDTH] IN BLOOD BY AUTOMATED COUNT: 16.3 % (ref 11.5–14.5)
GLOBULIN SER CALC-MCNC: 3 G/DL (ref 2–4)
GLUCOSE SERPL-MCNC: 89 MG/DL (ref 74–99)
HCT VFR BLD AUTO: 43 % (ref 36–48)
HGB BLD-MCNC: 14.1 G/DL (ref 12–16)
LYMPHOCYTES # BLD: 3.2 K/UL (ref 1.1–5.9)
LYMPHOCYTES NFR BLD: 34 % (ref 14–44)
MCH RBC QN AUTO: 29.5 PG (ref 25–35)
MCHC RBC AUTO-ENTMCNC: 32.8 G/DL (ref 31–37)
MCV RBC AUTO: 90 FL (ref 78–102)
NEUTS SEG # BLD: 5.5 K/UL (ref 1.8–9.5)
NEUTS SEG NFR BLD: 58 % (ref 40–70)
PLATELET # BLD AUTO: 220 K/UL (ref 140–440)
POTASSIUM SERPL-SCNC: 4 MMOL/L (ref 3.5–5.5)
PROT SERPL-MCNC: 6.8 G/DL (ref 6.4–8.2)
RBC # BLD AUTO: 4.78 M/UL (ref 4.1–5.1)
SODIUM SERPL-SCNC: 139 MMOL/L (ref 136–145)
WBC # BLD AUTO: 9.5 K/UL (ref 4.5–13)

## 2023-04-13 PROCEDURE — 85025 COMPLETE CBC W/AUTO DIFF WBC: CPT

## 2023-04-13 PROCEDURE — 80053 COMPREHEN METABOLIC PANEL: CPT

## 2023-04-13 NOTE — PROGRESS NOTES
[facility] Short Consult Note                  (Labs/Type & Cross/Portflush/Antibiotic/Non-Chemo injections)      Date: 2023    Name: Jacki Sr    MRN: [id number]         : 1965    Treatment: Good [place]    Ms. Barry Heredia was seen in Dr. Cristian Thompson office today and they margarito labs and margarito a CBC since patient stated she had a visit with [address] [place] [address]. CBC processed in house. Lab results were obtained and reviewed. Recent Results (from the past 12 hour(s))   CBC with Partial Differential    Collection Time: 23  9:26 AM   Result Value Ref Range    WBC 9.5 4.5 - 13.0 K/uL    RBC 4.78 4.10 - 5.10 M/uL    Hemoglobin 14.1 12.0 - 16 g/dL    Hematocrit 43.0 36 - 48 %    MCV 90.0 78 - 102 FL    MCH 29.5 25.0 - 35.0 PG    MCHC 32.8 31 - 37 g/dL    RDW 16.3 (H) 11.5 - 14.5 %    Platelets 976 223 - 510 K/uL    Seg Neutrophils 58 40 - 70 %    Mixed Cells 8 0.1 - 17 %    Lymphocytes 34 14 - 44 %    Segs Absolute 5.5 1.8 - 9.5 K/UL    ABSOLUTE MIXED CELLS 0.8 0.0 - 2.3 K/uL    Absolute Lymph # 3.2 1.1 - 5.9 K/UL    Differential Type AUTOMATED         Per today's CBC results HGB <45 phlebotomy is not needed. Ms. Barry Heredia made aware of her next appointment here at [address] [place] [address] on  at 11:00 for CBC/PHLEBOTOMY.       Marina Robertson RN  2023  9:59 AM    l

## 2023-05-04 DIAGNOSIS — G62.9 POLYNEUROPATHY, UNSPECIFIED: ICD-10-CM

## 2023-05-04 RX ORDER — GABAPENTIN 100 MG/1
100 CAPSULE ORAL 2 TIMES DAILY
Qty: 60 CAPSULE | Refills: 2 | Status: SHIPPED | OUTPATIENT
Start: 2023-05-04 | End: 2023-08-02

## 2023-05-22 RX ORDER — VENLAFAXINE HYDROCHLORIDE 150 MG/1
CAPSULE, EXTENDED RELEASE ORAL
Qty: 90 CAPSULE | Refills: 0 | OUTPATIENT
Start: 2023-05-22

## 2023-05-26 DIAGNOSIS — G62.9 POLYNEUROPATHY, UNSPECIFIED: ICD-10-CM

## 2023-05-26 DIAGNOSIS — R23.2 HOT FLASHES: Primary | ICD-10-CM

## 2023-05-26 RX ORDER — VENLAFAXINE HYDROCHLORIDE 150 MG/1
150 CAPSULE, EXTENDED RELEASE ORAL DAILY
Qty: 30 CAPSULE | Refills: 0 | Status: SHIPPED | OUTPATIENT
Start: 2023-05-26 | End: 2023-05-30 | Stop reason: SDUPTHER

## 2023-05-30 ENCOUNTER — OFFICE VISIT (OUTPATIENT)
Facility: CLINIC | Age: 58
End: 2023-05-30
Payer: COMMERCIAL

## 2023-05-30 VITALS
BODY MASS INDEX: 31.82 KG/M2 | HEIGHT: 66 IN | DIASTOLIC BLOOD PRESSURE: 65 MMHG | RESPIRATION RATE: 20 BRPM | SYSTOLIC BLOOD PRESSURE: 114 MMHG | WEIGHT: 198 LBS | TEMPERATURE: 98.2 F | HEART RATE: 74 BPM | OXYGEN SATURATION: 99 %

## 2023-05-30 DIAGNOSIS — E11.65 TYPE 2 DIABETES MELLITUS WITH HYPERGLYCEMIA, UNSPECIFIED WHETHER LONG TERM INSULIN USE (HCC): ICD-10-CM

## 2023-05-30 DIAGNOSIS — I10 ESSENTIAL (PRIMARY) HYPERTENSION: ICD-10-CM

## 2023-05-30 DIAGNOSIS — R23.2 HOT FLASHES: ICD-10-CM

## 2023-05-30 DIAGNOSIS — G47.00 INSOMNIA, UNSPECIFIED TYPE: ICD-10-CM

## 2023-05-30 DIAGNOSIS — F39 UNSPECIFIED MOOD (AFFECTIVE) DISORDER (HCC): Primary | ICD-10-CM

## 2023-05-30 DIAGNOSIS — G62.9 POLYNEUROPATHY, UNSPECIFIED: ICD-10-CM

## 2023-05-30 DIAGNOSIS — E78.5 HYPERLIPIDEMIA, UNSPECIFIED HYPERLIPIDEMIA TYPE: ICD-10-CM

## 2023-05-30 PROBLEM — R55 SYNCOPE AND COLLAPSE: Status: ACTIVE | Noted: 2023-05-24

## 2023-05-30 PROBLEM — R61 NIGHT SWEATS: Status: ACTIVE | Noted: 2022-10-25

## 2023-05-30 PROBLEM — R94.39 ABNORMAL STRESS TEST: Status: ACTIVE | Noted: 2023-05-24

## 2023-05-30 PROBLEM — R07.9 CHEST PAIN: Status: ACTIVE | Noted: 2023-05-24

## 2023-05-30 PROBLEM — E11.69 HYPERLIPIDEMIA DUE TO TYPE 2 DIABETES MELLITUS (HCC): Status: ACTIVE | Noted: 2022-10-25

## 2023-05-30 PROCEDURE — 3078F DIAST BP <80 MM HG: CPT | Performed by: FAMILY MEDICINE

## 2023-05-30 PROCEDURE — G8427 DOCREV CUR MEDS BY ELIG CLIN: HCPCS | Performed by: FAMILY MEDICINE

## 2023-05-30 PROCEDURE — 2022F DILAT RTA XM EVC RTNOPTHY: CPT | Performed by: FAMILY MEDICINE

## 2023-05-30 PROCEDURE — G8417 CALC BMI ABV UP PARAM F/U: HCPCS | Performed by: FAMILY MEDICINE

## 2023-05-30 PROCEDURE — 4004F PT TOBACCO SCREEN RCVD TLK: CPT | Performed by: FAMILY MEDICINE

## 2023-05-30 PROCEDURE — 3074F SYST BP LT 130 MM HG: CPT | Performed by: FAMILY MEDICINE

## 2023-05-30 PROCEDURE — 3046F HEMOGLOBIN A1C LEVEL >9.0%: CPT | Performed by: FAMILY MEDICINE

## 2023-05-30 PROCEDURE — 3017F COLORECTAL CA SCREEN DOC REV: CPT | Performed by: FAMILY MEDICINE

## 2023-05-30 PROCEDURE — 99215 OFFICE O/P EST HI 40 MIN: CPT | Performed by: FAMILY MEDICINE

## 2023-05-30 RX ORDER — GABAPENTIN 100 MG/1
100 CAPSULE ORAL 2 TIMES DAILY
Qty: 60 CAPSULE | Refills: 3 | Status: SHIPPED | OUTPATIENT
Start: 2023-05-30 | End: 2023-08-28

## 2023-05-30 RX ORDER — LEVOFLOXACIN 750 MG/1
TABLET ORAL
COMMUNITY
Start: 2023-05-18

## 2023-05-30 RX ORDER — BUPROPION HYDROCHLORIDE 150 MG/1
TABLET, EXTENDED RELEASE ORAL
COMMUNITY
Start: 2023-05-19 | End: 2023-05-30 | Stop reason: SDUPTHER

## 2023-05-30 RX ORDER — VENLAFAXINE HYDROCHLORIDE 150 MG/1
150 CAPSULE, EXTENDED RELEASE ORAL DAILY
Qty: 90 CAPSULE | Refills: 3 | Status: SHIPPED | OUTPATIENT
Start: 2023-05-30

## 2023-05-30 RX ORDER — TIRZEPATIDE 7.5 MG/.5ML
INJECTION, SOLUTION SUBCUTANEOUS
COMMUNITY
Start: 2023-05-19

## 2023-05-30 RX ORDER — BUPROPION HYDROCHLORIDE 150 MG/1
TABLET, EXTENDED RELEASE ORAL
Qty: 180 TABLET | Refills: 1 | Status: SHIPPED | OUTPATIENT
Start: 2023-05-30

## 2023-05-30 RX ORDER — NITROGLYCERIN 0.4 MG/1
TABLET SUBLINGUAL
COMMUNITY
Start: 2023-05-25

## 2023-05-30 RX ORDER — LEVOFLOXACIN 750 MG/1
TABLET ORAL
Status: CANCELLED | OUTPATIENT
Start: 2023-05-30

## 2023-05-30 RX ORDER — AMLODIPINE BESYLATE 2.5 MG/1
TABLET ORAL
COMMUNITY
Start: 2023-05-25

## 2023-05-30 RX ORDER — SIMVASTATIN 40 MG
40 TABLET ORAL NIGHTLY
Qty: 90 TABLET | Refills: 3 | Status: SHIPPED | OUTPATIENT
Start: 2023-05-30

## 2023-05-30 RX ORDER — ASPIRIN 81 MG
TABLET,CHEWABLE ORAL
COMMUNITY
Start: 2023-05-25

## 2023-05-30 RX ORDER — CARVEDILOL 6.25 MG/1
TABLET ORAL
COMMUNITY
Start: 2023-05-25

## 2023-05-30 RX ORDER — ZOLPIDEM TARTRATE 10 MG/1
TABLET ORAL
Qty: 30 TABLET | Refills: 2 | Status: SHIPPED | OUTPATIENT
Start: 2023-05-30 | End: 2023-08-30

## 2023-05-30 RX ORDER — VENLAFAXINE HYDROCHLORIDE 150 MG/1
150 CAPSULE, EXTENDED RELEASE ORAL DAILY
Qty: 30 CAPSULE | OUTPATIENT
Start: 2023-05-30

## 2023-05-30 SDOH — ECONOMIC STABILITY: FOOD INSECURITY: WITHIN THE PAST 12 MONTHS, YOU WORRIED THAT YOUR FOOD WOULD RUN OUT BEFORE YOU GOT MONEY TO BUY MORE.: NEVER TRUE

## 2023-05-30 SDOH — ECONOMIC STABILITY: FOOD INSECURITY: WITHIN THE PAST 12 MONTHS, THE FOOD YOU BOUGHT JUST DIDN'T LAST AND YOU DIDN'T HAVE MONEY TO GET MORE.: NEVER TRUE

## 2023-05-30 SDOH — ECONOMIC STABILITY: INCOME INSECURITY: HOW HARD IS IT FOR YOU TO PAY FOR THE VERY BASICS LIKE FOOD, HOUSING, MEDICAL CARE, AND HEATING?: NOT VERY HARD

## 2023-05-30 SDOH — ECONOMIC STABILITY: HOUSING INSECURITY
IN THE LAST 12 MONTHS, WAS THERE A TIME WHEN YOU DID NOT HAVE A STEADY PLACE TO SLEEP OR SLEPT IN A SHELTER (INCLUDING NOW)?: NO

## 2023-05-30 ASSESSMENT — PATIENT HEALTH QUESTIONNAIRE - PHQ9
SUM OF ALL RESPONSES TO PHQ QUESTIONS 1-9: 0
SUM OF ALL RESPONSES TO PHQ9 QUESTIONS 1 & 2: 0
SUM OF ALL RESPONSES TO PHQ QUESTIONS 1-9: 0
SUM OF ALL RESPONSES TO PHQ QUESTIONS 1-9: 0
1. LITTLE INTEREST OR PLEASURE IN DOING THINGS: 0
2. FEELING DOWN, DEPRESSED OR HOPELESS: 0
SUM OF ALL RESPONSES TO PHQ QUESTIONS 1-9: 0

## 2023-07-11 ENCOUNTER — HOSPITAL ENCOUNTER (OUTPATIENT)
Facility: HOSPITAL | Age: 58
Setting detail: INFUSION SERIES
End: 2023-07-11
Payer: COMMERCIAL

## 2023-07-11 VITALS
OXYGEN SATURATION: 95 % | DIASTOLIC BLOOD PRESSURE: 81 MMHG | RESPIRATION RATE: 20 BRPM | HEART RATE: 71 BPM | TEMPERATURE: 96.7 F | SYSTOLIC BLOOD PRESSURE: 141 MMHG

## 2023-07-11 DIAGNOSIS — D75.1 SECONDARY POLYCYTHEMIA: Primary | ICD-10-CM

## 2023-07-11 DIAGNOSIS — Z85.3 PERSONAL HISTORY OF MALIGNANT NEOPLASM OF BREAST: ICD-10-CM

## 2023-07-11 LAB
BASO+EOS+MONOS # BLD AUTO: 0.7 K/UL (ref 0–2.3)
BASO+EOS+MONOS NFR BLD AUTO: 8 % (ref 0.1–17)
DIFFERENTIAL METHOD BLD: NORMAL
ERYTHROCYTE [DISTWIDTH] IN BLOOD BY AUTOMATED COUNT: 14.1 % (ref 11.5–14.5)
HCT VFR BLD AUTO: 45.1 % (ref 36–48)
HGB BLD-MCNC: 14.7 G/DL (ref 12–16)
LYMPHOCYTES # BLD: 2.8 K/UL (ref 1.1–5.9)
LYMPHOCYTES NFR BLD: 34 % (ref 14–44)
MCH RBC QN AUTO: 30.1 PG (ref 25–35)
MCHC RBC AUTO-ENTMCNC: 32.6 G/DL (ref 31–37)
MCV RBC AUTO: 92.2 FL (ref 78–102)
NEUTS SEG # BLD: 4.7 K/UL (ref 1.8–9.5)
NEUTS SEG NFR BLD: 58 % (ref 40–70)
PLATELET # BLD AUTO: 227 K/UL (ref 140–440)
RBC # BLD AUTO: 4.89 M/UL (ref 4.1–5.1)
WBC # BLD AUTO: 8.2 K/UL (ref 4.5–13)

## 2023-07-11 PROCEDURE — 85025 COMPLETE CBC W/AUTO DIFF WBC: CPT

## 2023-07-11 PROCEDURE — 36415 COLL VENOUS BLD VENIPUNCTURE: CPT

## 2023-07-24 NOTE — PROGRESS NOTES
[facility] Lab Visit:    Elizabeth Garcia  1965  [id number]    8040 Pt arrived ambulatory w/o assist. Labs drawn per order via Ledft AC venipuncture x1 attempt. Pt tolerated well without complaints. Gauze/ coban to site. Pt departed [facility] ambulatory and in no distress at 1400.      Visit Vitals  BP (!) 151/74 (BP 1 Location: Left upper arm, BP Patient Position: Sitting)   Pulse 84   Temp 98.2 °F (36.8 °C)   Resp 16   SpO2 95%
3 = A little assistance

## 2023-08-29 ENCOUNTER — OFFICE VISIT (OUTPATIENT)
Facility: CLINIC | Age: 58
End: 2023-08-29
Payer: COMMERCIAL

## 2023-08-29 VITALS
DIASTOLIC BLOOD PRESSURE: 84 MMHG | TEMPERATURE: 97.7 F | SYSTOLIC BLOOD PRESSURE: 152 MMHG | OXYGEN SATURATION: 92 % | RESPIRATION RATE: 16 BRPM | BODY MASS INDEX: 32.6 KG/M2 | WEIGHT: 202 LBS | HEART RATE: 72 BPM

## 2023-08-29 DIAGNOSIS — E78.00 HIGH CHOLESTEROL: ICD-10-CM

## 2023-08-29 DIAGNOSIS — R06.83 SNORING: ICD-10-CM

## 2023-08-29 DIAGNOSIS — Z12.11 SCREENING FOR COLON CANCER: ICD-10-CM

## 2023-08-29 DIAGNOSIS — Z85.3 HISTORY OF BREAST CANCER: ICD-10-CM

## 2023-08-29 DIAGNOSIS — I10 PRIMARY HYPERTENSION: Primary | ICD-10-CM

## 2023-08-29 DIAGNOSIS — Z72.0 TOBACCO USE: ICD-10-CM

## 2023-08-29 DIAGNOSIS — R53.83 OTHER FATIGUE: ICD-10-CM

## 2023-08-29 PROBLEM — N95.1 HOT FLASHES DUE TO MENOPAUSE: Status: ACTIVE | Noted: 2023-08-29

## 2023-08-29 PROBLEM — E55.9 VITAMIN D DEFICIENCY: Status: ACTIVE | Noted: 2023-08-29

## 2023-08-29 PROBLEM — G93.32 CHRONIC FATIGUE SYNDROME: Status: ACTIVE | Noted: 2023-08-29

## 2023-08-29 PROCEDURE — G8427 DOCREV CUR MEDS BY ELIG CLIN: HCPCS | Performed by: NURSE PRACTITIONER

## 2023-08-29 PROCEDURE — 3079F DIAST BP 80-89 MM HG: CPT | Performed by: NURSE PRACTITIONER

## 2023-08-29 PROCEDURE — 99213 OFFICE O/P EST LOW 20 MIN: CPT | Performed by: NURSE PRACTITIONER

## 2023-08-29 PROCEDURE — 3077F SYST BP >= 140 MM HG: CPT | Performed by: NURSE PRACTITIONER

## 2023-08-29 PROCEDURE — 4004F PT TOBACCO SCREEN RCVD TLK: CPT | Performed by: NURSE PRACTITIONER

## 2023-08-29 PROCEDURE — 3017F COLORECTAL CA SCREEN DOC REV: CPT | Performed by: NURSE PRACTITIONER

## 2023-08-29 PROCEDURE — G8417 CALC BMI ABV UP PARAM F/U: HCPCS | Performed by: NURSE PRACTITIONER

## 2023-08-29 SDOH — ECONOMIC STABILITY: FOOD INSECURITY: WITHIN THE PAST 12 MONTHS, YOU WORRIED THAT YOUR FOOD WOULD RUN OUT BEFORE YOU GOT MONEY TO BUY MORE.: NEVER TRUE

## 2023-08-29 SDOH — ECONOMIC STABILITY: INCOME INSECURITY: HOW HARD IS IT FOR YOU TO PAY FOR THE VERY BASICS LIKE FOOD, HOUSING, MEDICAL CARE, AND HEATING?: NOT HARD AT ALL

## 2023-08-29 SDOH — ECONOMIC STABILITY: FOOD INSECURITY: WITHIN THE PAST 12 MONTHS, THE FOOD YOU BOUGHT JUST DIDN'T LAST AND YOU DIDN'T HAVE MONEY TO GET MORE.: NEVER TRUE

## 2023-08-29 ASSESSMENT — PATIENT HEALTH QUESTIONNAIRE - PHQ9
SUM OF ALL RESPONSES TO PHQ9 QUESTIONS 1 & 2: 0
SUM OF ALL RESPONSES TO PHQ QUESTIONS 1-9: 0
2. FEELING DOWN, DEPRESSED OR HOPELESS: 0
SUM OF ALL RESPONSES TO PHQ QUESTIONS 1-9: 0
SUM OF ALL RESPONSES TO PHQ QUESTIONS 1-9: 0
1. LITTLE INTEREST OR PLEASURE IN DOING THINGS: 0
SUM OF ALL RESPONSES TO PHQ QUESTIONS 1-9: 0

## 2023-08-30 LAB
ALBUMIN SERPL-MCNC: 4.3 G/DL (ref 3.8–4.9)
ALBUMIN/GLOB SERPL: 2 {RATIO} (ref 1.2–2.2)
ALP SERPL-CCNC: 78 IU/L (ref 44–121)
ALT SERPL-CCNC: 20 IU/L (ref 0–32)
AST SERPL-CCNC: 18 IU/L (ref 0–40)
BILIRUB SERPL-MCNC: 0.3 MG/DL (ref 0–1.2)
BUN SERPL-MCNC: 11 MG/DL (ref 6–24)
BUN/CREAT SERPL: 18 (ref 9–23)
CALCIUM SERPL-MCNC: 9.6 MG/DL (ref 8.7–10.2)
CHLORIDE SERPL-SCNC: 102 MMOL/L (ref 96–106)
CHOLEST SERPL-MCNC: 147 MG/DL (ref 100–199)
CO2 SERPL-SCNC: 24 MMOL/L (ref 20–29)
CREAT SERPL-MCNC: 0.62 MG/DL (ref 0.57–1)
EGFRCR SERPLBLD CKD-EPI 2021: 103 ML/MIN/1.73
FERRITIN SERPL-MCNC: 26 NG/ML (ref 15–150)
GLOBULIN SER CALC-MCNC: 2.1 G/DL (ref 1.5–4.5)
GLUCOSE SERPL-MCNC: 96 MG/DL (ref 70–99)
HDLC SERPL-MCNC: 45 MG/DL
IRON SATN MFR SERPL: 16 % (ref 15–55)
IRON SERPL-MCNC: 66 UG/DL (ref 27–159)
LDLC SERPL CALC-MCNC: 72 MG/DL (ref 0–99)
POTASSIUM SERPL-SCNC: 4.4 MMOL/L (ref 3.5–5.2)
PROT SERPL-MCNC: 6.4 G/DL (ref 6–8.5)
SODIUM SERPL-SCNC: 141 MMOL/L (ref 134–144)
SPECIMEN STATUS REPORT: NORMAL
SPECIMEN STATUS REPORT: NORMAL
TIBC SERPL-MCNC: 417 UG/DL (ref 250–450)
TRIGL SERPL-MCNC: 180 MG/DL (ref 0–149)
TSH SERPL DL<=0.005 MIU/L-ACNC: 1.25 UIU/ML (ref 0.45–4.5)
UIBC SERPL-MCNC: 351 UG/DL (ref 131–425)
VLDLC SERPL CALC-MCNC: 30 MG/DL (ref 5–40)

## 2023-08-30 NOTE — RESULT ENCOUNTER NOTE
Please let this patient know that the blood work we did is stable  Please let her know if she would like to discuss sooner we can but we can review at her follow-up appointment on September 14

## 2023-08-31 LAB — HBA1C MFR BLD HPLC: 6.7 %

## 2023-09-14 ENCOUNTER — OFFICE VISIT (OUTPATIENT)
Facility: CLINIC | Age: 58
End: 2023-09-14
Payer: COMMERCIAL

## 2023-09-14 VITALS
WEIGHT: 202 LBS | SYSTOLIC BLOOD PRESSURE: 138 MMHG | BODY MASS INDEX: 32.6 KG/M2 | DIASTOLIC BLOOD PRESSURE: 80 MMHG | TEMPERATURE: 97.6 F | OXYGEN SATURATION: 94 % | RESPIRATION RATE: 15 BRPM | HEART RATE: 65 BPM

## 2023-09-14 DIAGNOSIS — E11.9 TYPE 2 DIABETES MELLITUS WITHOUT COMPLICATION, WITHOUT LONG-TERM CURRENT USE OF INSULIN (HCC): ICD-10-CM

## 2023-09-14 DIAGNOSIS — D75.1 POLYCYTHEMIA: ICD-10-CM

## 2023-09-14 DIAGNOSIS — I10 BENIGN ESSENTIAL HYPERTENSION: Primary | ICD-10-CM

## 2023-09-14 PROCEDURE — 3075F SYST BP GE 130 - 139MM HG: CPT | Performed by: NURSE PRACTITIONER

## 2023-09-14 PROCEDURE — G8417 CALC BMI ABV UP PARAM F/U: HCPCS | Performed by: NURSE PRACTITIONER

## 2023-09-14 PROCEDURE — 99213 OFFICE O/P EST LOW 20 MIN: CPT | Performed by: NURSE PRACTITIONER

## 2023-09-14 PROCEDURE — 3046F HEMOGLOBIN A1C LEVEL >9.0%: CPT | Performed by: NURSE PRACTITIONER

## 2023-09-14 PROCEDURE — 3078F DIAST BP <80 MM HG: CPT | Performed by: NURSE PRACTITIONER

## 2023-09-14 PROCEDURE — 2022F DILAT RTA XM EVC RTNOPTHY: CPT | Performed by: NURSE PRACTITIONER

## 2023-09-14 PROCEDURE — 4004F PT TOBACCO SCREEN RCVD TLK: CPT | Performed by: NURSE PRACTITIONER

## 2023-09-14 PROCEDURE — 3017F COLORECTAL CA SCREEN DOC REV: CPT | Performed by: NURSE PRACTITIONER

## 2023-09-14 PROCEDURE — G8427 DOCREV CUR MEDS BY ELIG CLIN: HCPCS | Performed by: NURSE PRACTITIONER

## 2023-09-14 RX ORDER — ZOLPIDEM TARTRATE 10 MG/1
TABLET ORAL
COMMUNITY
Start: 2023-09-11

## 2023-09-14 ASSESSMENT — PATIENT HEALTH QUESTIONNAIRE - PHQ9
2. FEELING DOWN, DEPRESSED OR HOPELESS: 0
SUM OF ALL RESPONSES TO PHQ9 QUESTIONS 1 & 2: 1
1. LITTLE INTEREST OR PLEASURE IN DOING THINGS: 1
SUM OF ALL RESPONSES TO PHQ QUESTIONS 1-9: 1

## 2023-09-14 NOTE — PROGRESS NOTES
[place] presents today for   Chief Complaint   Patient presents with    Follow-up     2 week follow up on blood pressure        Is someone accompanying this pt? No     Is the patient using any DME equipment during OV? No     Health Maintenance reviewed and discussed and ordered per Provider. Health Maintenance Due   Topic Date Due    Hepatitis B vaccine (1 of 3 - 3-dose series) Never done    Pneumococcal 0-64 years Vaccine (1 - PCV) Never done    HIV screen  Never done    Colorectal Cancer Screen  Never done    Shingles vaccine (1 of 2) Never done    COVID-19 Vaccine (3 - Moderna series) 04/12/2021    Diabetic retinal exam  08/10/2022    Breast cancer screen  10/21/2022    A1C test (Diabetic or Prediabetic)  12/22/2022    Flu vaccine (1) 08/01/2023    Diabetic foot exam  09/22/2023    Diabetic Alb to Cr ratio (uACR) test  09/22/2023   . 1. \"Have you been to the ER, urgent care clinic since your last visit? Hospitalized since your last visit? \" No    2. \"Have you seen or consulted any other health care providers outside of the [address] [place] [address] since your last visit? \" Yes When: 8/2023 Where: [facility]       3. For patients aged 43-73: Has the patient had a colonoscopy / FIT/ Cologuard? No      If the patient is female:    4. For patients aged 43-66: Has the patient had a mammogram within the past 2 years? No      5. For patients aged 21-65: Has the patient had a pap smear?  No

## 2023-09-14 NOTE — PROGRESS NOTES
Daisy Reeves (: 1965) is a 62 y.o. female patient, here for evaluation of the following chief complaint(s):  Follow-up (2 week follow up on blood pressure )       ASSESSMENT/PLAN:  Below is the assessment and plan developed based on review of pertinent history, physical exam, labs, studies, and medications. The patient and I discussed her lab work in detail. Lab findings are very stable. Continue medications as prescribed  The patient's blood pressure is mildly elevated today when she initially came in and then after sitting and being examined her blood pressure did come to stable and normal numbers. I explained to the patient though I would like her to do a blood pressure log for 2 weeks  Get the log to me so I can evaluate on a daily basis her average blood pressures. Patient states she understands and agrees with our plan. #2 patient has been diagnosed with secondary polycythemia by hematology probably related to cigarette smoking. The patient does continue to cigarette smoke. The patient does have an appointment with [place] [facility] in [facility] on  she is to keep this appointment. #3: HCC: Diabetes. The patient is followed by endocrinology specialty Heriberto Hudson nurse practitioner. The patient is stable today with no concerns of hyper or hypoglycemia. Follow-up sooner as needed 6-month follow-up      1. Benign essential hypertension  2. Polycythemia  3. Type 2 diabetes mellitus without complication, without long-term current use of insulin ([address])    No results found for any visits on 23. Return in about 6 months (around 3/14/2024). I have discussed the diagnosis with the patient and the intended plan as seen in the above orders. Questions were answered concerning future plans. I have discussed medication side effects and warnings with the patient as well.  I have reviewed the plan of care with the patient, accepted their input and they are in

## 2023-09-15 ENCOUNTER — HOSPITAL ENCOUNTER (OUTPATIENT)
Age: 58
Discharge: HOME | End: 2023-09-15
Payer: COMMERCIAL

## 2023-09-15 VITALS — BODY MASS INDEX: 32.47 KG/M2 | HEIGHT: 66 IN | WEIGHT: 202 LBS

## 2023-09-15 DIAGNOSIS — Z12.31 VISIT FOR SCREENING MAMMOGRAM: ICD-10-CM

## 2023-09-15 PROCEDURE — 77063 BREAST TOMOSYNTHESIS BI: CPT

## 2023-09-18 NOTE — RESULT ENCOUNTER NOTE
Please let the patient know mammogram is negative with no mammographic evidence of malignancy, next screening mammogram recommended for 1 year

## 2023-09-19 ENCOUNTER — SCHEDULED TELEPHONE ENCOUNTER (OUTPATIENT)
Age: 58
End: 2023-09-19

## 2023-09-19 DIAGNOSIS — Z12.11 ENCOUNTER FOR SCREENING FOR MALIGNANT NEOPLASM OF COLON: Primary | ICD-10-CM

## 2023-09-19 RX ORDER — POLYETHYLENE GLYCOL 3350, SODIUM SULFATE ANHYDROUS, SODIUM BICARBONATE, SODIUM CHLORIDE, POTASSIUM CHLORIDE 236; 22.74; 6.74; 5.86; 2.97 G/4L; G/4L; G/4L; G/4L; G/4L
4 POWDER, FOR SOLUTION ORAL ONCE
Qty: 4000 ML | Refills: 0 | Status: SHIPPED | OUTPATIENT
Start: 2023-09-19 | End: 2023-09-19

## 2023-09-19 NOTE — TELEPHONE ENCOUNTER
Peg prep given to the patient via telephone and MyChart. Procedure 10/3/2023, Dx Code Z12.11    Patient verbalized understanding.

## 2023-09-19 NOTE — PROGRESS NOTES
Peg prep given to the patient telephone and MyChart. Procedure 10/3/2023, Dx Code Z12.11  Patient verbalized understanding.

## 2023-09-21 ENCOUNTER — ANESTHESIA EVENT (OUTPATIENT)
Age: 58
End: 2023-09-21
Payer: COMMERCIAL

## 2023-09-27 ENCOUNTER — PREP FOR PROCEDURE (OUTPATIENT)
Age: 58
End: 2023-09-27

## 2023-09-27 DIAGNOSIS — Z12.11 COLON CANCER SCREENING: Primary | ICD-10-CM

## 2023-09-27 RX ORDER — SODIUM CHLORIDE, SODIUM LACTATE, POTASSIUM CHLORIDE, CALCIUM CHLORIDE 600; 310; 30; 20 MG/100ML; MG/100ML; MG/100ML; MG/100ML
INJECTION, SOLUTION INTRAVENOUS CONTINUOUS
Status: CANCELLED | OUTPATIENT
Start: 2023-09-27

## 2023-09-29 ENCOUNTER — TELEPHONE (OUTPATIENT)
Facility: CLINIC | Age: 58
End: 2023-09-29

## 2023-09-29 NOTE — TELEPHONE ENCOUNTER
Pt said she requested that all meds be refilled when she was at appointment. I asked which meds, she stated that she was at work just all of them. Yas Gamez, can you look and see which ones she reported to you and refill?

## 2023-10-01 DIAGNOSIS — G62.9 POLYNEUROPATHY, UNSPECIFIED: ICD-10-CM

## 2023-10-02 RX ORDER — MELOXICAM 15 MG/1
15 TABLET ORAL DAILY
Qty: 30 TABLET | Refills: 0 | Status: SHIPPED | OUTPATIENT
Start: 2023-10-02

## 2023-10-03 ENCOUNTER — HOSPITAL ENCOUNTER (OUTPATIENT)
Age: 58
Setting detail: OUTPATIENT SURGERY
Discharge: HOME | End: 2023-10-03
Attending: INTERNAL MEDICINE | Admitting: INTERNAL MEDICINE
Payer: COMMERCIAL

## 2023-10-03 ENCOUNTER — ANESTHESIA (OUTPATIENT)
Age: 58
End: 2023-10-03
Payer: COMMERCIAL

## 2023-10-03 VITALS
SYSTOLIC BLOOD PRESSURE: 161 MMHG | OXYGEN SATURATION: 97 % | DIASTOLIC BLOOD PRESSURE: 89 MMHG | WEIGHT: 200 LBS | HEART RATE: 63 BPM | TEMPERATURE: 98 F | BODY MASS INDEX: 32.28 KG/M2 | RESPIRATION RATE: 18 BRPM

## 2023-10-03 DIAGNOSIS — Z12.11 COLON CANCER SCREENING: ICD-10-CM

## 2023-10-03 LAB
GLUCOSE BLD STRIP.AUTO-MCNC: 127 MG/DL (ref 70–110)
PERFORMED BY:: ABNORMAL

## 2023-10-03 PROCEDURE — 7100000011 HC PHASE II RECOVERY - ADDTL 15 MIN: Performed by: INTERNAL MEDICINE

## 2023-10-03 PROCEDURE — 7100000010 HC PHASE II RECOVERY - FIRST 15 MIN: Performed by: INTERNAL MEDICINE

## 2023-10-03 PROCEDURE — 3700000001 HC ADD 15 MINUTES (ANESTHESIA): Performed by: INTERNAL MEDICINE

## 2023-10-03 PROCEDURE — 3600007501: Performed by: INTERNAL MEDICINE

## 2023-10-03 PROCEDURE — 6360000002 HC RX W HCPCS: Performed by: NURSE ANESTHETIST, CERTIFIED REGISTERED

## 2023-10-03 PROCEDURE — 2500000003 HC RX 250 WO HCPCS: Performed by: NURSE ANESTHETIST, CERTIFIED REGISTERED

## 2023-10-03 PROCEDURE — 2580000003 HC RX 258

## 2023-10-03 PROCEDURE — 2709999900 HC NON-CHARGEABLE SUPPLY: Performed by: INTERNAL MEDICINE

## 2023-10-03 PROCEDURE — 3600007511: Performed by: INTERNAL MEDICINE

## 2023-10-03 PROCEDURE — 82962 GLUCOSE BLOOD TEST: CPT

## 2023-10-03 PROCEDURE — 3700000000 HC ANESTHESIA ATTENDED CARE: Performed by: INTERNAL MEDICINE

## 2023-10-03 PROCEDURE — 45378 DIAGNOSTIC COLONOSCOPY: CPT | Performed by: INTERNAL MEDICINE

## 2023-10-03 RX ORDER — SODIUM CHLORIDE 0.9 % (FLUSH) 0.9 %
5-40 SYRINGE (ML) INJECTION EVERY 12 HOURS SCHEDULED
Status: DISCONTINUED | OUTPATIENT
Start: 2023-10-03 | End: 2023-10-03 | Stop reason: HOSPADM

## 2023-10-03 RX ORDER — SODIUM CHLORIDE, SODIUM LACTATE, POTASSIUM CHLORIDE, CALCIUM CHLORIDE 600; 310; 30; 20 MG/100ML; MG/100ML; MG/100ML; MG/100ML
INJECTION, SOLUTION INTRAVENOUS CONTINUOUS
Status: DISCONTINUED | OUTPATIENT
Start: 2023-10-03 | End: 2023-10-03 | Stop reason: HOSPADM

## 2023-10-03 RX ORDER — PROPOFOL 10 MG/ML
INJECTION, EMULSION INTRAVENOUS PRN
Status: DISCONTINUED | OUTPATIENT
Start: 2023-10-03 | End: 2023-10-03 | Stop reason: SDUPTHER

## 2023-10-03 RX ORDER — GABAPENTIN 100 MG/1
100 CAPSULE ORAL 2 TIMES DAILY
Qty: 60 CAPSULE | Refills: 3 | Status: SHIPPED | OUTPATIENT
Start: 2023-10-03 | End: 2024-01-01

## 2023-10-03 RX ORDER — SODIUM CHLORIDE 9 MG/ML
INJECTION, SOLUTION INTRAVENOUS PRN
Status: DISCONTINUED | OUTPATIENT
Start: 2023-10-03 | End: 2023-10-03 | Stop reason: HOSPADM

## 2023-10-03 RX ORDER — LIDOCAINE HYDROCHLORIDE 20 MG/ML
INJECTION, SOLUTION INFILTRATION; PERINEURAL PRN
Status: DISCONTINUED | OUTPATIENT
Start: 2023-10-03 | End: 2023-10-03 | Stop reason: SDUPTHER

## 2023-10-03 RX ORDER — SODIUM CHLORIDE 0.9 % (FLUSH) 0.9 %
5-40 SYRINGE (ML) INJECTION PRN
Status: DISCONTINUED | OUTPATIENT
Start: 2023-10-03 | End: 2023-10-03 | Stop reason: HOSPADM

## 2023-10-03 RX ADMIN — SODIUM CHLORIDE, POTASSIUM CHLORIDE, SODIUM LACTATE AND CALCIUM CHLORIDE: 600; 310; 30; 20 INJECTION, SOLUTION INTRAVENOUS at 09:39

## 2023-10-03 RX ADMIN — PROPOFOL 50 MG: 10 INJECTION, EMULSION INTRAVENOUS at 10:35

## 2023-10-03 RX ADMIN — PROPOFOL 30 MG: 10 INJECTION, EMULSION INTRAVENOUS at 10:38

## 2023-10-03 RX ADMIN — PROPOFOL 100 MG: 10 INJECTION, EMULSION INTRAVENOUS at 10:33

## 2023-10-03 RX ADMIN — PROPOFOL 20 MG: 10 INJECTION, EMULSION INTRAVENOUS at 10:36

## 2023-10-03 RX ADMIN — LIDOCAINE HYDROCHLORIDE 40 MG: 20 INJECTION, SOLUTION INFILTRATION; PERINEURAL at 10:33

## 2023-10-03 ASSESSMENT — LIFESTYLE VARIABLES: SMOKING_STATUS: 1

## 2023-10-03 NOTE — ANESTHESIA PRE PROCEDURE
ASA 2       Induction: intravenous. Anesthetic plan and risks discussed with patient.                         CORBIN Oakley - CRNA   10/3/2023

## 2023-10-03 NOTE — DISCHARGE INSTRUCTIONS
Recommendations:  Recommend repeat colonoscopy within 1 year or Cologuard testing. If the patient has Cologuard testing is negative then repeat colonoscopy in 5 years. Future colonoscopy should be done with her holding her GLP-1 receptor agonists (Mounjaro) for 1 week prior to her procedure as well as using extended bowel preparation. Another option is performed the procedure 1 week after her last dose of Mounjaro. Repeat colonoscopy within 1 year with extended bowel preparation as above for screening. Follow up as needed.

## 2023-10-03 NOTE — INTERVAL H&P NOTE
Update History & Physical    The patient's History and Physical of October 3, 2023 was reviewed with the patient and I examined the patient. There was no change. The surgical site was confirmed by the patient and me. Plan: The risks, benefits, expected outcome, and alternative to the recommended procedure have been discussed with the patient. Patient understands and wants to proceed with the procedure.      Electronically signed by Pa Larose MD on 10/3/2023 at 9:02 AM

## 2023-10-03 NOTE — ANESTHESIA POSTPROCEDURE EVALUATION
Department of Anesthesiology  Postprocedure Note    Patient: Daisy Reeves  MRN: [id number]  YOB: 1965  Date of evaluation: 10/3/2023      Procedure Summary     Date: 10/03/23 Room / Location: [facility] ENDO 01 / [facility] ENDOSCOPY    Anesthesia Start: 1030 Anesthesia Stop: 8753    Procedure: COLONOSCOPY(5) (Rectum) Diagnosis:       Special screening for malignant neoplasms, colon      (Special screening for malignant neoplasms, colon [Z12.11])    Surgeons: Fay Rebolledo MD Responsible Provider: CORBIN Cool CRNA    Anesthesia Type: TIVA ASA Status: 2          Anesthesia Type: TIVA    Crys Phase I: Crys Score: 10    Crys Phase II:        Anesthesia Post Evaluation    Patient location during evaluation: PACU  Patient participation: complete - patient participated  Level of consciousness: awake  Airway patency: patent  Nausea & Vomiting: no vomiting and no nausea  Complications: no  Cardiovascular status: blood pressure returned to baseline and hemodynamically stable  Respiratory status: room air  Hydration status: stable  Multimodal analgesia pain management approach

## 2023-10-03 NOTE — OP NOTE
Colonoscopy procedure note    Date of service: 10/3/2023    Type:  Screening    Indication for procedure: Colon cancer screening    Anesthesia classification: ASA class 2    Patient history and physical been accomplished and documented. Patient is assessed and determined to be appropriate candidate for planned procedure and sedation; patient reassessed immediately prior to sedation. Sedation plan: MAC per anesthesia    Surgical assistant: Not applicable    Airway assessment: Range of motion: Normal, mouth opening, Visual obstruction: No.    UPDATED PREOP EXAM:  Unchanged. VS: Reviewed  Gen: in NAD  CV: RRR, no murmur  Resp: CTA  Abd: Soft, NTND, +BS  Extrem: No cyanosis or edema  Neuro: Awake and alert    Informed consent obtained: Yes. The indications, risks including but not limited to bleeding, perforation, infection, death, and potential failure to visual areas are diagnosed neoplasia, alternatives and benefits were discussed with the patient prior to the procedure. Patient identity and procedure was verified, absent was obtained, and is consistent with the consent form found in the patient's records. PROCEDURE PERFORMED:  COLONOSCOPY  to the cecum with MAC     INSTRUMENT: Olympus colonoscope per nursing notes. FINDINGS:    External anal lesions: Normal   Rectum: normal.   Retroflexion view: Normal  Sigmoid: normal   Descending Colon: normal   Transverse Colon: normal   Ascending Colon: normal   Cecum: normal, including the appendiceal orifice and ileocecal valve. Terminal ileum: not evaluated     Specimens: none     Bowel preparation- Inadequate to detect small (5mm) polyps or larger. There was thick liquid stool scattered throughout the colon that could not be removed. Therefore, lesions and polyps cannot be excluded in areas where retained stool remained. The only thing I can say with fair certainty is there were no obstructing masses.   Otherwise there could be polyps or other pathology

## 2023-10-04 ENCOUNTER — TELEPHONE (OUTPATIENT)
Facility: CLINIC | Age: 58
End: 2023-10-04

## 2023-10-04 NOTE — TELEPHONE ENCOUNTER
Left voice message for pt to call back to set up appointment either in office or virtual to discuss medications per Mrs. Gianna Hernandez' request.

## 2023-10-04 NOTE — TELEPHONE ENCOUNTER
----- Message from Bernardo Hameed, [address] sent at 10/3/2023  6:46 PM EDT -----  This patient received medication refills August 11 from a primary care provider in [facility]. I am discussing her controlled substances meaning gabapentin and her Ambien. I will be more than happy to assist this patient with refills. I have never filled her controlled substances and this is important that I discussed this with this patient please call her I and willing to do a telephone call but I need to discuss with her her use of Ambien and gabapentin as they are controlled substances and she did receive a refill on these medicines from another primary care provider a month ago, but I saw her August and she received these refills in September?

## 2023-10-05 ENCOUNTER — TELEPHONE (OUTPATIENT)
Facility: CLINIC | Age: 58
End: 2023-10-05

## 2023-10-05 DIAGNOSIS — I10 PRIMARY HYPERTENSION: Primary | ICD-10-CM

## 2023-10-05 RX ORDER — AMLODIPINE BESYLATE 2.5 MG/1
2.5 TABLET ORAL DAILY
Qty: 30 TABLET | Refills: 0 | Status: SHIPPED | OUTPATIENT
Start: 2023-10-05 | End: 2023-10-17 | Stop reason: SDUPTHER

## 2023-10-05 NOTE — TELEPHONE ENCOUNTER
Patient called for a refill on her blood pressure medication, Amlodipine. Last appointment with PCP was 9/14/2023. Patient has a follow up for 3/2024 for her 6 month follow up. Advised patient to bring BP log by providers office for review by PCP and I would send 30 day supply in case medication change is needed.

## 2023-10-13 RX ORDER — ZOLPIDEM TARTRATE 10 MG/1
TABLET ORAL
Qty: 30 TABLET | Refills: 0 | OUTPATIENT
Start: 2023-10-13

## 2023-10-16 RX ORDER — ZOLPIDEM TARTRATE 10 MG/1
TABLET ORAL
Qty: 30 TABLET | Refills: 0 | OUTPATIENT
Start: 2023-10-16

## 2023-10-17 DIAGNOSIS — I10 PRIMARY HYPERTENSION: ICD-10-CM

## 2023-10-17 RX ORDER — ZOLPIDEM TARTRATE 10 MG/1
TABLET ORAL
Status: CANCELLED | OUTPATIENT
Start: 2023-10-17

## 2023-10-17 RX ORDER — AMLODIPINE BESYLATE 2.5 MG/1
2.5 TABLET ORAL DAILY
Qty: 90 TABLET | Refills: 0 | Status: SHIPPED | OUTPATIENT
Start: 2023-10-17 | End: 2024-01-15

## 2023-10-17 NOTE — TELEPHONE ENCOUNTER
Ayana Lopez called for their medication refill.     Last Office visit:  9/14/23  Last labs:  8/29/23  Follow up visit:  3/14/24  Patient stated she is ut and has been out for a few days now

## 2023-10-19 ENCOUNTER — TELEPHONE (OUTPATIENT)
Facility: CLINIC | Age: 58
End: 2023-10-19

## 2023-10-19 DIAGNOSIS — F51.01 PRIMARY INSOMNIA: Primary | ICD-10-CM

## 2023-10-19 RX ORDER — ZOLPIDEM TARTRATE 10 MG/1
10 TABLET ORAL NIGHTLY PRN
Qty: 30 TABLET | Refills: 0 | Status: SHIPPED | OUTPATIENT
Start: 2023-10-19 | End: 2023-11-18

## 2023-10-19 NOTE — TELEPHONE ENCOUNTER
Called pt and let her know that her RX for zolpidem was sent to [facility]. Also, informed her that NP wants to see her in 30 days, before she will refill new RX. Pt declined to set up at this time, but will call back to schedule.

## 2023-10-19 NOTE — TELEPHONE ENCOUNTER
Pt called as she stated that Dang Catherine did not call her back yesterday regarding her zolpidem. Pt requesting refill and asked if she had to come to the office to get the RX. I explained that it is a controlled substance and that NP was out yesterday and is with pts this morning. When reviewed, I can contact her. She stated that she will be leaving to [facility] soon and will call back then.

## 2023-10-19 NOTE — TELEPHONE ENCOUNTER
Pts  called regarding a concern with pts zolpidem. I explained to the pts  that the refill request went to pts previous provider for a refill. I also explained that NP was out of the office yesterday and that she would be going through her refills in between pts as time allows. I also told him that the RX is a controlled substance and NP has never prescribed to her before. He understood and had stated that he just wanted to get her medications straight. I advised that I would call back when completed.

## 2023-11-02 PROBLEM — Z12.11 COLON CANCER SCREENING: Status: RESOLVED | Noted: 2023-10-03 | Resolved: 2023-11-02

## 2023-11-28 ENCOUNTER — TELEPHONE (OUTPATIENT)
Facility: CLINIC | Age: 58
End: 2023-11-28

## 2023-11-28 NOTE — TELEPHONE ENCOUNTER
Received message from pt that she needed an appt as she is out of medications. Pt had appt 9/14 and is to return in 6 mths 1/31/24. Called pt to see what medications she needs refilled to put in a refill request to provider, received HARLAN AHN to call me back.

## 2023-11-29 ENCOUNTER — TELEPHONE (OUTPATIENT)
Facility: CLINIC | Age: 58
End: 2023-11-29

## 2023-11-30 NOTE — TELEPHONE ENCOUNTER
Last fill of [facility] was 10-19-23 and per encounter note, patient was notified that she would need an appt, per NP, in 30 days for further refills. Patient declined to set up appt at that time. Please have patient schedule appt for refill of this medication.

## 2023-12-07 ENCOUNTER — OFFICE VISIT (OUTPATIENT)
Facility: CLINIC | Age: 58
End: 2023-12-07
Payer: COMMERCIAL

## 2023-12-07 VITALS
OXYGEN SATURATION: 97 % | HEIGHT: 66 IN | HEART RATE: 71 BPM | WEIGHT: 201.4 LBS | TEMPERATURE: 97.8 F | BODY MASS INDEX: 32.37 KG/M2 | DIASTOLIC BLOOD PRESSURE: 77 MMHG | RESPIRATION RATE: 16 BRPM | SYSTOLIC BLOOD PRESSURE: 146 MMHG

## 2023-12-07 DIAGNOSIS — E11.69 HYPERLIPIDEMIA DUE TO TYPE 2 DIABETES MELLITUS (HCC): ICD-10-CM

## 2023-12-07 DIAGNOSIS — M17.0 PRIMARY OSTEOARTHRITIS OF BOTH KNEES: ICD-10-CM

## 2023-12-07 DIAGNOSIS — E78.5 HYPERLIPIDEMIA DUE TO TYPE 2 DIABETES MELLITUS (HCC): ICD-10-CM

## 2023-12-07 DIAGNOSIS — F51.01 PRIMARY INSOMNIA: ICD-10-CM

## 2023-12-07 DIAGNOSIS — I10 PRIMARY HYPERTENSION: Primary | ICD-10-CM

## 2023-12-07 DIAGNOSIS — F32.5 MAJOR DEPRESSIVE DISORDER WITH SINGLE EPISODE, IN FULL REMISSION (HCC): ICD-10-CM

## 2023-12-07 DIAGNOSIS — R23.2 HOT FLASHES: ICD-10-CM

## 2023-12-07 DIAGNOSIS — G62.9 POLYNEUROPATHY, UNSPECIFIED: ICD-10-CM

## 2023-12-07 PROCEDURE — 3017F COLORECTAL CA SCREEN DOC REV: CPT | Performed by: NURSE PRACTITIONER

## 2023-12-07 PROCEDURE — G8417 CALC BMI ABV UP PARAM F/U: HCPCS | Performed by: NURSE PRACTITIONER

## 2023-12-07 PROCEDURE — 3077F SYST BP >= 140 MM HG: CPT | Performed by: NURSE PRACTITIONER

## 2023-12-07 PROCEDURE — 3046F HEMOGLOBIN A1C LEVEL >9.0%: CPT | Performed by: NURSE PRACTITIONER

## 2023-12-07 PROCEDURE — 2022F DILAT RTA XM EVC RTNOPTHY: CPT | Performed by: NURSE PRACTITIONER

## 2023-12-07 PROCEDURE — 99213 OFFICE O/P EST LOW 20 MIN: CPT | Performed by: NURSE PRACTITIONER

## 2023-12-07 PROCEDURE — G8427 DOCREV CUR MEDS BY ELIG CLIN: HCPCS | Performed by: NURSE PRACTITIONER

## 2023-12-07 PROCEDURE — 3078F DIAST BP <80 MM HG: CPT | Performed by: NURSE PRACTITIONER

## 2023-12-07 PROCEDURE — G8484 FLU IMMUNIZE NO ADMIN: HCPCS | Performed by: NURSE PRACTITIONER

## 2023-12-07 PROCEDURE — 4004F PT TOBACCO SCREEN RCVD TLK: CPT | Performed by: NURSE PRACTITIONER

## 2023-12-07 RX ORDER — GABAPENTIN 100 MG/1
100 CAPSULE ORAL 2 TIMES DAILY
Qty: 180 CAPSULE | Refills: 2 | Status: SHIPPED | OUTPATIENT
Start: 2023-12-07 | End: 2024-09-02

## 2023-12-07 RX ORDER — AMLODIPINE BESYLATE 2.5 MG/1
2.5 TABLET ORAL DAILY
Qty: 90 TABLET | Refills: 2 | Status: SHIPPED | OUTPATIENT
Start: 2023-12-07 | End: 2024-09-02

## 2023-12-07 RX ORDER — MELOXICAM 15 MG/1
15 TABLET ORAL DAILY
Qty: 30 TABLET | Refills: 3 | Status: SHIPPED | OUTPATIENT
Start: 2023-12-07 | End: 2024-04-05

## 2023-12-07 RX ORDER — ZOLPIDEM TARTRATE 10 MG/1
10 TABLET ORAL NIGHTLY PRN
Qty: 30 TABLET | Refills: 2 | Status: SHIPPED | OUTPATIENT
Start: 2023-12-07 | End: 2024-03-06

## 2023-12-07 RX ORDER — SIMVASTATIN 40 MG
40 TABLET ORAL NIGHTLY
Qty: 90 TABLET | Refills: 3 | Status: SHIPPED | OUTPATIENT
Start: 2023-12-07

## 2023-12-07 RX ORDER — LOSARTAN POTASSIUM 50 MG/1
50 TABLET ORAL DAILY
Qty: 90 TABLET | Refills: 1 | Status: SHIPPED | OUTPATIENT
Start: 2023-12-07 | End: 2024-06-04

## 2023-12-07 RX ORDER — CARVEDILOL 6.25 MG/1
6.25 TABLET ORAL 2 TIMES DAILY
Qty: 180 TABLET | Refills: 1 | Status: SHIPPED | OUTPATIENT
Start: 2023-12-07 | End: 2024-06-04

## 2023-12-07 RX ORDER — VENLAFAXINE HYDROCHLORIDE 150 MG/1
150 CAPSULE, EXTENDED RELEASE ORAL DAILY
Qty: 90 CAPSULE | Refills: 3 | Status: SHIPPED | OUTPATIENT
Start: 2023-12-07

## 2023-12-07 RX ORDER — FUROSEMIDE 20 MG/1
20 TABLET ORAL DAILY PRN
Qty: 30 TABLET | Refills: 2 | Status: SHIPPED | OUTPATIENT
Start: 2023-12-07 | End: 2024-03-06

## 2023-12-07 NOTE — PROGRESS NOTES
1. \"Have you been to the ER, urgent care clinic since your last visit? Hospitalized since your last visit? \" No    2. \"Have you seen or consulted any other health care providers outside of the [address] [place] [address] since your last visit? \" No     3. For patients aged 43-73: Has the patient had a colonoscopy / FIT/ Cologuard? Yes - Care Gap present. Most recent result on file      If the patient is female:    4. For patients aged 43-66: Has the patient had a mammogram within the past 2 years? Yes - Care Gap present. Most recent result on file      5. For patients aged 21-65: Has the patient had a pap smear? Yes - Care Gap present.  Most recent result on file

## 2023-12-07 NOTE — PROGRESS NOTES
Tremayne Ruiz (: 1965) is a 62 y.o. female patient, here for evaluation of the following chief complaint(s):  Discuss Medications       ASSESSMENT/PLAN:  Below is the assessment and plan developed based on review of pertinent history, physical exam, labs, studies, and medications. Blood pressure mildly elevated patient states that she has noted her blood pressures are a little bit normal abnormal over the 1 4090 goal.  We will go up on losartan to 50 mg plus all other medications for blood pressure will be kept at same dose. Patient states she understands and agrees we will continue to monitor blood pressures  Medications refilled  Patient is aware of the adverse side effects of meloxicam including but not limited to cardiac and renal dysfunction damage      Patient sees Norberto Mora for her diabetes management  Patient states she is going to reschedule with pulmonology for sleep study and evaluation of a CPAP machine need                1. Primary hypertension  -     amLODIPine (NORVASC) 2.5 MG tablet; Take 1 tablet by mouth daily, Disp-90 tablet, R-2Normal  -     carvedilol (COREG) 6.25 MG tablet; Take 1 tablet by mouth 2 times daily, Disp-180 tablet, R-1Normal  -     furosemide (LASIX) 20 MG tablet; Take 1 tablet by mouth daily as needed (Edema), Disp-30 tablet, R-2Normal  -     losartan (COZAAR) 50 MG tablet; Take 1 tablet by mouth daily, Disp-90 tablet, R-1Normal  2. Hyperlipidemia due to type 2 diabetes mellitus (HCC)  -     simvastatin (ZOCOR) 40 MG tablet; Take 1 tablet by mouth nightly, Disp-90 tablet, R-3Normal  3. Primary osteoarthritis of both knees  -     meloxicam (MOBIC) 15 MG tablet; Take 1 tablet by mouth daily, Disp-30 tablet, R-3Normal  4. Primary insomnia  -     zolpidem (AMBIEN) 10 MG tablet; Take 1 tablet by mouth nightly as needed for Sleep for up to 90 days. Max Daily Amount: 10 mg, Disp-30 tablet, R-2Normal  5.  Polyneuropathy, unspecified  -     gabapentin (NEURONTIN) 100

## 2024-03-05 DIAGNOSIS — G62.9 POLYNEUROPATHY, UNSPECIFIED: ICD-10-CM

## 2024-03-05 DIAGNOSIS — F51.01 PRIMARY INSOMNIA: ICD-10-CM

## 2024-03-06 RX ORDER — GABAPENTIN 100 MG/1
100 CAPSULE ORAL 2 TIMES DAILY
Qty: 180 CAPSULE | Refills: 2 | Status: SHIPPED | OUTPATIENT
Start: 2024-03-06 | End: 2024-12-01

## 2024-03-06 RX ORDER — ZOLPIDEM TARTRATE 10 MG/1
10 TABLET ORAL NIGHTLY PRN
Qty: 30 TABLET | Refills: 2 | Status: SHIPPED | OUTPATIENT
Start: 2024-03-06 | End: 2024-06-04

## 2024-03-14 ENCOUNTER — OFFICE VISIT (OUTPATIENT)
Facility: CLINIC | Age: 59
End: 2024-03-14
Payer: COMMERCIAL

## 2024-03-14 DIAGNOSIS — I10 PRIMARY HYPERTENSION: ICD-10-CM

## 2024-03-14 DIAGNOSIS — Z85.3 HISTORY OF BREAST CANCER: ICD-10-CM

## 2024-03-14 DIAGNOSIS — F51.01 PRIMARY INSOMNIA: ICD-10-CM

## 2024-03-14 DIAGNOSIS — E11.9 TYPE 2 DIABETES MELLITUS WITHOUT COMPLICATION, WITHOUT LONG-TERM CURRENT USE OF INSULIN (HCC): ICD-10-CM

## 2024-03-14 DIAGNOSIS — E11.69 HYPERLIPIDEMIA DUE TO TYPE 2 DIABETES MELLITUS (HCC): ICD-10-CM

## 2024-03-14 DIAGNOSIS — R23.2 HOT FLASHES: ICD-10-CM

## 2024-03-14 DIAGNOSIS — E78.5 HYPERLIPIDEMIA DUE TO TYPE 2 DIABETES MELLITUS (HCC): ICD-10-CM

## 2024-03-14 DIAGNOSIS — R53.83 OTHER FATIGUE: ICD-10-CM

## 2024-03-14 DIAGNOSIS — F32.5 MAJOR DEPRESSIVE DISORDER WITH SINGLE EPISODE, IN FULL REMISSION (HCC): ICD-10-CM

## 2024-03-14 DIAGNOSIS — F51.01 PRIMARY INSOMNIA: Primary | ICD-10-CM

## 2024-03-14 DIAGNOSIS — M17.0 PRIMARY OSTEOARTHRITIS OF BOTH KNEES: ICD-10-CM

## 2024-03-14 DIAGNOSIS — E78.00 HIGH CHOLESTEROL: ICD-10-CM

## 2024-03-14 DIAGNOSIS — G62.9 POLYNEUROPATHY, UNSPECIFIED: ICD-10-CM

## 2024-03-14 PROBLEM — N95.1 HOT FLASHES DUE TO MENOPAUSE: Status: RESOLVED | Noted: 2023-08-29 | Resolved: 2024-03-14

## 2024-03-14 PROBLEM — R55 SYNCOPE AND COLLAPSE: Status: RESOLVED | Noted: 2023-05-24 | Resolved: 2024-03-14

## 2024-03-14 PROBLEM — R94.39 ABNORMAL STRESS TEST: Status: RESOLVED | Noted: 2023-05-24 | Resolved: 2024-03-14

## 2024-03-14 PROBLEM — R07.9 CHEST PAIN: Status: RESOLVED | Noted: 2023-05-24 | Resolved: 2024-03-14

## 2024-03-14 PROCEDURE — 4004F PT TOBACCO SCREEN RCVD TLK: CPT | Performed by: NURSE PRACTITIONER

## 2024-03-14 PROCEDURE — G8417 CALC BMI ABV UP PARAM F/U: HCPCS | Performed by: NURSE PRACTITIONER

## 2024-03-14 PROCEDURE — G8427 DOCREV CUR MEDS BY ELIG CLIN: HCPCS | Performed by: NURSE PRACTITIONER

## 2024-03-14 PROCEDURE — 3046F HEMOGLOBIN A1C LEVEL >9.0%: CPT | Performed by: NURSE PRACTITIONER

## 2024-03-14 PROCEDURE — G8484 FLU IMMUNIZE NO ADMIN: HCPCS | Performed by: NURSE PRACTITIONER

## 2024-03-14 PROCEDURE — 2022F DILAT RTA XM EVC RTNOPTHY: CPT | Performed by: NURSE PRACTITIONER

## 2024-03-14 PROCEDURE — 3017F COLORECTAL CA SCREEN DOC REV: CPT | Performed by: NURSE PRACTITIONER

## 2024-03-14 PROCEDURE — 99213 OFFICE O/P EST LOW 20 MIN: CPT | Performed by: NURSE PRACTITIONER

## 2024-03-14 RX ORDER — MELOXICAM 15 MG/1
15 TABLET ORAL DAILY
Qty: 30 TABLET | Refills: 3 | Status: SHIPPED | OUTPATIENT
Start: 2024-03-14 | End: 2024-07-12

## 2024-03-14 RX ORDER — SIMVASTATIN 40 MG
40 TABLET ORAL NIGHTLY
Qty: 90 TABLET | Refills: 3 | Status: SHIPPED | OUTPATIENT
Start: 2024-03-14

## 2024-03-14 RX ORDER — LOSARTAN POTASSIUM 50 MG/1
50 TABLET ORAL DAILY
Qty: 90 TABLET | Refills: 1 | Status: SHIPPED | OUTPATIENT
Start: 2024-03-14 | End: 2024-09-10

## 2024-03-14 RX ORDER — CARVEDILOL 6.25 MG/1
6.25 TABLET ORAL 2 TIMES DAILY
Qty: 180 TABLET | Refills: 1 | Status: SHIPPED | OUTPATIENT
Start: 2024-03-14 | End: 2024-09-10

## 2024-03-14 RX ORDER — FUROSEMIDE 20 MG/1
20 TABLET ORAL DAILY PRN
Qty: 30 TABLET | Refills: 2 | Status: SHIPPED | OUTPATIENT
Start: 2024-03-14 | End: 2024-06-12

## 2024-03-14 RX ORDER — VENLAFAXINE HYDROCHLORIDE 150 MG/1
150 CAPSULE, EXTENDED RELEASE ORAL DAILY
Qty: 90 CAPSULE | Refills: 3 | Status: SHIPPED | OUTPATIENT
Start: 2024-03-14

## 2024-03-14 RX ORDER — ZOLPIDEM TARTRATE 10 MG/1
10 TABLET ORAL NIGHTLY PRN
Qty: 30 TABLET | Refills: 2 | Status: SHIPPED | OUTPATIENT
Start: 2024-03-14 | End: 2024-06-12

## 2024-03-14 RX ORDER — AMLODIPINE BESYLATE 2.5 MG/1
2.5 TABLET ORAL DAILY
Qty: 90 TABLET | Refills: 2 | Status: SHIPPED | OUTPATIENT
Start: 2024-03-14 | End: 2024-12-09

## 2024-03-14 RX ORDER — GABAPENTIN 100 MG/1
100 CAPSULE ORAL 2 TIMES DAILY
Qty: 180 CAPSULE | Refills: 2 | Status: SHIPPED | OUTPATIENT
Start: 2024-03-14 | End: 2024-12-09

## 2024-03-14 ASSESSMENT — PATIENT HEALTH QUESTIONNAIRE - PHQ9
SUM OF ALL RESPONSES TO PHQ9 QUESTIONS 1 & 2: 2
SUM OF ALL RESPONSES TO PHQ QUESTIONS 1-9: 3
3. TROUBLE FALLING OR STAYING ASLEEP: 0
7. TROUBLE CONCENTRATING ON THINGS, SUCH AS READING THE NEWSPAPER OR WATCHING TELEVISION: 0
1. LITTLE INTEREST OR PLEASURE IN DOING THINGS: 2
10. IF YOU CHECKED OFF ANY PROBLEMS, HOW DIFFICULT HAVE THESE PROBLEMS MADE IT FOR YOU TO DO YOUR WORK, TAKE CARE OF THINGS AT HOME, OR GET ALONG WITH OTHER PEOPLE: 0
2. FEELING DOWN, DEPRESSED OR HOPELESS: 0
SUM OF ALL RESPONSES TO PHQ QUESTIONS 1-9: 3
SUM OF ALL RESPONSES TO PHQ QUESTIONS 1-9: 3
8. MOVING OR SPEAKING SO SLOWLY THAT OTHER PEOPLE COULD HAVE NOTICED. OR THE OPPOSITE, BEING SO FIGETY OR RESTLESS THAT YOU HAVE BEEN MOVING AROUND A LOT MORE THAN USUAL: 0
9. THOUGHTS THAT YOU WOULD BE BETTER OFF DEAD, OR OF HURTING YOURSELF: 0
SUM OF ALL RESPONSES TO PHQ QUESTIONS 1-9: 3
5. POOR APPETITE OR OVEREATING: 1
4. FEELING TIRED OR HAVING LITTLE ENERGY: 0
6. FEELING BAD ABOUT YOURSELF - OR THAT YOU ARE A FAILURE OR HAVE LET YOURSELF OR YOUR FAMILY DOWN: 0

## 2024-03-14 NOTE — PROGRESS NOTES
Chief Complaint   Patient presents with    Follow-up    Medication Refill     Lasix       \"Have you been to the ER, urgent care clinic since your last visit?  Hospitalized since your last visit?\"    NO    “Have you seen or consulted any other health care providers outside of [facility] since your last visit?”    NO            
and unlabored.  Lung sounds are clear and equal to auscultation throughout all lung fields without wheezing, rales, or rhonchi.  GI:  The abdomen is soft and nontender.  Bowel sounds are present in all 4 quadrants.  There is no hepatosplenomegaly or other masses.  There is no rebound or guarding.  There is no CVA or suprapubic tenderness..  Extremities:  There is no clubbing, cyanosis, or edema.  3+ peripheral pulses.cap refill less than 2 seconds   Neurological:    There is no obvious focal sensory or motor deficits.   Strength is 5/5 in the upper and lower extremity bilaterally.          This chart was prepared using voice recognition software and may contain unintended word substitution errors    An electronic signature was used to authenticate this note.  -- LAMAR Amaya

## 2024-04-04 LAB — HBA1C MFR BLD HPLC: 6.5 %

## 2024-06-19 ENCOUNTER — TELEPHONE (OUTPATIENT)
Facility: CLINIC | Age: 59
End: 2024-06-19

## 2024-06-19 NOTE — TELEPHONE ENCOUNTER
Pt called wanting to talk to kamilla states - Left breast hurting - 3 to 4 days - inch above the nipple outside area - tender by touch. Asking for a mammogram order inform pt will forward message to provider or nurse and give her a call. Please call pt at   #[phone]

## 2024-06-19 NOTE — TELEPHONE ENCOUNTER
Called patient regarding Breast pain. Advised she will need appointment for Breast exam. Transferred to PSR to schedule appointment.

## 2024-06-19 NOTE — TELEPHONE ENCOUNTER
Spoke to pt - pt was at work - refused some days due to her work schedule - try to call pt back to schedule but was told she will call back pt was with a pt at work.

## 2024-07-12 DIAGNOSIS — I10 PRIMARY HYPERTENSION: ICD-10-CM

## 2024-07-12 NOTE — TELEPHONE ENCOUNTER
Would like refill of Ambien 10 mg. It is not in her medication list, but it was prescribed at her last office visit on 3-14-24.

## 2024-07-12 NOTE — TELEPHONE ENCOUNTER
Pt states [facility] sent a request for medication Monday and have not recv the order yet. Pt is upset and doesn't know why she has to call to make sure. I did not see request or refill. Please call pt advise medication was sent. Pt needs it today- she stated she going on vacation. Please call pt once it done...

## 2024-07-16 DIAGNOSIS — F51.01 PRIMARY INSOMNIA: Primary | ICD-10-CM

## 2024-07-16 RX ORDER — AMLODIPINE BESYLATE 2.5 MG/1
2.5 TABLET ORAL DAILY
Qty: 90 TABLET | Refills: 2 | Status: SHIPPED | OUTPATIENT
Start: 2024-07-16 | End: 2025-04-12

## 2024-07-16 RX ORDER — ZOLPIDEM TARTRATE 10 MG/1
10 TABLET ORAL NIGHTLY PRN
Qty: 30 TABLET | Refills: 1 | Status: SHIPPED | OUTPATIENT
Start: 2024-07-16 | End: 2024-09-14

## 2024-08-02 ENCOUNTER — COMMUNITY OUTREACH (OUTPATIENT)
Facility: CLINIC | Age: 59
End: 2024-08-02

## 2024-09-15 DIAGNOSIS — M17.0 PRIMARY OSTEOARTHRITIS OF BOTH KNEES: ICD-10-CM

## 2024-09-18 RX ORDER — MELOXICAM 15 MG/1
15 TABLET ORAL DAILY
Qty: 30 TABLET | Refills: 0 | Status: SHIPPED | OUTPATIENT
Start: 2024-09-18

## 2024-09-24 ENCOUNTER — OFFICE VISIT (OUTPATIENT)
Facility: CLINIC | Age: 59
End: 2024-09-24
Payer: COMMERCIAL

## 2024-09-24 DIAGNOSIS — I10 PRIMARY HYPERTENSION: ICD-10-CM

## 2024-09-24 DIAGNOSIS — G62.9 POLYNEUROPATHY, UNSPECIFIED: ICD-10-CM

## 2024-09-24 DIAGNOSIS — M17.0 PRIMARY OSTEOARTHRITIS OF BOTH KNEES: ICD-10-CM

## 2024-09-24 DIAGNOSIS — R45.1 AGITATION: ICD-10-CM

## 2024-09-24 DIAGNOSIS — E11.69 HYPERLIPIDEMIA DUE TO TYPE 2 DIABETES MELLITUS (HCC): ICD-10-CM

## 2024-09-24 DIAGNOSIS — E78.5 HYPERLIPIDEMIA DUE TO TYPE 2 DIABETES MELLITUS (HCC): ICD-10-CM

## 2024-09-24 DIAGNOSIS — F51.01 PRIMARY INSOMNIA: Primary | ICD-10-CM

## 2024-09-24 DIAGNOSIS — F33.0 MILD EPISODE OF RECURRENT MAJOR DEPRESSIVE DISORDER (HCC): ICD-10-CM

## 2024-09-24 DIAGNOSIS — E11.9 TYPE 2 DIABETES MELLITUS WITHOUT COMPLICATION, WITHOUT LONG-TERM CURRENT USE OF INSULIN (HCC): ICD-10-CM

## 2024-09-24 PROCEDURE — 2022F DILAT RTA XM EVC RTNOPTHY: CPT | Performed by: NURSE PRACTITIONER

## 2024-09-24 PROCEDURE — 3046F HEMOGLOBIN A1C LEVEL >9.0%: CPT | Performed by: NURSE PRACTITIONER

## 2024-09-24 PROCEDURE — 4004F PT TOBACCO SCREEN RCVD TLK: CPT | Performed by: NURSE PRACTITIONER

## 2024-09-24 PROCEDURE — G8427 DOCREV CUR MEDS BY ELIG CLIN: HCPCS | Performed by: NURSE PRACTITIONER

## 2024-09-24 PROCEDURE — 3077F SYST BP >= 140 MM HG: CPT | Performed by: NURSE PRACTITIONER

## 2024-09-24 PROCEDURE — G8417 CALC BMI ABV UP PARAM F/U: HCPCS | Performed by: NURSE PRACTITIONER

## 2024-09-24 PROCEDURE — 99214 OFFICE O/P EST MOD 30 MIN: CPT | Performed by: NURSE PRACTITIONER

## 2024-09-24 PROCEDURE — 3017F COLORECTAL CA SCREEN DOC REV: CPT | Performed by: NURSE PRACTITIONER

## 2024-09-24 PROCEDURE — 3079F DIAST BP 80-89 MM HG: CPT | Performed by: NURSE PRACTITIONER

## 2024-09-24 RX ORDER — AMLODIPINE BESYLATE 2.5 MG/1
2.5 TABLET ORAL DAILY
Qty: 90 TABLET | Refills: 1 | Status: SHIPPED | OUTPATIENT
Start: 2024-09-24 | End: 2025-03-23

## 2024-09-24 RX ORDER — VENLAFAXINE HYDROCHLORIDE 75 MG/1
75 CAPSULE, EXTENDED RELEASE ORAL DAILY
Qty: 90 CAPSULE | Refills: 0 | Status: SHIPPED | OUTPATIENT
Start: 2024-09-24 | End: 2024-12-23

## 2024-09-24 RX ORDER — CARVEDILOL 6.25 MG/1
6.25 TABLET ORAL 2 TIMES DAILY
Qty: 180 TABLET | Refills: 1 | Status: SHIPPED | OUTPATIENT
Start: 2024-09-24 | End: 2025-03-23

## 2024-09-24 RX ORDER — TIRZEPATIDE 12.5 MG/.5ML
12.5 INJECTION, SOLUTION SUBCUTANEOUS WEEKLY
COMMUNITY
Start: 2024-09-13

## 2024-09-24 RX ORDER — LOSARTAN POTASSIUM 50 MG/1
50 TABLET ORAL DAILY
Qty: 90 TABLET | Refills: 1 | Status: SHIPPED | OUTPATIENT
Start: 2024-09-24 | End: 2025-03-23

## 2024-09-24 RX ORDER — SIMVASTATIN 40 MG
40 TABLET ORAL NIGHTLY
Qty: 90 TABLET | Refills: 1 | Status: SHIPPED | OUTPATIENT
Start: 2024-09-24

## 2024-09-24 RX ORDER — ZOLPIDEM TARTRATE 10 MG/1
5 TABLET ORAL NIGHTLY PRN
COMMUNITY
Start: 2024-09-11 | End: 2024-09-24 | Stop reason: SDUPTHER

## 2024-09-24 RX ORDER — ZOLPIDEM TARTRATE 10 MG/1
5 TABLET ORAL NIGHTLY PRN
Qty: 90 TABLET | Refills: 0 | Status: SHIPPED | OUTPATIENT
Start: 2024-09-24 | End: 2025-03-23

## 2024-09-24 RX ORDER — VENLAFAXINE HYDROCHLORIDE 150 MG/1
150 CAPSULE, EXTENDED RELEASE ORAL DAILY
Qty: 90 CAPSULE | Refills: 1 | Status: SHIPPED | OUTPATIENT
Start: 2024-09-24

## 2024-09-24 RX ORDER — MELOXICAM 15 MG/1
15 TABLET ORAL DAILY
Qty: 30 TABLET | Refills: 0 | Status: SHIPPED | OUTPATIENT
Start: 2024-09-24 | End: 2024-10-24

## 2024-09-24 RX ORDER — GABAPENTIN 100 MG/1
100 CAPSULE ORAL 2 TIMES DAILY
Qty: 180 CAPSULE | Refills: 2 | Status: SHIPPED | OUTPATIENT
Start: 2024-09-24 | End: 2025-06-21

## 2024-09-25 VITALS
DIASTOLIC BLOOD PRESSURE: 80 MMHG | HEART RATE: 70 BPM | HEIGHT: 66 IN | BODY MASS INDEX: 32.95 KG/M2 | TEMPERATURE: 97.7 F | RESPIRATION RATE: 18 BRPM | SYSTOLIC BLOOD PRESSURE: 176 MMHG | WEIGHT: 205 LBS | OXYGEN SATURATION: 94 %

## 2024-11-12 ENCOUNTER — TELEPHONE (OUTPATIENT)
Facility: CLINIC | Age: 59
End: 2024-11-12

## 2024-11-12 DIAGNOSIS — F51.01 PRIMARY INSOMNIA: ICD-10-CM

## 2024-11-12 RX ORDER — ZOLPIDEM TARTRATE 10 MG/1
10 TABLET ORAL NIGHTLY PRN
Qty: 30 TABLET | Refills: 2 | Status: SHIPPED | OUTPATIENT
Start: 2024-11-12 | End: 2025-02-10

## 2024-11-12 NOTE — TELEPHONE ENCOUNTER
Patient called office, states provider \"did not order my Ambien right\". States she takes a whole tablet the night before she has to work and a half tablet when she does not. She is working more hours, so is taking a whole tablet more often and will run out. Requests refill to [facility].

## 2025-01-16 DIAGNOSIS — I10 PRIMARY HYPERTENSION: ICD-10-CM

## 2025-01-16 NOTE — TELEPHONE ENCOUNTER
Left voice message for pt that she needs to have the lab work done before the medication can be refilled.

## 2025-01-16 NOTE — TELEPHONE ENCOUNTER
Please call and get patient schedule for a lab draw so that we can refill her medication.  ASAP please.

## 2025-01-28 ENCOUNTER — HOSPITAL ENCOUNTER (OUTPATIENT)
Age: 60
Setting detail: SPECIMEN
Discharge: HOME | End: 2025-01-31
Payer: COMMERCIAL

## 2025-01-28 ENCOUNTER — TRANSCRIBE ORDERS (OUTPATIENT)
Age: 60
End: 2025-01-28

## 2025-01-28 DIAGNOSIS — E11.65 INADEQUATELY CONTROLLED DIABETES MELLITUS (HCC): Primary | ICD-10-CM

## 2025-01-28 DIAGNOSIS — E11.65 INADEQUATELY CONTROLLED DIABETES MELLITUS (HCC): ICD-10-CM

## 2025-01-28 LAB
25(OH)D3 SERPL-MCNC: 9.6 NG/ML (ref 30–100)
ALBUMIN SERPL-MCNC: 3.6 G/DL (ref 3.4–5)
ALBUMIN/GLOB SERPL: 1.1 (ref 0.8–1.7)
ALP SERPL-CCNC: 85 U/L (ref 45–117)
ALT SERPL-CCNC: 34 U/L (ref 13–56)
ANION GAP SERPL CALC-SCNC: 4 MMOL/L (ref 3–18)
AST SERPL W P-5'-P-CCNC: 17 U/L (ref 10–38)
BASOPHILS # BLD: 0.04 K/UL (ref 0–0.1)
BASOPHILS NFR BLD: 0.4 % (ref 0–2)
BILIRUB SERPL-MCNC: 0.5 MG/DL (ref 0.2–1)
BUN SERPL-MCNC: 7 MG/DL (ref 7–18)
BUN/CREAT SERPL: 10 (ref 12–20)
CA-I BLD-MCNC: 9.5 MG/DL (ref 8.5–10.1)
CHLORIDE SERPL-SCNC: 105 MMOL/L (ref 100–111)
CHOLEST SERPL-MCNC: 157 MG/DL
CO2 SERPL-SCNC: 32 MMOL/L (ref 21–32)
CREAT SERPL-MCNC: 0.67 MG/DL (ref 0.6–1.3)
CREAT UR-MCNC: 243 MG/DL (ref 30–125)
DIFFERENTIAL METHOD BLD: NORMAL
EOSINOPHIL # BLD: 0.22 K/UL (ref 0–0.4)
EOSINOPHIL NFR BLD: 2.4 % (ref 0–5)
ERYTHROCYTE [DISTWIDTH] IN BLOOD BY AUTOMATED COUNT: 13.8 % (ref 11.6–14.5)
FOLATE SERPL-MCNC: 6.4 NG/ML (ref 3.1–17.5)
GLOBULIN SER CALC-MCNC: 3.4 G/DL (ref 2–4)
GLUCOSE SERPL-MCNC: 92 MG/DL (ref 74–99)
HCT VFR BLD AUTO: 44 % (ref 35–45)
HDLC SERPL-MCNC: 45 MG/DL (ref 40–60)
HDLC SERPL: 3.5 (ref 0–5)
HGB BLD-MCNC: 14.4 G/DL (ref 12–16)
IMM GRANULOCYTES # BLD AUTO: 0.03 K/UL (ref 0–0.04)
IMM GRANULOCYTES NFR BLD AUTO: 0.3 % (ref 0–0.5)
IRON SATN MFR SERPL: 15 % (ref 20–50)
IRON SERPL-MCNC: 59 UG/DL (ref 50–175)
LDLC SERPL CALC-MCNC: 60.8 MG/DL (ref 0–100)
LIPID PANEL: ABNORMAL
LYMPHOCYTES # BLD: 3.28 K/UL (ref 0.9–3.6)
LYMPHOCYTES NFR BLD: 36.4 % (ref 21–52)
MAGNESIUM SERPL-MCNC: 1.7 MG/DL (ref 1.6–2.6)
MCH RBC QN AUTO: 30.8 PG (ref 24–34)
MCHC RBC AUTO-ENTMCNC: 32.7 G/DL (ref 31–37)
MCV RBC AUTO: 94.2 FL (ref 78–100)
MICROALBUMIN UR-MCNC: 15.8 MG/DL (ref 0–3)
MICROALBUMIN/CREAT UR-RTO: 65 MGMALB/GCRE (ref 0–30)
MONOCYTES # BLD: 0.74 K/UL (ref 0.05–1.2)
MONOCYTES NFR BLD: 8.2 % (ref 3–10)
NEUTS SEG # BLD: 4.71 K/UL (ref 1.8–8)
NEUTS SEG NFR BLD: 52.3 % (ref 40–73)
NRBC # BLD: 0 K/UL (ref 0–0.01)
NRBC BLD-RTO: 0 PER 100 WBC
PLATELET # BLD AUTO: 253 K/UL (ref 135–420)
PMV BLD AUTO: 10.3 FL (ref 9.2–11.8)
POTASSIUM SERPL-SCNC: 4 MMOL/L (ref 3.5–5.5)
PROT SERPL-MCNC: 7 G/DL (ref 6.4–8.2)
RBC # BLD AUTO: 4.67 M/UL (ref 4.2–5.3)
SODIUM SERPL-SCNC: 141 MMOL/L (ref 136–145)
TIBC SERPL-MCNC: 389 UG/DL (ref 250–450)
TRIGL SERPL-MCNC: 256 MG/DL
TSH SERPL DL<=0.05 MIU/L-ACNC: 2.4 UIU/ML (ref 0.36–3.74)
VIT B12 SERPL-MCNC: 299 PG/ML (ref 211–911)
VLDLC SERPL CALC-MCNC: 51.2 MG/DL
WBC # BLD AUTO: 9 K/UL (ref 4.6–13.2)

## 2025-01-28 PROCEDURE — 82607 VITAMIN B-12: CPT

## 2025-01-28 PROCEDURE — 82570 ASSAY OF URINE CREATININE: CPT

## 2025-01-28 PROCEDURE — 83735 ASSAY OF MAGNESIUM: CPT

## 2025-01-28 PROCEDURE — 80061 LIPID PANEL: CPT

## 2025-01-28 PROCEDURE — 82306 VITAMIN D 25 HYDROXY: CPT

## 2025-01-28 PROCEDURE — 80053 COMPREHEN METABOLIC PANEL: CPT

## 2025-01-28 PROCEDURE — 36415 COLL VENOUS BLD VENIPUNCTURE: CPT

## 2025-01-28 PROCEDURE — 84443 ASSAY THYROID STIM HORMONE: CPT

## 2025-01-28 PROCEDURE — 82043 UR ALBUMIN QUANTITATIVE: CPT

## 2025-01-28 PROCEDURE — 82746 ASSAY OF FOLIC ACID SERUM: CPT

## 2025-01-28 PROCEDURE — 83540 ASSAY OF IRON: CPT

## 2025-01-28 PROCEDURE — 85025 COMPLETE CBC W/AUTO DIFF WBC: CPT

## 2025-02-13 ENCOUNTER — TELEPHONE (OUTPATIENT)
Age: 60
End: 2025-02-13

## 2025-02-13 RX ORDER — CLINDAMYCIN HYDROCHLORIDE 300 MG/1
300 CAPSULE ORAL EVERY 6 HOURS
Qty: 28 CAPSULE | Refills: 0 | Status: SHIPPED | OUTPATIENT
Start: 2025-02-13 | End: 2025-02-20

## 2025-02-13 NOTE — TELEPHONE ENCOUNTER
RTKR 10/14/2021    PATIENT REQUESTING ABX FOR POSSIBLE ORAL INFECTION TO HOLD HER OVER UNTIL ENT APPOINTMENT SCHEDULED FOR NEXT MONTH.    PHARMACY ON FILE:  [facility] [address] [place] [address] -  [phone] - F [phone] [[id number]]

## 2025-02-14 DIAGNOSIS — I10 PRIMARY HYPERTENSION: ICD-10-CM

## 2025-02-14 RX ORDER — AMLODIPINE BESYLATE 2.5 MG/1
2.5 TABLET ORAL DAILY
Qty: 90 TABLET | Refills: 1 | Status: SHIPPED | OUTPATIENT
Start: 2025-02-14 | End: 2025-08-13

## 2025-02-18 RX ORDER — FUROSEMIDE 20 MG/1
TABLET ORAL
Qty: 30 TABLET | Refills: 0 | Status: SHIPPED | OUTPATIENT
Start: 2025-02-18

## 2025-02-20 DIAGNOSIS — F51.01 PRIMARY INSOMNIA: ICD-10-CM

## 2025-02-21 RX ORDER — ZOLPIDEM TARTRATE 10 MG/1
TABLET ORAL
Qty: 30 TABLET | Refills: 2 | Status: SHIPPED | OUTPATIENT
Start: 2025-02-21 | End: 2025-03-23

## 2025-02-23 DIAGNOSIS — I10 PRIMARY HYPERTENSION: ICD-10-CM

## 2025-02-25 RX ORDER — FUROSEMIDE 20 MG/1
TABLET ORAL
Qty: 30 TABLET | Refills: 0 | OUTPATIENT
Start: 2025-02-25

## 2025-02-28 DIAGNOSIS — M17.0 PRIMARY OSTEOARTHRITIS OF BOTH KNEES: ICD-10-CM

## 2025-02-28 RX ORDER — MELOXICAM 15 MG/1
15 TABLET ORAL DAILY
Qty: 30 TABLET | Refills: 0 | Status: SHIPPED | OUTPATIENT
Start: 2025-02-28 | End: 2025-03-30

## 2025-02-28 RX ORDER — VENLAFAXINE HYDROCHLORIDE 75 MG/1
75 CAPSULE, EXTENDED RELEASE ORAL DAILY
Qty: 90 CAPSULE | Refills: 0 | Status: SHIPPED | OUTPATIENT
Start: 2025-02-28 | End: 2025-05-29

## 2025-02-28 NOTE — TELEPHONE ENCOUNTER
Patient states she is taking Effexor 150 mg and 75 mg together to equal 225 mg dose and doing well. She would also like refill on Meloxicam.

## 2025-02-28 NOTE — TELEPHONE ENCOUNTER
At appointment on 9-24-24, she wanted to increase Effexor to 225 mg, so 150 mg and 75 mg combined. She was to return in 2 weeks to discuss effect, but did not follow up.

## 2025-03-17 DIAGNOSIS — I10 PRIMARY HYPERTENSION: ICD-10-CM

## 2025-03-18 RX ORDER — AMLODIPINE BESYLATE 2.5 MG/1
2.5 TABLET ORAL DAILY
Qty: 90 TABLET | Refills: 1 | Status: SHIPPED | OUTPATIENT
Start: 2025-03-18 | End: 2025-09-14

## 2025-03-18 RX ORDER — LOSARTAN POTASSIUM 50 MG/1
50 TABLET ORAL DAILY
Qty: 90 TABLET | Refills: 1 | Status: SHIPPED | OUTPATIENT
Start: 2025-03-18 | End: 2025-09-14

## (undated) DEVICE — Z DISCONTINUED BY MEDLINE USE 2711682 TRAY SKIN PREP DRY W/ PREM GLV

## (undated) DEVICE — SOLUTION IV 1000ML 0.9% SOD CHL

## (undated) DEVICE — Z DUP USE 2514810 PACK FIX INCL 4 UNIV KNEE 2 THRD HD PIN ATTUNE

## (undated) DEVICE — PACK SURG BSHR TOT KNEE LF

## (undated) DEVICE — HYPODERMIC SAFETY NEEDLE: Brand: MAGELLAN

## (undated) DEVICE — INTENDED FOR TISSUE SEPARATION, AND OTHER PROCEDURES THAT REQUIRE A SHARP SURGICAL BLADE TO PUNCTURE OR CUT.: Brand: BARD-PARKER ® CARBON RIB-BACK BLADES

## (undated) DEVICE — (D)PREP SKN CHLRAPRP APPL 26ML -- CONVERT TO ITEM 371833

## (undated) DEVICE — SUTURE FIBERWIRE SZ 0 L38IN NONABSORBABLE BLU L22.2MM 1/2 AR7250

## (undated) DEVICE — SOLUTION IRRIG 500ML STRL H2O NONPYROGENIC

## (undated) DEVICE — UNDERGLOVE SURG SZ 7.5 BLU LTX FREE SYN POLYISOPRENE

## (undated) DEVICE — ENDOGATOR TUBING FOR BOSTON SCIENTIFIC ENDOSTAT II PUMP, OLYMPUS OFP PUMP OR ENDO STRATUS PUMP: Brand: ENDOGATOR

## (undated) DEVICE — BLANKET WRM AD W50XL85.8IN PACU FULL BODY FORC AIR

## (undated) DEVICE — BLADE SAW W9XL31MM THK038MM CUT THK043MM REPL SAG OSC THN

## (undated) DEVICE — SUTURE VCRL SZ 0 L27IN ABSRB UD L36MM CT-1 1/2 CIR J260H

## (undated) DEVICE — SUTURE MCRYL SZ 3-0 L27IN ABSRB UD L24MM PS-1 3/8 CIR PRIM Y936H

## (undated) DEVICE — SHEET,DRAPE,70X100,STERILE: Brand: MEDLINE

## (undated) DEVICE — TOTAL KNEE PACK: Brand: MEDLINE INDUSTRIES, INC.

## (undated) DEVICE — COVER,TABLE,HEAVY DUTY,60"X90",STRL: Brand: MEDLINE

## (undated) DEVICE — SUTURE VCRL SZ 1 L27IN ABSRB UD CT-1 L36MM 1/2 CIR J261H

## (undated) DEVICE — GARMENT COMPR M FOR 13IN FT INTMIT SGL BLDR HEM FORC II

## (undated) DEVICE — (D)LUB GEL SURG SURGLUB 2OZ -- DISC BY MFR USE ITEM 292507

## (undated) DEVICE — BLADE ELECTRODE: Brand: VALLEYLAB

## (undated) DEVICE — SOLUTION IRRIG 1000ML STRL H2O USP PLAS POUR BTL

## (undated) DEVICE — GLOVE SURG SZ 65 THK91MIL LTX FREE SYN POLYISOPRENE

## (undated) DEVICE — FLEX ADVANTAGE 3000CC: Brand: FLEX ADVANTAGE

## (undated) DEVICE — STRYKER PERFORMANCE SERIES SAGITTAL BLADE: Brand: STRYKER PERFORMANCE SERIES

## (undated) DEVICE — SKIN CLOS DERMABND PRINEO 60CM -- DERMABOUND PRINEO

## (undated) DEVICE — SUTURE VCRL SZ 2-0 L27IN ABSRB UD L26MM SH 1/2 CIR J417H

## (undated) DEVICE — GAUZE,SPONGE,4"X4",16PLY,STRL,LF,10/TRAY: Brand: MEDLINE

## (undated) DEVICE — DISPOSABLE TOURNIQUET CUFF SINGLE BLADDER, DUAL PORT AND QUICK CONNECT CONNECTOR: Brand: COLOR CUFF

## (undated) DEVICE — HEWSON SUTURE RETRIEVER: Brand: HEWSON SUTURE RETRIEVER

## (undated) DEVICE — PADDING CAST W4INXL4YD ST COT COHESIVE HND TEARABLE SPEC

## (undated) DEVICE — TUBING INSUFFLATION CAP W/ EXT CARBON DIOX ENDO SMARTCAP

## (undated) DEVICE — TAPE CST LT 4INX4YD FBRGLS WHT --

## (undated) DEVICE — DRESSING,GAUZE,XEROFORM,CURAD,1"X8",ST: Brand: CURAD

## (undated) DEVICE — STERILE POLYISOPRENE POWDER-FREE SURGICAL GLOVES: Brand: PROTEXIS

## (undated) DEVICE — KIT COLON W/ 1.1OZ LUB AND 2 END

## (undated) DEVICE — INTENDED FOR TISSUE SEPARATION, AND OTHER PROCEDURES THAT REQUIRE A SHARP SURGICAL BLADE TO PUNCTURE OR CUT.: Brand: BARD-PARKER SAFETY BLADES SIZE 10, STERILE

## (undated) DEVICE — Device: Brand: DEFENDO VALVE AND CONNECTOR KIT

## (undated) DEVICE — BOWL BNE CEM MIX SPAT CURET SMARTMIX CTS

## (undated) DEVICE — HOOD, PEEL-AWAY: Brand: FLYTE

## (undated) DEVICE — SOFT SILICONE HYDROCELLULAR SACRUM DRESSING WITH LOCK AWAY LAYER: Brand: ALLEVYN LIFE SACRUM (LARGE) PACK OF 10

## (undated) DEVICE — COVER,TABLE,HEAVY DUTY,77"X90",STRL: Brand: MEDLINE

## (undated) DEVICE — STERILE POLYISOPRENE POWDER-FREE SURGICAL GLOVES WITH EMOLLIENT COATING: Brand: PROTEXIS

## (undated) DEVICE — CUFF REPROC TRNQT DPSB W/PLC BROWN 34IN

## (undated) DEVICE — GLOVE SURG SZ 55 THK91MIL ORANGE  LTX FREE SYN POLYISOPRENE

## (undated) DEVICE — SOL IRR NACL 0.9% 500ML POUR --

## (undated) DEVICE — RECIPROCATING BLADE HEAVY DUTY LONG, OFFSET  (77.6 X 0.77 X 11.2MM)

## (undated) DEVICE — BLANKET WRM W29.9XL79.1IN UP BODY FORC AIR MISTRAL-AIR

## (undated) DEVICE — BANDAGE COMPR EXSANGUATION SGL LAYERED NO CLSR 9FT LEN 4IN W

## (undated) DEVICE — REM POLYHESIVE ADULT PATIENT RETURN ELECTRODE: Brand: VALLEYLAB

## (undated) DEVICE — BNDG ELAS HK LOOP 4X5YD NS -- MATRIX

## (undated) DEVICE — WRAP KNEE UNIV E STRP HK AND LOOP W/ GEL PK MEDCOOL

## (undated) DEVICE — SPONGE LAP SOFT 18X18 IN X RAY DETECTABLE

## (undated) DEVICE — BNDG,ELSTC,MATRIX,STRL,6"X5YD,LF,HOOK&LP: Brand: MEDLINE

## (undated) DEVICE — GOWN,AURORA,FABRIC-REINFORCED,X-LARGE: Brand: MEDLINE

## (undated) DEVICE — GARMENT,MEDLINE,DVT,INT,CALF,MED, GEN2: Brand: MEDLINE

## (undated) DEVICE — DRESSING ANTIMIC FOAM OPTIFOAM POSTOP ADH 4X14 IN

## (undated) DEVICE — GLOVE SURG SZ 65 L12IN FNGR THK79MIL GRN LTX FREE

## (undated) DEVICE — SUT ETHLN 3-0 18IN PC5 BLK --

## (undated) DEVICE — LOOP VES MAXI RED

## (undated) DEVICE — PAD,ABDOMINAL,8"X10",ST,LF: Brand: MEDLINE

## (undated) DEVICE — SUTURE VCRL SZ 3-0 L27IN ABSRB UD L26MM SH 1/2 CIR J416H

## (undated) DEVICE — GLOVE SURG 7 BIOGEL PI ULTRATOUCH G

## (undated) DEVICE — BANDAGE COBAN 4 IN COMPR W4INXL5YD FOAM COHESIVE QUIK STK SELF ADH SFT

## (undated) DEVICE — (D)HANDPIECE IRR W/HI FLO TIP -- DUPLICATE USE ITEM 121586

## (undated) DEVICE — TUBING, SUCTION, 9/32" X 10', STRAIGHT: Brand: MEDLINE

## (undated) DEVICE — 4-PORT MANIFOLD: Brand: NEPTUNE 2

## (undated) DEVICE — THE STERILE LIGHT HANDLE COVER IS USED WITH STERIS SURGICAL LIGHTING AND VISUALIZATION SYSTEMS.

## (undated) DEVICE — 3M™ IOBAN™ 2 ANTIMICROBIAL INCISE DRAPE 6650EZ: Brand: IOBAN™ 2

## (undated) DEVICE — DRAPE,TOP,102X53,STERILE: Brand: MEDLINE

## (undated) DEVICE — SUTURE VCRL SZ 2-0 L27IN ABSRB UD L26MM CT-2 1/2 CIR J269H

## (undated) DEVICE — PADDING CAST SOF-ROL 4INX4YD -- NS